# Patient Record
Sex: MALE | Race: BLACK OR AFRICAN AMERICAN | Employment: OTHER | URBAN - METROPOLITAN AREA
[De-identification: names, ages, dates, MRNs, and addresses within clinical notes are randomized per-mention and may not be internally consistent; named-entity substitution may affect disease eponyms.]

---

## 2018-06-06 ENCOUNTER — HOSPITAL ENCOUNTER (OUTPATIENT)
Dept: PREADMISSION TESTING | Age: 48
Discharge: HOME | End: 2018-06-10
Payer: COMMERCIAL

## 2018-06-06 VITALS
TEMPERATURE: 98.2 F | BODY MASS INDEX: 31.28 KG/M2 | WEIGHT: 236 LBS | OXYGEN SATURATION: 100 % | DIASTOLIC BLOOD PRESSURE: 84 MMHG | HEIGHT: 73 IN | RESPIRATION RATE: 24 BRPM | SYSTOLIC BLOOD PRESSURE: 129 MMHG | HEART RATE: 94 BPM

## 2018-06-06 LAB
ABSOLUTE EOS #: 0.18 K/UL (ref 0–0.44)
ABSOLUTE IMMATURE GRANULOCYTE: <0.03 K/UL (ref 0–0.3)
ABSOLUTE LYMPH #: 2.3 K/UL (ref 1.1–3.7)
ABSOLUTE MONO #: 0.34 K/UL (ref 0.1–1.2)
ANION GAP SERPL CALCULATED.3IONS-SCNC: 12 MMOL/L (ref 9–17)
BASOPHILS # BLD: 1 % (ref 0–2)
BASOPHILS ABSOLUTE: 0.05 K/UL (ref 0–0.2)
BUN BLDV-MCNC: 11 MG/DL (ref 6–20)
CHLORIDE BLD-SCNC: 101 MMOL/L (ref 98–107)
CO2: 29 MMOL/L (ref 20–31)
CREAT SERPL-MCNC: 0.61 MG/DL (ref 0.7–1.2)
DIFFERENTIAL TYPE: ABNORMAL
EKG ATRIAL RATE: 83 BPM
EKG P AXIS: 42 DEGREES
EKG P-R INTERVAL: 152 MS
EKG Q-T INTERVAL: 380 MS
EKG QRS DURATION: 86 MS
EKG QTC CALCULATION (BAZETT): 446 MS
EKG R AXIS: 4 DEGREES
EKG T AXIS: 5 DEGREES
EKG VENTRICULAR RATE: 83 BPM
EOSINOPHILS RELATIVE PERCENT: 4 % (ref 1–4)
GFR AFRICAN AMERICAN: >60 ML/MIN
GFR NON-AFRICAN AMERICAN: >60 ML/MIN
GFR SERPL CREATININE-BSD FRML MDRD: ABNORMAL ML/MIN/{1.73_M2}
GFR SERPL CREATININE-BSD FRML MDRD: ABNORMAL ML/MIN/{1.73_M2}
GLUCOSE BLD-MCNC: 112 MG/DL (ref 70–99)
HCT VFR BLD CALC: 43.2 % (ref 40.7–50.3)
HEMOGLOBIN: 14 G/DL (ref 13–17)
IMMATURE GRANULOCYTES: 0 %
LYMPHOCYTES # BLD: 46 % (ref 24–43)
MCH RBC QN AUTO: 29.3 PG (ref 25.2–33.5)
MCHC RBC AUTO-ENTMCNC: 32.4 G/DL (ref 28.4–34.8)
MCV RBC AUTO: 90.4 FL (ref 82.6–102.9)
MONOCYTES # BLD: 7 % (ref 3–12)
NRBC AUTOMATED: 0 PER 100 WBC
PDW BLD-RTO: 12.1 % (ref 11.8–14.4)
PLATELET # BLD: 228 K/UL (ref 138–453)
PLATELET ESTIMATE: ABNORMAL
PMV BLD AUTO: 10.2 FL (ref 8.1–13.5)
POTASSIUM SERPL-SCNC: 4.7 MMOL/L (ref 3.7–5.3)
RBC # BLD: 4.78 M/UL (ref 4.21–5.77)
RBC # BLD: ABNORMAL 10*6/UL
SEG NEUTROPHILS: 42 % (ref 36–65)
SEGMENTED NEUTROPHILS ABSOLUTE COUNT: 2.08 K/UL (ref 1.5–8.1)
SODIUM BLD-SCNC: 142 MMOL/L (ref 135–144)
WBC # BLD: 5 K/UL (ref 3.5–11.3)
WBC # BLD: ABNORMAL 10*3/UL

## 2018-06-06 PROCEDURE — 93005 ELECTROCARDIOGRAM TRACING: CPT

## 2018-06-06 PROCEDURE — 82947 ASSAY GLUCOSE BLOOD QUANT: CPT

## 2018-06-06 PROCEDURE — 82565 ASSAY OF CREATININE: CPT

## 2018-06-06 PROCEDURE — 84520 ASSAY OF UREA NITROGEN: CPT

## 2018-06-06 PROCEDURE — 85025 COMPLETE CBC W/AUTO DIFF WBC: CPT

## 2018-06-06 PROCEDURE — 80051 ELECTROLYTE PANEL: CPT

## 2018-06-06 PROCEDURE — 36415 COLL VENOUS BLD VENIPUNCTURE: CPT

## 2018-06-06 RX ORDER — INSULIN GLARGINE 100 [IU]/ML
30 INJECTION, SOLUTION SUBCUTANEOUS 2 TIMES DAILY
COMMUNITY
End: 2019-04-12

## 2018-06-06 RX ORDER — OXYCODONE HYDROCHLORIDE AND ACETAMINOPHEN 325; 5 MG/5ML; MG/5ML
SOLUTION ORAL EVERY 4 HOURS PRN
COMMUNITY
End: 2019-05-20 | Stop reason: ALTCHOICE

## 2018-06-06 RX ORDER — GABAPENTIN 600 MG/1
600 TABLET ORAL 3 TIMES DAILY
COMMUNITY
End: 2018-11-21 | Stop reason: SDUPTHER

## 2018-06-06 RX ORDER — SODIUM CHLORIDE, SODIUM LACTATE, POTASSIUM CHLORIDE, CALCIUM CHLORIDE 600; 310; 30; 20 MG/100ML; MG/100ML; MG/100ML; MG/100ML
1000 INJECTION, SOLUTION INTRAVENOUS CONTINUOUS
Status: CANCELLED | OUTPATIENT
Start: 2018-06-06

## 2018-06-14 ENCOUNTER — HOSPITAL ENCOUNTER (OUTPATIENT)
Age: 48
Setting detail: OUTPATIENT SURGERY
Discharge: HOME | End: 2018-06-14
Attending: NEUROLOGICAL SURGERY | Admitting: NEUROLOGICAL SURGERY
Payer: MEDICARE

## 2018-06-14 ENCOUNTER — ANESTHESIA (OUTPATIENT)
Dept: OPERATING ROOM | Age: 48
End: 2018-06-14
Payer: MEDICARE

## 2018-06-14 ENCOUNTER — APPOINTMENT (OUTPATIENT)
Dept: GENERAL RADIOLOGY | Age: 48
End: 2018-06-14
Attending: NEUROLOGICAL SURGERY
Payer: MEDICARE

## 2018-06-14 ENCOUNTER — ANESTHESIA EVENT (OUTPATIENT)
Dept: OPERATING ROOM | Age: 48
End: 2018-06-14
Payer: MEDICARE

## 2018-06-14 VITALS
DIASTOLIC BLOOD PRESSURE: 95 MMHG | BODY MASS INDEX: 31.14 KG/M2 | HEART RATE: 102 BPM | OXYGEN SATURATION: 100 % | HEIGHT: 73 IN | RESPIRATION RATE: 18 BRPM | WEIGHT: 235 LBS | SYSTOLIC BLOOD PRESSURE: 165 MMHG | TEMPERATURE: 97.1 F

## 2018-06-14 LAB — GLUCOSE BLD-MCNC: 177 MG/DL (ref 75–110)

## 2018-06-14 PROCEDURE — 2580000003 HC RX 258: Performed by: ANESTHESIOLOGY

## 2018-06-14 PROCEDURE — 82947 ASSAY GLUCOSE BLOOD QUANT: CPT

## 2018-06-14 RX ORDER — ONDANSETRON 2 MG/ML
4 INJECTION INTRAMUSCULAR; INTRAVENOUS
Status: CANCELLED | OUTPATIENT
Start: 2018-06-14 | End: 2018-06-14

## 2018-06-14 RX ORDER — FENTANYL CITRATE 50 UG/ML
25 INJECTION, SOLUTION INTRAMUSCULAR; INTRAVENOUS EVERY 5 MIN PRN
Status: CANCELLED | OUTPATIENT
Start: 2018-06-14

## 2018-06-14 RX ORDER — FENTANYL CITRATE 50 UG/ML
50 INJECTION, SOLUTION INTRAMUSCULAR; INTRAVENOUS EVERY 5 MIN PRN
Status: CANCELLED | OUTPATIENT
Start: 2018-06-14

## 2018-06-14 RX ORDER — DIPHENHYDRAMINE HYDROCHLORIDE 50 MG/ML
12.5 INJECTION INTRAMUSCULAR; INTRAVENOUS
Status: CANCELLED | OUTPATIENT
Start: 2018-06-14 | End: 2018-06-14

## 2018-06-14 RX ORDER — LABETALOL HYDROCHLORIDE 5 MG/ML
5 INJECTION, SOLUTION INTRAVENOUS EVERY 10 MIN PRN
Status: CANCELLED | OUTPATIENT
Start: 2018-06-14

## 2018-06-14 RX ORDER — SODIUM CHLORIDE, SODIUM LACTATE, POTASSIUM CHLORIDE, CALCIUM CHLORIDE 600; 310; 30; 20 MG/100ML; MG/100ML; MG/100ML; MG/100ML
1000 INJECTION, SOLUTION INTRAVENOUS CONTINUOUS
Status: DISCONTINUED | OUTPATIENT
Start: 2018-06-14 | End: 2018-06-29 | Stop reason: HOSPADM

## 2018-06-14 RX ORDER — HYDRALAZINE HYDROCHLORIDE 20 MG/ML
5 INJECTION INTRAMUSCULAR; INTRAVENOUS EVERY 10 MIN PRN
Status: CANCELLED | OUTPATIENT
Start: 2018-06-14

## 2018-06-14 RX ORDER — OXYCODONE HYDROCHLORIDE AND ACETAMINOPHEN 5; 325 MG/1; MG/1
1 TABLET ORAL EVERY 4 HOURS PRN
Status: CANCELLED | OUTPATIENT
Start: 2018-06-14

## 2018-06-14 RX ADMIN — SODIUM CHLORIDE, POTASSIUM CHLORIDE, SODIUM LACTATE AND CALCIUM CHLORIDE 1000 ML: 600; 310; 30; 20 INJECTION, SOLUTION INTRAVENOUS at 10:35

## 2018-06-14 ASSESSMENT — PAIN - FUNCTIONAL ASSESSMENT: PAIN_FUNCTIONAL_ASSESSMENT: 0-10

## 2018-08-10 ENCOUNTER — OFFICE VISIT (OUTPATIENT)
Dept: FAMILY MEDICINE CLINIC | Age: 48
End: 2018-08-10
Payer: MEDICARE

## 2018-08-10 VITALS
BODY MASS INDEX: 31.81 KG/M2 | HEART RATE: 89 BPM | SYSTOLIC BLOOD PRESSURE: 132 MMHG | TEMPERATURE: 97.3 F | DIASTOLIC BLOOD PRESSURE: 82 MMHG | HEIGHT: 73 IN | WEIGHT: 240 LBS

## 2018-08-10 DIAGNOSIS — I10 ESSENTIAL HYPERTENSION: ICD-10-CM

## 2018-08-10 DIAGNOSIS — Z79.4 TYPE 2 DIABETES MELLITUS WITH COMPLICATION, WITH LONG-TERM CURRENT USE OF INSULIN (HCC): Primary | ICD-10-CM

## 2018-08-10 DIAGNOSIS — E11.8 TYPE 2 DIABETES MELLITUS WITH COMPLICATION, WITH LONG-TERM CURRENT USE OF INSULIN (HCC): Primary | ICD-10-CM

## 2018-08-10 DIAGNOSIS — Z00.00 ROUTINE HEALTH MAINTENANCE: ICD-10-CM

## 2018-08-10 PROBLEM — E11.9 TYPE 2 DIABETES MELLITUS, WITH LONG-TERM CURRENT USE OF INSULIN (HCC): Status: ACTIVE | Noted: 2018-08-10

## 2018-08-10 LAB — HBA1C MFR BLD: 11.8 %

## 2018-08-10 PROCEDURE — G8427 DOCREV CUR MEDS BY ELIG CLIN: HCPCS | Performed by: STUDENT IN AN ORGANIZED HEALTH CARE EDUCATION/TRAINING PROGRAM

## 2018-08-10 PROCEDURE — 3046F HEMOGLOBIN A1C LEVEL >9.0%: CPT | Performed by: STUDENT IN AN ORGANIZED HEALTH CARE EDUCATION/TRAINING PROGRAM

## 2018-08-10 PROCEDURE — G8417 CALC BMI ABV UP PARAM F/U: HCPCS | Performed by: STUDENT IN AN ORGANIZED HEALTH CARE EDUCATION/TRAINING PROGRAM

## 2018-08-10 PROCEDURE — 1036F TOBACCO NON-USER: CPT | Performed by: STUDENT IN AN ORGANIZED HEALTH CARE EDUCATION/TRAINING PROGRAM

## 2018-08-10 PROCEDURE — 90715 TDAP VACCINE 7 YRS/> IM: CPT

## 2018-08-10 PROCEDURE — 3017F COLORECTAL CA SCREEN DOC REV: CPT | Performed by: STUDENT IN AN ORGANIZED HEALTH CARE EDUCATION/TRAINING PROGRAM

## 2018-08-10 PROCEDURE — G0008 ADMIN INFLUENZA VIRUS VAC: HCPCS | Performed by: FAMILY MEDICINE

## 2018-08-10 PROCEDURE — 2022F DILAT RTA XM EVC RTNOPTHY: CPT | Performed by: STUDENT IN AN ORGANIZED HEALTH CARE EDUCATION/TRAINING PROGRAM

## 2018-08-10 PROCEDURE — 99214 OFFICE O/P EST MOD 30 MIN: CPT | Performed by: STUDENT IN AN ORGANIZED HEALTH CARE EDUCATION/TRAINING PROGRAM

## 2018-08-10 PROCEDURE — 83037 HB GLYCOSYLATED A1C HOME DEV: CPT | Performed by: STUDENT IN AN ORGANIZED HEALTH CARE EDUCATION/TRAINING PROGRAM

## 2018-08-10 PROCEDURE — 99203 OFFICE O/P NEW LOW 30 MIN: CPT | Performed by: STUDENT IN AN ORGANIZED HEALTH CARE EDUCATION/TRAINING PROGRAM

## 2018-08-10 RX ORDER — LISINOPRIL 2.5 MG/1
2.5 TABLET ORAL 2 TIMES DAILY
COMMUNITY
Start: 2018-07-17 | End: 2018-08-10

## 2018-08-10 RX ORDER — GLUCOSAMINE HCL/CHONDROITIN SU 500-400 MG
CAPSULE ORAL
Qty: 100 STRIP | Refills: 0 | Status: SHIPPED | OUTPATIENT
Start: 2018-08-10 | End: 2018-09-25 | Stop reason: SDUPTHER

## 2018-08-10 RX ORDER — METOPROLOL SUCCINATE 25 MG/1
25 TABLET, EXTENDED RELEASE ORAL DAILY
COMMUNITY
Start: 2018-07-17 | End: 2018-08-10

## 2018-08-10 RX ORDER — LISINOPRIL 2.5 MG/1
2.5 TABLET ORAL 2 TIMES DAILY
Qty: 30 TABLET | Refills: 3 | Status: SHIPPED | OUTPATIENT
Start: 2018-08-10 | End: 2018-10-08 | Stop reason: SDUPTHER

## 2018-08-10 RX ORDER — BLOOD-GLUCOSE METER
1 KIT MISCELLANEOUS
Qty: 1 KIT | Refills: 0 | Status: SHIPPED | OUTPATIENT
Start: 2018-08-10

## 2018-08-10 RX ORDER — METOPROLOL SUCCINATE 25 MG/1
25 TABLET, EXTENDED RELEASE ORAL DAILY
Qty: 30 TABLET | Refills: 3 | Status: SHIPPED | OUTPATIENT
Start: 2018-08-10 | End: 2018-12-07 | Stop reason: SDUPTHER

## 2018-08-10 ASSESSMENT — ENCOUNTER SYMPTOMS
CHEST TIGHTNESS: 1
BLOOD IN STOOL: 0
COUGH: 0
SHORTNESS OF BREATH: 0
WHEEZING: 0
ABDOMINAL PAIN: 0

## 2018-08-10 NOTE — PROGRESS NOTES
Attending Physician Statement  I have discussed the case, including pertinent history and exam findings with the resident. I have seen and examined the patient and the key elements of the encounter have been performed by me. I agree with the assessment, plan and orders as documented by the resident.         /82 (Site: Right Arm, Position: Sitting, Cuff Size: Medium Adult) Comment: machine  Pulse 89   Temp 97.3 °F (36.3 °C) (Oral)   Ht 6' 1\" (1.854 m)   Wt 240 lb (108.9 kg)   BMI 31.66 kg/m²    BP Readings from Last 3 Encounters:   08/10/18 132/82   06/14/18 (!) 165/95   06/06/18 129/84     Wt Readings from Last 3 Encounters:   08/10/18 240 lb (108.9 kg)   06/14/18 235 lb (106.6 kg)   06/06/18 236 lb (107 kg)       Kareen Leventhal, DO 8/10/2018 4:02 PM
Diabetes (patient states that he is here for DM)          /82 (Site: Right Arm, Position: Sitting, Cuff Size: Medium Adult) Comment: machine  Pulse 89   Temp 97.3 °F (36.3 °C) (Oral)   Ht 6' 1\" (1.854 m)   Wt 240 lb (108.9 kg)   BMI 31.66 kg/m²       ROS      Physical Exam      ASSESSMENT AND PLAN       1. Type 2 diabetes mellitus with complication, with long-term current use of insulin (HCC)  ***  - POCT glycosylated hemoglobin, home device (HbA1C)  - Lipid Panel; Future  - Basic Metabolic Panel; Future  - [address] [place] [address]  - glucose monitoring kit (FREESTYLE) monitoring kit; 1 kit by Does not apply route 3 times daily (before meals)  Dispense: 1 kit; Refill: 0  - blood glucose monitor strips; by Other route 3 times daily (with meals)  Dispense: 100 strip; Refill: 0  - insulin aspart (NOVOLOG FLEXPEN) 100 UNIT/ML injection pen; ISS, 10-15 units/day  Dispense: 5 pen; Refill: 3    2. Essential hypertension  ***  - Lipid Panel; Future  - lisinopril (ZESTRIL) 2.5 MG tablet; Take 1 tablet by mouth 2 times daily  Dispense: 30 tablet; Refill: 3  - metoprolol succinate (TOPROL XL) 25 MG extended release tablet; Take 1 tablet by mouth daily  Dispense: 30 tablet; Refill: 3    3. Routine health maintenance  ***  - Varicella-zoster vaccine subcutaneous (ZOSTAVAX)  - POCT Fecal Immunochemical Test (FIT); Future  - HIV Screen;  Future          {Clinical Staff KENIA:[id number]}      Electronically signed by Karen De La Cruz DO on 8/10/2018 at 3:56 PM
Provider, MD   insulin aspart (NOVOLOG) 100 UNIT/ML injection vial Inject into the skin 3 times daily (before meals) SLIDING SCALE Yes Historical Provider, MD   insulin glargine (LANTUS) 100 UNIT/ML injection vial Inject 30 Units into the skin 2 times daily Yes Historical Provider, MD        No Known Allergies    Past Medical History:   Diagnosis Date    Cervical disc disease     Diabetes mellitus ([address] 1993    IDDM       Past Surgical History:   Procedure Laterality Date    CYST REMOVAL  2008    POSTERIOR HEAD       Social History     Social History    Marital status:      Spouse name: N/A    Number of children: N/A    Years of education: N/A     Occupational History    Not on file. Social History Main Topics    Smoking status: Former Smoker     Types: Cigars     Quit date: 6/6/1995    Smokeless tobacco: Never Used    Alcohol use No    Drug use: No    Sexual activity: Not on file     Other Topics Concern    Not on file     Social History Narrative    No narrative on file        Family History   Problem Relation Age of Onset    Diabetes Mother     Diabetes Father     Hypertension Father     Stroke Sister     Other Sister         HIP REPLACEMENT       Vitals:    08/10/18 1345   BP: 132/82  Comment: machine   Site: Right Arm   Position: Sitting   Cuff Size: Medium Adult   Pulse: 89   Temp: 97.3 °F (36.3 °C)   TempSrc: Oral   Weight: 240 lb (108.9 kg)   Height: 6' 1\" (1.854 m)     Estimated body mass index is 31.66 kg/m² as calculated from the following:    Height as of this encounter: 6' 1\" (1.854 m). Weight as of this encounter: 240 lb (108.9 kg). Physical Exam   Constitutional: He is oriented to person, place, and time. He appears well-developed and well-nourished. No distress. HENT:   Head: Normocephalic and atraumatic. Eyes:   +proptosis   Cardiovascular: Normal rate, regular rhythm, normal heart sounds and intact distal pulses. Exam reveals no gallop and no friction rub.

## 2018-08-14 ENCOUNTER — TELEPHONE (OUTPATIENT)
Dept: FAMILY MEDICINE CLINIC | Age: 48
End: 2018-08-14

## 2018-08-15 RX ORDER — PEN NEEDLE, DIABETIC 32GX 5/32"
NEEDLE, DISPOSABLE MISCELLANEOUS
Qty: 100 EACH | Refills: 0 | Status: SHIPPED | OUTPATIENT
Start: 2018-08-15 | End: 2018-11-21 | Stop reason: SDUPTHER

## 2018-08-24 RX ORDER — BLOOD-GLUCOSE METER
1 KIT MISCELLANEOUS DAILY
Qty: 1 KIT | Refills: 0 | Status: SHIPPED | OUTPATIENT
Start: 2018-08-24

## 2018-09-25 DIAGNOSIS — E11.8 TYPE 2 DIABETES MELLITUS WITH COMPLICATION, WITH LONG-TERM CURRENT USE OF INSULIN (HCC): ICD-10-CM

## 2018-09-25 DIAGNOSIS — Z79.4 TYPE 2 DIABETES MELLITUS WITH COMPLICATION, WITH LONG-TERM CURRENT USE OF INSULIN (HCC): ICD-10-CM

## 2018-09-25 RX ORDER — CALCIUM CITRATE/VITAMIN D3 200MG-6.25
TABLET ORAL
Qty: 100 STRIP | Refills: 0 | Status: SHIPPED | OUTPATIENT
Start: 2018-09-25 | End: 2018-12-12 | Stop reason: SDUPTHER

## 2018-10-08 DIAGNOSIS — I10 ESSENTIAL HYPERTENSION: ICD-10-CM

## 2018-10-08 RX ORDER — LISINOPRIL 2.5 MG/1
TABLET ORAL
Qty: 30 TABLET | Refills: 3 | Status: SHIPPED | OUTPATIENT
Start: 2018-10-08 | End: 2018-12-12 | Stop reason: SDUPTHER

## 2018-10-26 RX ORDER — INSULIN GLARGINE 100 [IU]/ML
INJECTION, SOLUTION SUBCUTANEOUS
Qty: 15 ML | Refills: 3 | Status: SHIPPED | OUTPATIENT
Start: 2018-10-26 | End: 2019-01-26 | Stop reason: SDUPTHER

## 2018-11-08 ENCOUNTER — HOSPITAL ENCOUNTER (OUTPATIENT)
Age: 48
Setting detail: SPECIMEN
Discharge: HOME | End: 2018-11-08
Payer: COMMERCIAL

## 2018-11-08 DIAGNOSIS — Z79.4 TYPE 2 DIABETES MELLITUS WITH COMPLICATION, WITH LONG-TERM CURRENT USE OF INSULIN (HCC): ICD-10-CM

## 2018-11-08 DIAGNOSIS — Z00.00 ROUTINE HEALTH MAINTENANCE: ICD-10-CM

## 2018-11-08 DIAGNOSIS — I10 ESSENTIAL HYPERTENSION: ICD-10-CM

## 2018-11-08 DIAGNOSIS — E11.8 TYPE 2 DIABETES MELLITUS WITH COMPLICATION, WITH LONG-TERM CURRENT USE OF INSULIN (HCC): ICD-10-CM

## 2018-11-08 LAB
ANION GAP SERPL CALCULATED.3IONS-SCNC: 15 MMOL/L (ref 9–17)
BUN BLDV-MCNC: 17 MG/DL (ref 6–20)
BUN/CREAT BLD: ABNORMAL (ref 9–20)
CALCIUM SERPL-MCNC: 9.5 MG/DL (ref 8.6–10.4)
CHLORIDE BLD-SCNC: 97 MMOL/L (ref 98–107)
CHOLESTEROL/HDL RATIO: 4.6
CHOLESTEROL: 173 MG/DL
CO2: 26 MMOL/L (ref 20–31)
CREAT SERPL-MCNC: 0.67 MG/DL (ref 0.7–1.2)
GFR AFRICAN AMERICAN: >60 ML/MIN
GFR NON-AFRICAN AMERICAN: >60 ML/MIN
GFR SERPL CREATININE-BSD FRML MDRD: ABNORMAL ML/MIN/{1.73_M2}
GFR SERPL CREATININE-BSD FRML MDRD: ABNORMAL ML/MIN/{1.73_M2}
GLUCOSE BLD-MCNC: 463 MG/DL (ref 70–99)
HDLC SERPL-MCNC: 38 MG/DL
HIV AG/AB: NONREACTIVE
LDL CHOLESTEROL: 101 MG/DL (ref 0–130)
POTASSIUM SERPL-SCNC: 4.9 MMOL/L (ref 3.7–5.3)
SODIUM BLD-SCNC: 138 MMOL/L (ref 135–144)
TRIGL SERPL-MCNC: 172 MG/DL
VLDLC SERPL CALC-MCNC: ABNORMAL MG/DL (ref 1–30)

## 2018-11-21 ENCOUNTER — OFFICE VISIT (OUTPATIENT)
Dept: FAMILY MEDICINE CLINIC | Age: 48
End: 2018-11-21
Payer: MEDICARE

## 2018-11-21 VITALS
SYSTOLIC BLOOD PRESSURE: 134 MMHG | DIASTOLIC BLOOD PRESSURE: 89 MMHG | HEIGHT: 73 IN | TEMPERATURE: 97.8 F | HEART RATE: 97 BPM | WEIGHT: 242 LBS | BODY MASS INDEX: 32.07 KG/M2

## 2018-11-21 DIAGNOSIS — G62.9 NEUROPATHY: ICD-10-CM

## 2018-11-21 DIAGNOSIS — Z00.00 HEALTHCARE MAINTENANCE: ICD-10-CM

## 2018-11-21 LAB — HBA1C MFR BLD: 12.5 %

## 2018-11-21 PROCEDURE — 99213 OFFICE O/P EST LOW 20 MIN: CPT | Performed by: STUDENT IN AN ORGANIZED HEALTH CARE EDUCATION/TRAINING PROGRAM

## 2018-11-21 PROCEDURE — 90688 IIV4 VACCINE SPLT 0.5 ML IM: CPT | Performed by: STUDENT IN AN ORGANIZED HEALTH CARE EDUCATION/TRAINING PROGRAM

## 2018-11-21 PROCEDURE — 1036F TOBACCO NON-USER: CPT | Performed by: STUDENT IN AN ORGANIZED HEALTH CARE EDUCATION/TRAINING PROGRAM

## 2018-11-21 PROCEDURE — 3046F HEMOGLOBIN A1C LEVEL >9.0%: CPT | Performed by: STUDENT IN AN ORGANIZED HEALTH CARE EDUCATION/TRAINING PROGRAM

## 2018-11-21 PROCEDURE — G8427 DOCREV CUR MEDS BY ELIG CLIN: HCPCS | Performed by: STUDENT IN AN ORGANIZED HEALTH CARE EDUCATION/TRAINING PROGRAM

## 2018-11-21 PROCEDURE — G8417 CALC BMI ABV UP PARAM F/U: HCPCS | Performed by: STUDENT IN AN ORGANIZED HEALTH CARE EDUCATION/TRAINING PROGRAM

## 2018-11-21 PROCEDURE — 3017F COLORECTAL CA SCREEN DOC REV: CPT | Performed by: STUDENT IN AN ORGANIZED HEALTH CARE EDUCATION/TRAINING PROGRAM

## 2018-11-21 PROCEDURE — 83036 HEMOGLOBIN GLYCOSYLATED A1C: CPT | Performed by: STUDENT IN AN ORGANIZED HEALTH CARE EDUCATION/TRAINING PROGRAM

## 2018-11-21 PROCEDURE — 2022F DILAT RTA XM EVC RTNOPTHY: CPT | Performed by: STUDENT IN AN ORGANIZED HEALTH CARE EDUCATION/TRAINING PROGRAM

## 2018-11-21 PROCEDURE — G8482 FLU IMMUNIZE ORDER/ADMIN: HCPCS | Performed by: STUDENT IN AN ORGANIZED HEALTH CARE EDUCATION/TRAINING PROGRAM

## 2018-11-21 RX ORDER — GABAPENTIN 600 MG/1
600 TABLET ORAL 3 TIMES DAILY
Qty: 90 TABLET | Refills: 1 | Status: SHIPPED | OUTPATIENT
Start: 2018-11-21 | End: 2019-01-18 | Stop reason: SDUPTHER

## 2018-11-21 ASSESSMENT — ENCOUNTER SYMPTOMS
ABDOMINAL PAIN: 0
COUGH: 0
WHEEZING: 0
ABDOMINAL DISTENTION: 0
DIARRHEA: 0
SHORTNESS OF BREATH: 0
CHEST TIGHTNESS: 0
BACK PAIN: 1
NAUSEA: 0
VOMITING: 0

## 2018-11-21 ASSESSMENT — PATIENT HEALTH QUESTIONNAIRE - PHQ9
2. FEELING DOWN, DEPRESSED OR HOPELESS: 0
SUM OF ALL RESPONSES TO PHQ QUESTIONS 1-9: 0
SUM OF ALL RESPONSES TO PHQ QUESTIONS 1-9: 0
1. LITTLE INTEREST OR PLEASURE IN DOING THINGS: 0
SUM OF ALL RESPONSES TO PHQ9 QUESTIONS 1 & 2: 0

## 2018-11-21 NOTE — PROGRESS NOTES
person, place, and time. No cranial nerve deficit. Coordination normal.   Skin: He is not diaphoretic. Psychiatric: He has a normal mood and affect. His behavior is normal. Judgment normal.   Vitals reviewed. Assessment:       Diagnosis Orders   1. Diabetes mellitus type 2, uncontrolled, with complications (HCC)  gabapentin (NEURONTIN) 600 MG tablet    insulin aspart (NOVOLOG) 100 UNIT/ML injection vial    Insulin Pen Needle (BD PEN NEEDLE EM U/F) 32G X 4 MM MISC    VL ARTERIAL PVR LOWER WO EXERCISE   2. Neuropathy  gabapentin (NEURONTIN) 600 MG tablet   3. Healthcare maintenance  POCT glycosylated hemoglobin (Hb A1C)    INFLUENZA, QUADV, 3 YRS AND OLDER, IM, MDV, 0.5ML (FLUZONE QUADV)         Plan:      - f/u in approx.  6 weeks, early January  - Lantus increased by 5U, to Lantus 40U BID  - discussed plan for conservative management - elimination of high index foods like chocolate milk and patient will bring journal of glucose from that week  - sent for arterial scan for possible PAD causing foot problems/claudication  - patient to attend diabetes education  - refilled insulin, equipment   - Flu shot given  - at next visit, foot exam, refer for eyes         Lul Mesa MD

## 2018-11-23 ENCOUNTER — TELEPHONE (OUTPATIENT)
Dept: FAMILY MEDICINE CLINIC | Age: 48
End: 2018-11-23

## 2018-11-23 NOTE — TELEPHONE ENCOUNTER
Called pt to see how his appt went on Wednesday, pt stated it was excellent, had no issues, complaints, questions, etc.

## 2018-12-04 ENCOUNTER — HOSPITAL ENCOUNTER (OUTPATIENT)
Dept: VASCULAR LAB | Age: 48
Discharge: HOME | End: 2018-12-04
Payer: MEDICARE

## 2018-12-04 PROCEDURE — 93923 UPR/LXTR ART STDY 3+ LVLS: CPT

## 2018-12-07 DIAGNOSIS — I10 ESSENTIAL HYPERTENSION: ICD-10-CM

## 2018-12-12 DIAGNOSIS — E11.8 TYPE 2 DIABETES MELLITUS WITH COMPLICATION, WITH LONG-TERM CURRENT USE OF INSULIN (HCC): ICD-10-CM

## 2018-12-12 DIAGNOSIS — Z79.4 TYPE 2 DIABETES MELLITUS WITH COMPLICATION, WITH LONG-TERM CURRENT USE OF INSULIN (HCC): ICD-10-CM

## 2018-12-12 DIAGNOSIS — I10 ESSENTIAL HYPERTENSION: ICD-10-CM

## 2018-12-13 RX ORDER — CALCIUM CITRATE/VITAMIN D3 200MG-6.25
TABLET ORAL
Qty: 100 STRIP | Refills: 0 | Status: SHIPPED | OUTPATIENT
Start: 2018-12-13 | End: 2019-04-03 | Stop reason: SDUPTHER

## 2018-12-13 RX ORDER — LISINOPRIL 2.5 MG/1
TABLET ORAL
Qty: 30 TABLET | Refills: 3 | Status: SHIPPED | OUTPATIENT
Start: 2018-12-13 | End: 2019-01-29

## 2018-12-13 RX ORDER — METOPROLOL SUCCINATE 25 MG/1
TABLET, EXTENDED RELEASE ORAL
Qty: 30 TABLET | Refills: 3 | Status: SHIPPED | OUTPATIENT
Start: 2018-12-13 | End: 2019-04-09 | Stop reason: SDUPTHER

## 2018-12-14 DIAGNOSIS — Z79.4 TYPE 2 DIABETES MELLITUS WITH COMPLICATION, WITH LONG-TERM CURRENT USE OF INSULIN (HCC): ICD-10-CM

## 2018-12-14 DIAGNOSIS — E11.8 TYPE 2 DIABETES MELLITUS WITH COMPLICATION, WITH LONG-TERM CURRENT USE OF INSULIN (HCC): ICD-10-CM

## 2018-12-17 ENCOUNTER — TELEPHONE (OUTPATIENT)
Dept: FAMILY MEDICINE CLINIC | Age: 48
End: 2018-12-17

## 2019-01-18 DIAGNOSIS — G62.9 NEUROPATHY: ICD-10-CM

## 2019-01-18 RX ORDER — GABAPENTIN 600 MG/1
TABLET ORAL
Qty: 90 TABLET | Refills: 1 | Status: SHIPPED | OUTPATIENT
Start: 2019-01-18 | End: 2019-03-25 | Stop reason: SDUPTHER

## 2019-01-28 RX ORDER — INSULIN GLARGINE 100 [IU]/ML
INJECTION, SOLUTION SUBCUTANEOUS
Qty: 15 ML | Refills: 3 | Status: SHIPPED | OUTPATIENT
Start: 2019-01-28 | End: 2019-02-28 | Stop reason: SDUPTHER

## 2019-01-29 ENCOUNTER — HOSPITAL ENCOUNTER (OUTPATIENT)
Age: 49
Setting detail: SPECIMEN
Discharge: HOME | End: 2019-01-29
Payer: COMMERCIAL

## 2019-01-29 ENCOUNTER — OFFICE VISIT (OUTPATIENT)
Dept: FAMILY MEDICINE CLINIC | Age: 49
End: 2019-01-29
Payer: MEDICARE

## 2019-01-29 VITALS
BODY MASS INDEX: 32.87 KG/M2 | DIASTOLIC BLOOD PRESSURE: 100 MMHG | HEART RATE: 105 BPM | HEIGHT: 73 IN | TEMPERATURE: 98.2 F | WEIGHT: 248 LBS | SYSTOLIC BLOOD PRESSURE: 148 MMHG

## 2019-01-29 DIAGNOSIS — E11.40 TYPE 2 DIABETES MELLITUS WITH DIABETIC NEUROPATHY, WITH LONG-TERM CURRENT USE OF INSULIN (HCC): ICD-10-CM

## 2019-01-29 DIAGNOSIS — I10 ESSENTIAL HYPERTENSION: ICD-10-CM

## 2019-01-29 DIAGNOSIS — Z79.4 TYPE 2 DIABETES MELLITUS WITH DIABETIC NEUROPATHY, WITH LONG-TERM CURRENT USE OF INSULIN (HCC): Primary | ICD-10-CM

## 2019-01-29 DIAGNOSIS — M54.2 NECK PAIN: ICD-10-CM

## 2019-01-29 DIAGNOSIS — Z79.4 TYPE 2 DIABETES MELLITUS WITH DIABETIC NEUROPATHY, WITH LONG-TERM CURRENT USE OF INSULIN (HCC): ICD-10-CM

## 2019-01-29 DIAGNOSIS — E11.40 TYPE 2 DIABETES MELLITUS WITH DIABETIC NEUROPATHY, WITH LONG-TERM CURRENT USE OF INSULIN (HCC): Primary | ICD-10-CM

## 2019-01-29 DIAGNOSIS — Z12.11 SCREENING FOR COLON CANCER: ICD-10-CM

## 2019-01-29 LAB
CREATININE URINE: 207.9 MG/DL (ref 39–259)
CREATININE URINE: 213 MG/DL (ref 39–259)
ESTIMATED AVERAGE GLUCOSE: 286 MG/DL
HBA1C MFR BLD: 11.6 % (ref 4–6)
MICROALBUMIN/CREAT 24H UR: 63 MG/L
MICROALBUMIN/CREAT UR-RTO: 30 MCG/MG CREAT

## 2019-01-29 PROCEDURE — G8482 FLU IMMUNIZE ORDER/ADMIN: HCPCS | Performed by: STUDENT IN AN ORGANIZED HEALTH CARE EDUCATION/TRAINING PROGRAM

## 2019-01-29 PROCEDURE — 99213 OFFICE O/P EST LOW 20 MIN: CPT | Performed by: STUDENT IN AN ORGANIZED HEALTH CARE EDUCATION/TRAINING PROGRAM

## 2019-01-29 PROCEDURE — G8417 CALC BMI ABV UP PARAM F/U: HCPCS | Performed by: STUDENT IN AN ORGANIZED HEALTH CARE EDUCATION/TRAINING PROGRAM

## 2019-01-29 PROCEDURE — 1036F TOBACCO NON-USER: CPT | Performed by: STUDENT IN AN ORGANIZED HEALTH CARE EDUCATION/TRAINING PROGRAM

## 2019-01-29 PROCEDURE — 3017F COLORECTAL CA SCREEN DOC REV: CPT | Performed by: STUDENT IN AN ORGANIZED HEALTH CARE EDUCATION/TRAINING PROGRAM

## 2019-01-29 PROCEDURE — 2022F DILAT RTA XM EVC RTNOPTHY: CPT | Performed by: STUDENT IN AN ORGANIZED HEALTH CARE EDUCATION/TRAINING PROGRAM

## 2019-01-29 PROCEDURE — 3046F HEMOGLOBIN A1C LEVEL >9.0%: CPT | Performed by: STUDENT IN AN ORGANIZED HEALTH CARE EDUCATION/TRAINING PROGRAM

## 2019-01-29 PROCEDURE — G8427 DOCREV CUR MEDS BY ELIG CLIN: HCPCS | Performed by: STUDENT IN AN ORGANIZED HEALTH CARE EDUCATION/TRAINING PROGRAM

## 2019-01-29 RX ORDER — LISINOPRIL 10 MG/1
10 TABLET ORAL DAILY
Qty: 30 TABLET | Refills: 1 | Status: SHIPPED | OUTPATIENT
Start: 2019-01-29 | End: 2019-04-12

## 2019-01-29 RX ORDER — ATORVASTATIN CALCIUM 40 MG/1
40 TABLET, FILM COATED ORAL NIGHTLY
Qty: 90 TABLET | Refills: 0 | Status: SHIPPED | OUTPATIENT
Start: 2019-01-29 | End: 2019-05-20 | Stop reason: SDUPTHER

## 2019-01-29 ASSESSMENT — ENCOUNTER SYMPTOMS
SHORTNESS OF BREATH: 0
ABDOMINAL PAIN: 0
NAUSEA: 0
CHEST TIGHTNESS: 0
VOMITING: 0

## 2019-02-09 DIAGNOSIS — Z79.4 TYPE 2 DIABETES MELLITUS WITH COMPLICATION, WITH LONG-TERM CURRENT USE OF INSULIN (HCC): ICD-10-CM

## 2019-02-09 DIAGNOSIS — E11.8 TYPE 2 DIABETES MELLITUS WITH COMPLICATION, WITH LONG-TERM CURRENT USE OF INSULIN (HCC): ICD-10-CM

## 2019-02-17 RX ORDER — INSULIN ASPART 100 [IU]/ML
INJECTION, SOLUTION INTRAVENOUS; SUBCUTANEOUS
Qty: 3 ML | Refills: 1 | Status: SHIPPED | OUTPATIENT
Start: 2019-02-17 | End: 2019-05-27 | Stop reason: SDUPTHER

## 2019-02-17 RX ORDER — INSULIN ASPART 100 [IU]/ML
INJECTION, SOLUTION INTRAVENOUS; SUBCUTANEOUS
Qty: 15 ML | Refills: 0 | Status: SHIPPED | OUTPATIENT
Start: 2019-02-17 | End: 2022-09-15

## 2019-02-21 DIAGNOSIS — I10 ESSENTIAL HYPERTENSION: ICD-10-CM

## 2019-02-21 RX ORDER — LISINOPRIL 2.5 MG/1
TABLET ORAL
Qty: 30 TABLET | Refills: 3 | Status: SHIPPED | OUTPATIENT
Start: 2019-02-21 | End: 2019-04-12 | Stop reason: SDUPTHER

## 2019-03-07 RX ORDER — INSULIN GLARGINE 100 [IU]/ML
INJECTION, SOLUTION SUBCUTANEOUS
Qty: 24 ML | Refills: 3 | Status: SHIPPED | OUTPATIENT
Start: 2019-03-07 | End: 2019-04-12 | Stop reason: SDUPTHER

## 2019-03-25 DIAGNOSIS — G62.9 NEUROPATHY: ICD-10-CM

## 2019-03-27 RX ORDER — GABAPENTIN 600 MG/1
TABLET ORAL
Qty: 90 TABLET | Refills: 1 | Status: SHIPPED | OUTPATIENT
Start: 2019-03-27 | End: 2019-05-15 | Stop reason: SDUPTHER

## 2019-04-03 DIAGNOSIS — E11.8 TYPE 2 DIABETES MELLITUS WITH COMPLICATION, WITH LONG-TERM CURRENT USE OF INSULIN (HCC): ICD-10-CM

## 2019-04-03 DIAGNOSIS — Z79.4 TYPE 2 DIABETES MELLITUS WITH COMPLICATION, WITH LONG-TERM CURRENT USE OF INSULIN (HCC): ICD-10-CM

## 2019-04-03 RX ORDER — CALCIUM CITRATE/VITAMIN D3 200MG-6.25
TABLET ORAL
Qty: 100 STRIP | Refills: 0 | Status: SHIPPED | OUTPATIENT
Start: 2019-04-03 | End: 2019-05-27 | Stop reason: SDUPTHER

## 2019-04-03 NOTE — TELEPHONE ENCOUNTER
Last visit:   Last Med refill: 12-13-18  Does patient have enough medication for 72 hours: No:     Next Visit Date:  No future appointments. Health Maintenance   Topic Date Due    Pneumococcal 0-64 years at Risk Vaccine (1 of 1 - PPSV23) 01/13/1974    Diabetic retinal exam  01/13/1978    Hepatitis B Vaccine (1 of 3 - Risk 3-dose series) 01/13/1987    Shingles Vaccine (1 of 2) 01/13/2018    Colon cancer screen colonoscopy  01/13/2018    A1C test (Diabetic or Prediabetic)  04/29/2019    Lipid screen  11/08/2019    Potassium monitoring  11/08/2019    Creatinine monitoring  11/08/2019    Diabetic foot exam  01/29/2020    Diabetic microalbuminuria test  01/29/2020    DTaP/Tdap/Td vaccine (2 - Td) 08/10/2028    Flu vaccine  Completed    HIV screen  Completed       Hemoglobin A1C (%)   Date Value   01/29/2019 11.6 (H)   11/21/2018 12.5   08/10/2018 11.8             ( goal A1C is < 7)   Microalb/Crt.  Ratio (mcg/mg creat)   Date Value   01/29/2019 30 (H)     LDL Cholesterol (mg/dL)   Date Value   11/08/2018 101       (goal LDL is <100)   BUN (mg/dL)   Date Value   11/08/2018 17     BP Readings from Last 3 Encounters:   01/29/19 (!) 148/100   11/21/18 134/89   08/10/18 132/82          (goal 120/80)    All Future Testing planned in [facility]  Lab Frequency Next Occurrence   POCT FECAL IMMUNOCHEMICAL TEST (FIT) Once 01/29/2020               Patient Active Problem List:     Type 2 diabetes mellitus, with long-term current use of insulin (HCC)     Essential hypertension

## 2019-04-09 DIAGNOSIS — I10 ESSENTIAL HYPERTENSION: ICD-10-CM

## 2019-04-09 RX ORDER — METOPROLOL SUCCINATE 25 MG/1
TABLET, EXTENDED RELEASE ORAL
Qty: 30 TABLET | Refills: 3 | Status: SHIPPED | OUTPATIENT
Start: 2019-04-09 | End: 2019-04-12 | Stop reason: SDUPTHER

## 2019-04-09 NOTE — TELEPHONE ENCOUNTER
Last visit:   Last Med refill: 3-8-19  Does patient have enough medication for 72 hours: Yes    Next Visit Date:  No future appointments. Health Maintenance   Topic Date Due    Pneumococcal 0-64 years Vaccine (1 of 1 - PPSV23) 01/13/1974    Diabetic retinal exam  01/13/1978    Hepatitis B Vaccine (1 of 3 - Risk 3-dose series) 01/13/1987    Shingles Vaccine (1 of 2) 01/13/2018    Colon cancer screen colonoscopy  01/13/2018    A1C test (Diabetic or Prediabetic)  04/29/2019    Lipid screen  11/08/2019    Potassium monitoring  11/08/2019    Creatinine monitoring  11/08/2019    Diabetic foot exam  01/29/2020    Diabetic microalbuminuria test  01/29/2020    DTaP/Tdap/Td vaccine (2 - Td) 08/10/2028    Flu vaccine  Completed    HIV screen  Completed       Hemoglobin A1C (%)   Date Value   01/29/2019 11.6 (H)   11/21/2018 12.5   08/10/2018 11.8             ( goal A1C is < 7)   Microalb/Crt. Ratio (mcg/mg creat)   Date Value   01/29/2019 30 (H)     LDL Cholesterol (mg/dL)   Date Value   11/08/2018 101       (goal LDL is <100)   BUN (mg/dL)   Date Value   11/08/2018 17     BP Readings from Last 3 Encounters:   01/29/19 (!) 148/100   11/21/18 134/89   08/10/18 132/82          (goal 120/80)    All Future Testing planned in [facility]  Lab Frequency Next Occurrence   POCT FECAL IMMUNOCHEMICAL TEST (FIT) Once 01/29/2020               Patient Active Problem List:     Type 2 diabetes mellitus, with long-term current use of insulin (HCC)     Essential hypertension           Please address the medication refill and close the encounter. If I can be of assistance, please route to the applicable pool. Thank you.

## 2019-04-12 ENCOUNTER — OFFICE VISIT (OUTPATIENT)
Dept: FAMILY MEDICINE CLINIC | Age: 49
End: 2019-04-12
Payer: MEDICARE

## 2019-04-12 VITALS
BODY MASS INDEX: 33.58 KG/M2 | TEMPERATURE: 98.1 F | HEIGHT: 73 IN | DIASTOLIC BLOOD PRESSURE: 84 MMHG | SYSTOLIC BLOOD PRESSURE: 132 MMHG | WEIGHT: 253.4 LBS | HEART RATE: 86 BPM

## 2019-04-12 DIAGNOSIS — G89.29 CHRONIC BILATERAL LOW BACK PAIN WITHOUT SCIATICA: ICD-10-CM

## 2019-04-12 DIAGNOSIS — I10 ESSENTIAL HYPERTENSION: ICD-10-CM

## 2019-04-12 DIAGNOSIS — M54.50 CHRONIC BILATERAL LOW BACK PAIN WITHOUT SCIATICA: ICD-10-CM

## 2019-04-12 DIAGNOSIS — Z00.00 HEALTHCARE MAINTENANCE: ICD-10-CM

## 2019-04-12 PROCEDURE — 99213 OFFICE O/P EST LOW 20 MIN: CPT | Performed by: HOSPITALIST

## 2019-04-12 PROCEDURE — 90746 HEPB VACCINE 3 DOSE ADULT IM: CPT | Performed by: FAMILY MEDICINE

## 2019-04-12 PROCEDURE — 1036F TOBACCO NON-USER: CPT | Performed by: HOSPITALIST

## 2019-04-12 PROCEDURE — 2022F DILAT RTA XM EVC RTNOPTHY: CPT | Performed by: HOSPITALIST

## 2019-04-12 PROCEDURE — G8427 DOCREV CUR MEDS BY ELIG CLIN: HCPCS | Performed by: HOSPITALIST

## 2019-04-12 PROCEDURE — 3017F COLORECTAL CA SCREEN DOC REV: CPT | Performed by: HOSPITALIST

## 2019-04-12 PROCEDURE — 90732 PPSV23 VACC 2 YRS+ SUBQ/IM: CPT | Performed by: FAMILY MEDICINE

## 2019-04-12 PROCEDURE — G8417 CALC BMI ABV UP PARAM F/U: HCPCS | Performed by: HOSPITALIST

## 2019-04-12 PROCEDURE — 99211 OFF/OP EST MAY X REQ PHY/QHP: CPT | Performed by: HOSPITALIST

## 2019-04-12 PROCEDURE — 3046F HEMOGLOBIN A1C LEVEL >9.0%: CPT | Performed by: HOSPITALIST

## 2019-04-12 RX ORDER — METOPROLOL SUCCINATE 25 MG/1
TABLET, EXTENDED RELEASE ORAL
Qty: 30 TABLET | Refills: 3 | Status: SHIPPED | OUTPATIENT
Start: 2019-04-12 | End: 2019-10-11 | Stop reason: SDUPTHER

## 2019-04-12 RX ORDER — LISINOPRIL 2.5 MG/1
TABLET ORAL
Qty: 30 TABLET | Refills: 3 | Status: SHIPPED | OUTPATIENT
Start: 2019-04-12 | End: 2019-07-09 | Stop reason: SDUPTHER

## 2019-04-12 RX ORDER — TIZANIDINE 2 MG/1
2 TABLET ORAL EVERY 8 HOURS PRN
COMMUNITY
End: 2021-02-11

## 2019-04-12 RX ORDER — CETIRIZINE HYDROCHLORIDE 10 MG/1
10 TABLET ORAL DAILY
Qty: 30 TABLET | Refills: 0 | Status: SHIPPED | OUTPATIENT
Start: 2019-04-12 | End: 2019-05-15 | Stop reason: SDUPTHER

## 2019-04-12 ASSESSMENT — ENCOUNTER SYMPTOMS
NAUSEA: 0
DIARRHEA: 0
SHORTNESS OF BREATH: 0
VOMITING: 0
CONSTIPATION: 0
COUGH: 1
ABDOMINAL PAIN: 0
WHEEZING: 0
BACK PAIN: 0

## 2019-04-12 NOTE — PATIENT INSTRUCTIONS
Thank you for letting us take care of you today. We hope all your questions were addressed. If a question was overlooked or something else comes to mind after you return home, please contact a member of your Care Team listed below. Please make sure you have a routine office visit set up to follow-up on [address]. Your Care Team at [place] [address] is Team #2  Lavelle Mak DO (Faculty)  Jared Calixto MD (Faculty)  Sebastian Mcgowan MD (Resident)  Jazmyne Bridges MD (Resident)  Elise Rivera MD (Resident)  Timothy Ojeda MD (Resident)  Kalli Adams MD (Resident)  DEYANIRA Canseco., [facility]  Alan Villegas, [facility] office)  [facility] office)  Italia Doherty ([address] [facility])  [facility] [place], [place] ([address] )  Itzel Jansen, Ph.D., (Behavioral Services)  Akua Corey [facility] (Clinical Pharmacist)     Office phone number: [phone]    If you need to get in right away due to illness, please be advised we have \"Same Day\" appointments available Monday-Friday. Please call us at [phone] option #3 to schedule your \"Same Day\" appointment.

## 2019-04-12 NOTE — PROGRESS NOTES
Attending Physician Statement  I  have discussed the care of Suleiman Zee including pertinent history and exam findings with the resident. I agree with the assessment, plan and orders as documented by the resident. /84 (Site: Left Upper Arm, Position: Sitting, Cuff Size: Medium Adult) Comment: machine  Pulse 86   Temp 98.1 °F (36.7 °C) (Oral)   Ht 6' 0.99\" (1.854 m)   Wt 253 lb 6.4 oz (114.9 kg)   BMI 33.44 kg/m²    BP Readings from Last 3 Encounters:   04/12/19 132/84   01/29/19 (!) 148/100   11/21/18 134/89     Wt Readings from Last 3 Encounters:   04/12/19 253 lb 6.4 oz (114.9 kg)   01/29/19 248 lb (112.5 kg)   11/21/18 242 lb (109.8 kg)          Diagnosis Orders   1. Diabetes mellitus type 2, uncontrolled, with complications (HCC)  Insulin Pen Needle (BD PEN NEEDLE EM U/F) 32G X 4 MM MISC   2. Essential hypertension  metoprolol succinate (TOPROL XL) 25 MG extended release tablet    lisinopril (PRINIVIL;ZESTRIL) 2.5 MG tablet   3. Healthcare maintenance  Pneumococcal polysaccharide vaccine 23-valent greater than or equal to 3yo subcutaneous/IM    Hep B Vaccine Adult (ENGERIX B)    POCT Fecal Immunochemical Test (FIT)   4. Chronic bilateral low back pain without sciatica  [facility] Physical Therapy [facility]       See orders.       Clover Eden DO 4/12/2019 11:37 AM

## 2019-04-12 NOTE — PROGRESS NOTES
Subjective:    Dane Cade is a 44 y.o. male with  has a past medical history of Cervical disc disease and Diabetes mellitus ([address] Family History   Problem Relation Age of Onset    Diabetes Mother     Diabetes Father     Hypertension Father     Stroke Sister     Other Sister         HIP REPLACEMENT       Presented to the office today for:  Chief Complaint   Patient presents with    Annual Exam     Patient applying for Social Security and is in need of physical per SS    Cough     c/o cough for 10 days.  Other     Needs order for alcohol swab for diabetic testing       HPI   Follow-up of Type 2 diabetes mellitus. Meds - insulin basaglar 40 BID  Compliance- good  Home blood sugar records: fasting range: 200, postprandial range: 200  Any episodes of hypoglycemia? no  Fluctuating blood sugars?no  Weight trend: stable  Current diet: in general, a \"healthy\" diet    Current exercise: none  Known diabetic complications: peripheral neuropathy    Hgb A1c -   Lab Results   Component Value Date    LABA1C 11.6 (H) 01/29/2019     BP -   BP Readings from Last 1 Encounters:   04/12/19 132/84     Lipid Panel -   Lab Results   Component Value Date    HDL 38 11/08/2018    TRIG 172 11/08/2018     Albumin to creatinine Ratio - n/a  Is He on ACE inhibitor or angiotensin II receptor blocker? Yes  Is He on Aspirin? Yes  Eye exam current (within one year): no  Foot exam current (within one year): yes  Is He Smoker? No  Did He receive influenza vaccine within the last 12 months? Yes  Did He receive Pneumococcal vaccine? Yes    Patient has a PMH of chronic neck and back pain. Currently on workers comp, and wants to apply for disability. Also c/o cough for the last 2 weeks, initially had the upper URI sx and now they all resolved apart from dry cough. Review of Systems   Constitutional: Negative for activity change, appetite change, fatigue and unexpected weight change. Respiratory: Positive for cough.  Negative for shortness of breath and wheezing. Cardiovascular: Negative for chest pain, palpitations and leg swelling. Gastrointestinal: Negative for abdominal pain, constipation, diarrhea, nausea and vomiting. Genitourinary: Negative for difficulty urinating, dysuria, flank pain and frequency. Musculoskeletal: Negative for arthralgias, back pain and neck pain. Neurological: Negative for dizziness, syncope, weakness, numbness and headaches. Objective:    /84 (Site: Left Upper Arm, Position: Sitting, Cuff Size: Medium Adult) Comment: machine  Pulse 86   Temp 98.1 °F (36.7 °C) (Oral)   Ht 6' 0.99\" (1.854 m)   Wt 253 lb 6.4 oz (114.9 kg)   BMI 33.44 kg/m²    BP Readings from Last 3 Encounters:   04/12/19 132/84   01/29/19 (!) 148/100   11/21/18 134/89     Physical Exam   Constitutional: He is oriented to person, place, and time. He appears well-developed and well-nourished. Cardiovascular: Normal rate and regular rhythm. Pulmonary/Chest: Effort normal and breath sounds normal. No respiratory distress. Abdominal: Soft. Bowel sounds are normal. There is no tenderness. Neurological: He is alert and oriented to person, place, and time. Nursing note and vitals reviewed. Lab Results   Component Value Date    WBC 5.0 06/06/2018    HGB 14.0 06/06/2018    HCT 43.2 06/06/2018     06/06/2018    CHOL 173 11/08/2018    TRIG 172 (H) 11/08/2018    HDL 38 (L) 11/08/2018     11/08/2018    K 4.9 11/08/2018    CL 97 (L) 11/08/2018    CREATININE 0.67 (L) 11/08/2018    BUN 17 11/08/2018    CO2 26 11/08/2018    LABA1C 11.6 (H) 01/29/2019    LABMICR 30 (H) 01/29/2019     Lab Results   Component Value Date    CALCIUM 9.5 11/08/2018     Lab Results   Component Value Date    LDLCHOLESTEROL 101 11/08/2018       Assessment:     1. Diabetes mellitus type 2, uncontrolled, with complications ([address]    2. Essential hypertension    3. Healthcare maintenance    4.  Chronic bilateral low back pain without sciatica        Plan:   1. Diabetes mellitus type 2, uncontrolled, with complications (HCC)  - L5X of 11.6 in Jan  - pt reports compliant to medications  - will increase lantus to 50 BID and re assess in 4 weeks with repeat A1c  - keep BS log in next visit  - Insulin Pen Needle (BD PEN NEEDLE EM U/F) 32G X 4 MM MISC; PLEASE APPROVE FOR THIS PATIENT'S INSULIN RX. USE TO INJECT INSULIN AS DIRECTED  Dispense: 100 each; Refill: 0    2. Essential hypertension  - stable  - metoprolol succinate (TOPROL XL) 25 MG extended release tablet; take 1 tablet by mouth once daily  Dispense: 30 tablet; Refill: 3  - lisinopril (PRINIVIL;ZESTRIL) 2.5 MG tablet; take 1 tablet by mouth twice a day  Dispense: 30 tablet; Refill: 3    3. Healthcare maintenance  - Pneumococcal polysaccharide vaccine 23-valent greater than or equal to 1yo subcutaneous/IM  - Hep B Vaccine Adult (ENGERIX B)  - POCT Fecal Immunochemical Test (FIT); Future    4. Chronic bilateral low back pain without sciatica  - will refer to PT for functional capacity assessment  - [facility] Physical Therapy - [facility]    Will try oral anti-histamines for the dry cough as most likely post viral cough syndrome, if still persisting in next visit consider LFT to r/o lung disease. Charlesreceived counseling on the following healthy behaviors: nutrition, exercise and medication adherence    Patient given educational materials : see patient instruction   Jesus Castelan received counseling on the following healthy behaviors: nutrition, exercise and medication adherence  Reviewed prior labs and health maintenance. Continue current medications, diet and exercise. Discussed use, benefit, and side effects of prescribed medications. Barriers to medication compliance addressed. Patient given educational materials - see patient instructions. All patient questions answered. Patient voiced understanding. Discussed use, benefit, and side effects of prescribed medications.   Barriers to medicationcompliance addressed. All patient questions answered. Pt voiced understanding. Medications Discontinued During This Encounter   Medication Reason    lisinopril (PRINIVIL;ZESTRIL) 10 MG tablet LIST CLEANUP    insulin glargine (LANTUS) 100 UNIT/ML injection vial LIST CLEANUP    metoprolol succinate (TOPROL XL) 25 MG extended release tablet REORDER    lisinopril (PRINIVIL;ZESTRIL) 2.5 MG tablet REORDER    Insulin Pen Needle (BD PEN NEEDLE EM U/F) 32G X 4 MM MISC REORDER    BASAGLAR KWIKPEN 100 UNIT/ML injection pen REORDER       Return in about 1 month (around 5/10/2019) for BS log.

## 2019-04-16 NOTE — TELEPHONE ENCOUNTER
Please address the medication refill and close the encounter. If I can be of assistance, please route to the applicable pool. Thank you. Last visit: [id number]  Last Med refill:   Does patient have enough medication for 72 hours:  Next Visit Date:  Future Appointments   Date Time Provider Kaley Ocampo   4/23/2019 10:00 AM Shana Rodriguez PT [facility]   5/20/2019  9:45 AM Shayla Webb MD [address] [place] [address] Maintenance   Topic Date Due    Diabetic retinal exam  01/13/1978    Shingles Vaccine (1 of 2) 01/13/2018    Colon cancer screen colonoscopy  01/13/2018    A1C test (Diabetic or Prediabetic)  04/29/2019    Hepatitis B Vaccine (2 of 3 - Risk 3-dose series) 05/10/2019    Lipid screen  11/08/2019    Potassium monitoring  11/08/2019    Creatinine monitoring  11/08/2019    Diabetic foot exam  01/29/2020    Diabetic microalbuminuria test  01/29/2020    DTaP/Tdap/Td vaccine (2 - Td) 08/10/2028    Flu vaccine  Completed    Pneumococcal 0-64 years Vaccine  Completed    HIV screen  Completed       Hemoglobin A1C (%)   Date Value   01/29/2019 11.6 (H)   11/21/2018 12.5   08/10/2018 11.8             ( goal A1C is < 7)   Microalb/Crt.  Ratio (mcg/mg creat)   Date Value   01/29/2019 30 (H)     LDL Cholesterol (mg/dL)   Date Value   11/08/2018 101       (goal LDL is <100)   BUN (mg/dL)   Date Value   11/08/2018 17     BP Readings from Last 3 Encounters:   04/12/19 132/84   01/29/19 (!) 148/100   11/21/18 134/89          (goal 120/80)    All Future Testing planned in [facility]  Lab Frequency Next Occurrence   POCT Fecal Immunochemical Test (FIT) Once 05/03/2019               Patient Active Problem List:     Type 2 diabetes mellitus, with long-term current use of insulin (HCC)     Essential hypertension

## 2019-04-22 RX ORDER — DEXTROSE 4 G
TABLET,CHEWABLE ORAL
Qty: 60 EACH | Refills: 11 | Status: SHIPPED | OUTPATIENT
Start: 2019-04-22

## 2019-04-23 ENCOUNTER — HOSPITAL ENCOUNTER (OUTPATIENT)
Dept: PHYSICAL THERAPY | Age: 49
Setting detail: THERAPIES SERIES
Discharge: HOME | End: 2019-04-23
Payer: COMMERCIAL

## 2019-04-23 NOTE — FLOWSHEET NOTE
[x] [address]  [address]    [phone]  F: [phone]   [] [address]  [facility]   [address]  P: [phone]  F: [phone]  [] [address]  [address] [facility]  P: [phone]  F: [phone]   [] [address] [facility] [address]   P: [phone]  F: [phone]  [] [address]  [place]: [phone]   F: [phone]     Physical Therapy Cancel/No Show note    Date: 2019  Patient: Dane Cade  : 1970  MRN: [id number]    Cancels/No Shows to date:     For today's appointment patient:    []  Cancelled    [] Rescheduled appointment    [x] No-show For Initial Evaluation       Reason given by patient:    []  Patient ill    []  Conflicting appointment    [] No transportation      [] Conflict with work    [] No reason given    [] Weather related    [] Other:      Comments:        [] Next appointment was confirmed    Electronically signed by: Cherylene Durand, PT

## 2019-05-15 DIAGNOSIS — G62.9 NEUROPATHY: ICD-10-CM

## 2019-05-16 RX ORDER — CETIRIZINE HCL 1 MG/ML
SOLUTION, ORAL ORAL
Qty: 30 TABLET | Refills: 0 | Status: SHIPPED | OUTPATIENT
Start: 2019-05-16 | End: 2019-06-17 | Stop reason: SDUPTHER

## 2019-05-16 RX ORDER — GABAPENTIN 600 MG/1
TABLET ORAL
Qty: 90 TABLET | Refills: 1 | Status: SHIPPED | OUTPATIENT
Start: 2019-05-16 | End: 2019-07-14 | Stop reason: SDUPTHER

## 2019-05-20 ENCOUNTER — OFFICE VISIT (OUTPATIENT)
Dept: FAMILY MEDICINE CLINIC | Age: 49
End: 2019-05-20
Payer: MEDICARE

## 2019-05-20 VITALS
TEMPERATURE: 98 F | OXYGEN SATURATION: 100 % | HEART RATE: 97 BPM | DIASTOLIC BLOOD PRESSURE: 88 MMHG | SYSTOLIC BLOOD PRESSURE: 135 MMHG | HEIGHT: 73 IN | BODY MASS INDEX: 33.13 KG/M2 | WEIGHT: 250 LBS

## 2019-05-20 DIAGNOSIS — Z23 NEED FOR PROPHYLACTIC VACCINATION AND INOCULATION AGAINST VARICELLA: ICD-10-CM

## 2019-05-20 DIAGNOSIS — E08.21 DIABETES MELLITUS DUE TO UNDERLYING CONDITION WITH DIABETIC NEPHROPATHY, WITH LONG-TERM CURRENT USE OF INSULIN (HCC): Primary | ICD-10-CM

## 2019-05-20 DIAGNOSIS — Z79.4 DIABETES MELLITUS DUE TO UNDERLYING CONDITION WITH DIABETIC NEPHROPATHY, WITH LONG-TERM CURRENT USE OF INSULIN (HCC): Primary | ICD-10-CM

## 2019-05-20 PROCEDURE — 99213 OFFICE O/P EST LOW 20 MIN: CPT | Performed by: HOSPITALIST

## 2019-05-20 PROCEDURE — G8417 CALC BMI ABV UP PARAM F/U: HCPCS | Performed by: HOSPITALIST

## 2019-05-20 PROCEDURE — 83036 HEMOGLOBIN GLYCOSYLATED A1C: CPT | Performed by: HOSPITALIST

## 2019-05-20 PROCEDURE — 1036F TOBACCO NON-USER: CPT | Performed by: HOSPITALIST

## 2019-05-20 PROCEDURE — 3017F COLORECTAL CA SCREEN DOC REV: CPT | Performed by: HOSPITALIST

## 2019-05-20 PROCEDURE — G8427 DOCREV CUR MEDS BY ELIG CLIN: HCPCS | Performed by: HOSPITALIST

## 2019-05-20 RX ORDER — ATORVASTATIN CALCIUM 40 MG/1
40 TABLET, FILM COATED ORAL NIGHTLY
Qty: 90 TABLET | Refills: 0 | Status: SHIPPED | OUTPATIENT
Start: 2019-05-20 | End: 2019-09-16 | Stop reason: SDUPTHER

## 2019-05-20 ASSESSMENT — ENCOUNTER SYMPTOMS
ABDOMINAL PAIN: 0
VOMITING: 0
BACK PAIN: 0
SHORTNESS OF BREATH: 0
CONSTIPATION: 0
NAUSEA: 0
COUGH: 0
WHEEZING: 0
DIARRHEA: 0

## 2019-05-20 NOTE — PROGRESS NOTES
Attending Physician Statement  I have discussed the care of Rashmi Hawkins, including pertinent history and exam findings,  with the resident. I have reviewed the key elements of all parts of the encounter with the resident. I agree with the assessment, plan and orders as documented by the resident.   (GE Modifier)

## 2019-05-20 NOTE — PROGRESS NOTES
Subjective:    Lo Carter is a 44 y.o. male with  has a past medical history of Cervical disc disease and Diabetes mellitus ([facility] [address] Family History   Problem Relation Age of Onset    Diabetes Mother     Diabetes Father     Hypertension Father     Stroke Sister     Other Sister         HIP REPLACEMENT       Presented to the office today for:  Chief Complaint   Patient presents with    Diabetes    Headache       HPI   Follow-up of Type 2 diabetes mellitus. Meds - insulin   Compliance good  Home blood sugar records: fasting range: 120-200, postprandial range: 200s  Any episodes of hypoglycemia? no  Fluctuating blood sugars? yes   Weight trend: stable  Current diet: in general, an \"unhealthy\" diet  Current exercise: none  Known diabetic complications: none    Hgb A1c -   Lab Results   Component Value Date    LABA1C 11.6 (H) 01/29/2019     BP -   BP Readings from Last 1 Encounters:   05/20/19 135/88     Lipid Panel -   Lab Results   Component Value Date    HDL 38 11/08/2018    TRIG 172 11/08/2018     Albumin to creatinine Ratio - n/a  Is He on ACE inhibitor or angiotensin II receptor blocker? Yes  Is He on Aspirin? Yes  Eye exam current (within one year): no  Foot exam current (within one year): yes  Is He Smoker? No  Did He receive influenza vaccine within the last 12 months? Yes  Did He receive Pneumococcal vaccine? No    Review of Systems   Constitutional: Negative for activity change, appetite change, fatigue and unexpected weight change. Respiratory: Negative for cough, shortness of breath and wheezing. Cardiovascular: Negative for chest pain, palpitations and leg swelling. Gastrointestinal: Negative for abdominal pain, constipation, diarrhea, nausea and vomiting. Genitourinary: Negative for difficulty urinating, dysuria, flank pain and frequency. Musculoskeletal: Negative for arthralgias, back pain and neck pain.    Neurological: Negative for dizziness, syncope, weakness, numbness and headaches. Objective:    /88   Pulse 97   Temp 98 °F (36.7 °C) (Temporal)   Ht 6' 1\" (1.854 m)   Wt 250 lb (113.4 kg)   SpO2 100%   BMI 32.98 kg/m²    BP Readings from Last 3 Encounters:   05/20/19 135/88   04/12/19 132/84   01/29/19 (!) 148/100     Physical Exam   Constitutional: He is oriented to person, place, and time. He appears well-developed and well-nourished. Cardiovascular: Normal rate and regular rhythm. Pulmonary/Chest: Effort normal and breath sounds normal. No respiratory distress. Abdominal: Soft. Bowel sounds are normal. There is no tenderness. Neurological: He is alert and oriented to person, place, and time. Nursing note and vitals reviewed. Lab Results   Component Value Date    WBC 5.0 06/06/2018    HGB 14.0 06/06/2018    HCT 43.2 06/06/2018     06/06/2018    CHOL 173 11/08/2018    TRIG 172 (H) 11/08/2018    HDL 38 (L) 11/08/2018     11/08/2018    K 4.9 11/08/2018    CL 97 (L) 11/08/2018    CREATININE 0.67 (L) 11/08/2018    BUN 17 11/08/2018    CO2 26 11/08/2018    LABA1C 11.6 (H) 01/29/2019    LABMICR 30 (H) 01/29/2019     Lab Results   Component Value Date    CALCIUM 9.5 11/08/2018     Lab Results   Component Value Date    LDLCHOLESTEROL 101 11/08/2018       Assessment:     1. Diabetes mellitus due to underlying condition with diabetic nephropathy, with long-term current use of insulin ([facility] [address]    2. Need for prophylactic vaccination and inoculation against varicella        Plan:   1. Diabetes mellitus due to underlying condition with diabetic nephropathy, with long-term current use of insulin ([facility])  - A1c of 12.6, increased from last visit  - pt wiling to try insulin pump  - refer to endo  - in the meantime will increase basaglar to 60 units BID along with 8 units of novolog plus sliding scale  - continue to monitor  - POCT glycosylated hemoglobin (Hb A1C)  - Ladonna Goodpasture, MD, Endocrinology, [place]    2.  Need for prophylactic vaccination and inoculation against varicella  - zoster recombinant adjuvanted vaccine [facility]) 50 MCG/0.5ML SUSR injection; Inject 0.5 mLs into the muscle once for 1 dose 50 MCG IM then repeat 2-6 months. Dispense: 1 each; Refill: 1      Charlesreceived counseling on the following healthy behaviors: nutrition, exercise and medication adherence    Patient given educational materials : see patient instruction   [facility] received counseling on the following healthy behaviors: nutrition, exercise and medication adherence  Reviewed prior labs and health maintenance. Continue current medications, diet and exercise. Discussed use, benefit, and side effects of prescribed medications. Barriers to medication compliance addressed. Patient given educational materials - see patient instructions. All patient questions answered. Patient voiced understanding. Discussed use, benefit, and side effects of prescribed medications. Barriers to medicationcompliance addressed. All patient questions answered. Pt voiced understanding. Medications Discontinued During This Encounter   Medication Reason    oxyCODONE-acetaminophen (ROXICET) 5-325 MG/5ML solution Therapy completed    atorvastatin (LIPITOR) 40 MG tablet REORDER       Return in about 1 month (around 6/17/2019) for headaches.

## 2019-05-20 NOTE — PROGRESS NOTES
11/08/2019    Potassium monitoring  11/08/2019    Creatinine monitoring  11/08/2019    Diabetic foot exam  01/29/2020    Diabetic microalbuminuria test  01/29/2020    DTaP/Tdap/Td vaccine (2 - Td) 08/10/2028    Flu vaccine  Completed    Pneumococcal 0-64 years Vaccine  Completed    HIV screen  Completed

## 2019-05-27 DIAGNOSIS — Z79.4 TYPE 2 DIABETES MELLITUS WITH COMPLICATION, WITH LONG-TERM CURRENT USE OF INSULIN (HCC): ICD-10-CM

## 2019-05-27 DIAGNOSIS — E11.8 TYPE 2 DIABETES MELLITUS WITH COMPLICATION, WITH LONG-TERM CURRENT USE OF INSULIN (HCC): ICD-10-CM

## 2019-05-28 RX ORDER — CALCIUM CITRATE/VITAMIN D3 200MG-6.25
TABLET ORAL
Qty: 100 STRIP | Refills: 0 | Status: SHIPPED | OUTPATIENT
Start: 2019-05-28 | End: 2019-06-12 | Stop reason: SDUPTHER

## 2019-05-28 RX ORDER — INSULIN ASPART 100 [IU]/ML
INJECTION, SOLUTION INTRAVENOUS; SUBCUTANEOUS
Qty: 15 ML | Refills: 5 | Status: SHIPPED | OUTPATIENT
Start: 2019-05-28 | End: 2022-09-15

## 2019-06-12 DIAGNOSIS — Z79.4 TYPE 2 DIABETES MELLITUS WITH COMPLICATION, WITH LONG-TERM CURRENT USE OF INSULIN (HCC): ICD-10-CM

## 2019-06-12 DIAGNOSIS — E11.8 TYPE 2 DIABETES MELLITUS WITH COMPLICATION, WITH LONG-TERM CURRENT USE OF INSULIN (HCC): ICD-10-CM

## 2019-06-13 RX ORDER — CALCIUM CITRATE/VITAMIN D3 200MG-6.25
TABLET ORAL
Qty: 100 STRIP | Refills: 0 | Status: SHIPPED | OUTPATIENT
Start: 2019-06-13

## 2019-06-18 RX ORDER — CETIRIZINE HCL 1 MG/ML
SOLUTION, ORAL ORAL
Qty: 30 TABLET | Refills: 0 | Status: SHIPPED | OUTPATIENT
Start: 2019-06-18 | End: 2019-07-23 | Stop reason: SDUPTHER

## 2019-07-09 DIAGNOSIS — I10 ESSENTIAL HYPERTENSION: ICD-10-CM

## 2019-07-10 RX ORDER — LISINOPRIL 2.5 MG/1
TABLET ORAL
Qty: 30 TABLET | Refills: 3 | Status: SHIPPED | OUTPATIENT
Start: 2019-07-10 | End: 2019-08-31 | Stop reason: SDUPTHER

## 2019-07-14 DIAGNOSIS — G62.9 NEUROPATHY: ICD-10-CM

## 2019-07-16 RX ORDER — GABAPENTIN 600 MG/1
TABLET ORAL
Qty: 90 TABLET | Refills: 1 | Status: SHIPPED | OUTPATIENT
Start: 2019-07-16 | End: 2019-09-09 | Stop reason: SDUPTHER

## 2019-08-31 DIAGNOSIS — I10 ESSENTIAL HYPERTENSION: ICD-10-CM

## 2019-08-31 NOTE — TELEPHONE ENCOUNTER
E-scribe request for Lisinopril and Cetirizine. Please review and e-scribe if applicable. Last Visit Date:  5/20/19. VM left for patient to schedule an appointment. Next Visit Date:  Visit date not found    Hemoglobin A1C (%)   Date Value   01/29/2019 11.6 (H)   11/21/2018 12.5   08/10/2018 11.8             ( goal A1C is < 7)   Microalb/Crt.  Ratio (mcg/mg creat)   Date Value   01/29/2019 30 (H)     LDL Cholesterol (mg/dL)   Date Value   11/08/2018 101       (goal LDL is <100)   BUN (mg/dL)   Date Value   11/08/2018 17     BP Readings from Last 3 Encounters:   05/20/19 135/88   04/12/19 132/84   01/29/19 (!) 148/100          (goal 120/80)        Patient Active Problem List:     Type 2 diabetes mellitus, with long-term current use of insulin (HCC)     Essential hypertension

## 2019-09-04 RX ORDER — LISINOPRIL 2.5 MG/1
TABLET ORAL
Qty: 30 TABLET | Refills: 3 | Status: SHIPPED | OUTPATIENT
Start: 2019-09-04 | End: 2019-10-11

## 2019-09-09 DIAGNOSIS — G62.9 NEUROPATHY: ICD-10-CM

## 2019-09-09 RX ORDER — GABAPENTIN 600 MG/1
TABLET ORAL
Qty: 90 TABLET | Refills: 1 | Status: SHIPPED | OUTPATIENT
Start: 2019-09-09 | End: 2019-11-04 | Stop reason: SDUPTHER

## 2019-09-16 RX ORDER — ATORVASTATIN CALCIUM 40 MG/1
40 TABLET, FILM COATED ORAL NIGHTLY
Qty: 30 TABLET | Refills: 5 | Status: SHIPPED | OUTPATIENT
Start: 2019-09-16 | End: 2020-04-24

## 2019-10-11 ENCOUNTER — OFFICE VISIT (OUTPATIENT)
Dept: FAMILY MEDICINE CLINIC | Age: 49
End: 2019-10-11
Payer: MEDICARE

## 2019-10-11 VITALS
WEIGHT: 262.4 LBS | SYSTOLIC BLOOD PRESSURE: 153 MMHG | HEART RATE: 101 BPM | TEMPERATURE: 97.2 F | HEIGHT: 73 IN | DIASTOLIC BLOOD PRESSURE: 98 MMHG | BODY MASS INDEX: 34.78 KG/M2

## 2019-10-11 DIAGNOSIS — I10 ESSENTIAL HYPERTENSION: ICD-10-CM

## 2019-10-11 DIAGNOSIS — Z23 NEED FOR PROPHYLACTIC VACCINATION AND INOCULATION AGAINST VARICELLA: ICD-10-CM

## 2019-10-11 DIAGNOSIS — Z12.11 COLON CANCER SCREENING: ICD-10-CM

## 2019-10-11 DIAGNOSIS — R07.89 ATYPICAL CHEST PAIN: Primary | ICD-10-CM

## 2019-10-11 DIAGNOSIS — J30.9 ALLERGIC RHINITIS, UNSPECIFIED SEASONALITY, UNSPECIFIED TRIGGER: ICD-10-CM

## 2019-10-11 PROCEDURE — 1036F TOBACCO NON-USER: CPT | Performed by: STUDENT IN AN ORGANIZED HEALTH CARE EDUCATION/TRAINING PROGRAM

## 2019-10-11 PROCEDURE — G8484 FLU IMMUNIZE NO ADMIN: HCPCS | Performed by: STUDENT IN AN ORGANIZED HEALTH CARE EDUCATION/TRAINING PROGRAM

## 2019-10-11 PROCEDURE — 99213 OFFICE O/P EST LOW 20 MIN: CPT | Performed by: STUDENT IN AN ORGANIZED HEALTH CARE EDUCATION/TRAINING PROGRAM

## 2019-10-11 PROCEDURE — G8427 DOCREV CUR MEDS BY ELIG CLIN: HCPCS | Performed by: STUDENT IN AN ORGANIZED HEALTH CARE EDUCATION/TRAINING PROGRAM

## 2019-10-11 PROCEDURE — G8417 CALC BMI ABV UP PARAM F/U: HCPCS | Performed by: STUDENT IN AN ORGANIZED HEALTH CARE EDUCATION/TRAINING PROGRAM

## 2019-10-11 PROCEDURE — 3017F COLORECTAL CA SCREEN DOC REV: CPT | Performed by: STUDENT IN AN ORGANIZED HEALTH CARE EDUCATION/TRAINING PROGRAM

## 2019-10-11 PROCEDURE — 2022F DILAT RTA XM EVC RTNOPTHY: CPT | Performed by: STUDENT IN AN ORGANIZED HEALTH CARE EDUCATION/TRAINING PROGRAM

## 2019-10-11 PROCEDURE — 3046F HEMOGLOBIN A1C LEVEL >9.0%: CPT | Performed by: STUDENT IN AN ORGANIZED HEALTH CARE EDUCATION/TRAINING PROGRAM

## 2019-10-11 RX ORDER — CETIRIZINE HYDROCHLORIDE 10 MG/1
TABLET ORAL
Qty: 30 TABLET | Refills: 5 | Status: SHIPPED | OUTPATIENT
Start: 2019-10-11 | End: 2020-05-22

## 2019-10-11 RX ORDER — LISINOPRIL 5 MG/1
5 TABLET ORAL DAILY
Qty: 30 TABLET | Refills: 2 | Status: SHIPPED | OUTPATIENT
Start: 2019-10-11 | End: 2019-11-18 | Stop reason: SDUPTHER

## 2019-10-11 RX ORDER — METOPROLOL SUCCINATE 25 MG/1
TABLET, EXTENDED RELEASE ORAL
Qty: 30 TABLET | Refills: 3 | Status: SHIPPED | OUTPATIENT
Start: 2019-10-11 | End: 2020-04-24

## 2019-10-11 RX ORDER — TIZANIDINE 2 MG/1
2 TABLET ORAL EVERY 8 HOURS PRN
Status: CANCELLED | OUTPATIENT
Start: 2019-10-11

## 2019-10-11 RX ORDER — ASPIRIN 81 MG/1
81 TABLET ORAL DAILY
Qty: 30 TABLET | Refills: 5 | Status: SHIPPED | OUTPATIENT
Start: 2019-10-11 | End: 2020-03-18

## 2019-10-11 ASSESSMENT — ENCOUNTER SYMPTOMS
CHEST TIGHTNESS: 0
ABDOMINAL DISTENTION: 0
BACK PAIN: 0
SHORTNESS OF BREATH: 0
WHEEZING: 0
NAUSEA: 0
DIARRHEA: 0
VOMITING: 0
COUGH: 0
ABDOMINAL PAIN: 0

## 2019-10-23 ENCOUNTER — HOSPITAL ENCOUNTER (OUTPATIENT)
Dept: NON INVASIVE DIAGNOSTICS | Age: 49
Discharge: HOME | End: 2019-10-23
Payer: MEDICARE

## 2019-10-23 ENCOUNTER — HOSPITAL ENCOUNTER (OUTPATIENT)
Dept: NUCLEAR MEDICINE | Age: 49
Discharge: HOME | End: 2019-10-25
Payer: MEDICARE

## 2019-10-23 DIAGNOSIS — R07.89 ATYPICAL CHEST PAIN: ICD-10-CM

## 2019-10-23 LAB
LV EF: 52 %
LVEF MODALITY: NORMAL

## 2019-10-23 PROCEDURE — 6360000002 HC RX W HCPCS: Performed by: STUDENT IN AN ORGANIZED HEALTH CARE EDUCATION/TRAINING PROGRAM

## 2019-10-23 PROCEDURE — 2580000003 HC RX 258: Performed by: STUDENT IN AN ORGANIZED HEALTH CARE EDUCATION/TRAINING PROGRAM

## 2019-10-23 PROCEDURE — 3430000000 HC RX DIAGNOSTIC RADIOPHARMACEUTICAL: Performed by: STUDENT IN AN ORGANIZED HEALTH CARE EDUCATION/TRAINING PROGRAM

## 2019-10-23 PROCEDURE — A9500 TC99M SESTAMIBI: HCPCS | Performed by: STUDENT IN AN ORGANIZED HEALTH CARE EDUCATION/TRAINING PROGRAM

## 2019-10-23 PROCEDURE — 78452 HT MUSCLE IMAGE SPECT MULT: CPT

## 2019-10-23 PROCEDURE — 93017 CV STRESS TEST TRACING ONLY: CPT | Performed by: NURSE PRACTITIONER

## 2019-10-23 RX ORDER — SODIUM CHLORIDE 9 MG/ML
500 INJECTION, SOLUTION INTRAVENOUS CONTINUOUS PRN
Status: DISCONTINUED | OUTPATIENT
Start: 2019-10-23 | End: 2019-10-23

## 2019-10-23 RX ORDER — ALBUTEROL SULFATE 90 UG/1
2 AEROSOL, METERED RESPIRATORY (INHALATION) PRN
Status: DISCONTINUED | OUTPATIENT
Start: 2019-10-23 | End: 2019-10-23

## 2019-10-23 RX ORDER — SODIUM CHLORIDE 0.9 % (FLUSH) 0.9 %
10 SYRINGE (ML) INJECTION PRN
Status: DISCONTINUED | OUTPATIENT
Start: 2019-10-23 | End: 2019-10-26 | Stop reason: HOSPADM

## 2019-10-23 RX ORDER — SODIUM CHLORIDE 0.9 % (FLUSH) 0.9 %
10 SYRINGE (ML) INJECTION PRN
Status: DISCONTINUED | OUTPATIENT
Start: 2019-10-23 | End: 2019-10-23

## 2019-10-23 RX ORDER — METOPROLOL TARTRATE 5 MG/5ML
5 INJECTION INTRAVENOUS EVERY 5 MIN PRN
Status: DISCONTINUED | OUTPATIENT
Start: 2019-10-23 | End: 2019-10-23

## 2019-10-23 RX ORDER — AMINOPHYLLINE DIHYDRATE 25 MG/ML
50 INJECTION, SOLUTION INTRAVENOUS PRN
Status: DISCONTINUED | OUTPATIENT
Start: 2019-10-23 | End: 2019-10-23

## 2019-10-23 RX ORDER — NITROGLYCERIN 0.4 MG/1
0.4 TABLET SUBLINGUAL EVERY 5 MIN PRN
Status: DISCONTINUED | OUTPATIENT
Start: 2019-10-23 | End: 2019-10-23

## 2019-10-23 RX ORDER — ATROPINE SULFATE 0.1 MG/ML
0.5 INJECTION INTRAVENOUS EVERY 5 MIN PRN
Status: DISCONTINUED | OUTPATIENT
Start: 2019-10-23 | End: 2019-10-23

## 2019-10-23 RX ADMIN — SODIUM CHLORIDE, PRESERVATIVE FREE 10 ML: 5 INJECTION INTRAVENOUS at 09:43

## 2019-10-23 RX ADMIN — SODIUM CHLORIDE, PRESERVATIVE FREE 10 ML: 5 INJECTION INTRAVENOUS at 11:13

## 2019-10-23 RX ADMIN — TETRAKIS(2-METHOXYISOBUTYLISOCYANIDE)COPPER(I) TETRAFLUOROBORATE 16.8 MILLICURIE: 1 INJECTION, POWDER, LYOPHILIZED, FOR SOLUTION INTRAVENOUS at 09:43

## 2019-10-23 RX ADMIN — Medication 10 ML: at 11:00

## 2019-10-23 RX ADMIN — REGADENOSON 0.4 MG: 0.08 INJECTION, SOLUTION INTRAVENOUS at 11:13

## 2019-10-23 RX ADMIN — TETRAKIS(2-METHOXYISOBUTYLISOCYANIDE)COPPER(I) TETRAFLUOROBORATE 43 MILLICURIE: 1 INJECTION, POWDER, LYOPHILIZED, FOR SOLUTION INTRAVENOUS at 11:13

## 2019-11-04 DIAGNOSIS — G62.9 NEUROPATHY: ICD-10-CM

## 2019-11-06 RX ORDER — GABAPENTIN 600 MG/1
TABLET ORAL
Qty: 90 TABLET | Refills: 0 | Status: SHIPPED | OUTPATIENT
Start: 2019-11-06 | End: 2020-01-06

## 2019-11-10 PROBLEM — Z12.11 COLON CANCER SCREENING: Status: RESOLVED | Noted: 2019-10-11 | Resolved: 2019-11-10

## 2019-11-18 ENCOUNTER — OFFICE VISIT (OUTPATIENT)
Dept: FAMILY MEDICINE CLINIC | Age: 49
End: 2019-11-18
Payer: MEDICARE

## 2019-11-18 VITALS
HEIGHT: 73 IN | DIASTOLIC BLOOD PRESSURE: 88 MMHG | TEMPERATURE: 97.4 F | BODY MASS INDEX: 35.81 KG/M2 | SYSTOLIC BLOOD PRESSURE: 152 MMHG | HEART RATE: 102 BPM | WEIGHT: 270.2 LBS | OXYGEN SATURATION: 99 %

## 2019-11-18 DIAGNOSIS — Z13.220 SCREENING FOR HYPERLIPIDEMIA: ICD-10-CM

## 2019-11-18 DIAGNOSIS — M94.0 COSTOCHONDRITIS: ICD-10-CM

## 2019-11-18 DIAGNOSIS — Z23 NEED FOR PROPHYLACTIC VACCINATION AND INOCULATION AGAINST VARICELLA: ICD-10-CM

## 2019-11-18 DIAGNOSIS — I10 ESSENTIAL HYPERTENSION: Primary | ICD-10-CM

## 2019-11-18 PROCEDURE — 3046F HEMOGLOBIN A1C LEVEL >9.0%: CPT | Performed by: STUDENT IN AN ORGANIZED HEALTH CARE EDUCATION/TRAINING PROGRAM

## 2019-11-18 PROCEDURE — 99211 OFF/OP EST MAY X REQ PHY/QHP: CPT | Performed by: FAMILY MEDICINE

## 2019-11-18 PROCEDURE — 1036F TOBACCO NON-USER: CPT | Performed by: STUDENT IN AN ORGANIZED HEALTH CARE EDUCATION/TRAINING PROGRAM

## 2019-11-18 PROCEDURE — 3017F COLORECTAL CA SCREEN DOC REV: CPT | Performed by: STUDENT IN AN ORGANIZED HEALTH CARE EDUCATION/TRAINING PROGRAM

## 2019-11-18 PROCEDURE — 2022F DILAT RTA XM EVC RTNOPTHY: CPT | Performed by: STUDENT IN AN ORGANIZED HEALTH CARE EDUCATION/TRAINING PROGRAM

## 2019-11-18 PROCEDURE — G8427 DOCREV CUR MEDS BY ELIG CLIN: HCPCS | Performed by: STUDENT IN AN ORGANIZED HEALTH CARE EDUCATION/TRAINING PROGRAM

## 2019-11-18 PROCEDURE — G8417 CALC BMI ABV UP PARAM F/U: HCPCS | Performed by: STUDENT IN AN ORGANIZED HEALTH CARE EDUCATION/TRAINING PROGRAM

## 2019-11-18 PROCEDURE — 99213 OFFICE O/P EST LOW 20 MIN: CPT | Performed by: STUDENT IN AN ORGANIZED HEALTH CARE EDUCATION/TRAINING PROGRAM

## 2019-11-18 PROCEDURE — G8484 FLU IMMUNIZE NO ADMIN: HCPCS | Performed by: STUDENT IN AN ORGANIZED HEALTH CARE EDUCATION/TRAINING PROGRAM

## 2019-11-18 RX ORDER — LISINOPRIL 10 MG/1
10 TABLET ORAL DAILY
Qty: 30 TABLET | Refills: 2 | Status: SHIPPED | OUTPATIENT
Start: 2019-11-18 | End: 2021-02-11 | Stop reason: SDUPTHER

## 2019-11-18 ASSESSMENT — ENCOUNTER SYMPTOMS
CHEST TIGHTNESS: 0
COUGH: 0
ABDOMINAL DISTENTION: 0
VOMITING: 0
SHORTNESS OF BREATH: 0
ABDOMINAL PAIN: 0
WHEEZING: 0
DIARRHEA: 0
NAUSEA: 0
BACK PAIN: 0

## 2020-01-02 RX ORDER — LISINOPRIL 5 MG/1
TABLET ORAL
Qty: 30 TABLET | Refills: 2 | Status: SHIPPED | OUTPATIENT
Start: 2020-01-02 | End: 2020-03-18

## 2020-01-03 NOTE — TELEPHONE ENCOUNTER
Next Visit Date:  No future appointments. Health Maintenance   Topic Date Due    Diabetic retinal exam  01/13/1978    Shingles Vaccine (1 of 2) 01/13/2018    Colon cancer screen colonoscopy  01/13/2018    Hepatitis B vaccine (2 of 3 - Risk 3-dose series) 05/10/2019    Lipid screen  11/08/2019    Potassium monitoring  11/08/2019    Creatinine monitoring  11/08/2019    Flu vaccine (1) 11/18/2020 (Originally 9/1/2019)    Diabetic foot exam  01/29/2020    Diabetic microalbuminuria test  01/29/2020    A1C test (Diabetic or Prediabetic)  02/18/2020    DTaP/Tdap/Td vaccine (2 - Td) 08/10/2028    Pneumococcal 0-64 years Vaccine  Completed    HIV screen  Completed       Hemoglobin A1C (%)   Date Value   01/29/2019 11.6 (H)   11/21/2018 12.5   08/10/2018 11.8             ( goal A1C is < 7)   Microalb/Crt.  Ratio (mcg/mg creat)   Date Value   01/29/2019 30 (H)     LDL Cholesterol (mg/dL)   Date Value   11/08/2018 101       (goal LDL is <100)   BUN (mg/dL)   Date Value   11/08/2018 17     BP Readings from Last 3 Encounters:   11/18/19 (!) 152/88   10/11/19 (!) 153/98   05/20/19 135/88          (goal 120/80)    All Future Testing planned in [facility]  Lab Frequency Next Occurrence   POCT Fecal Immunochemical Test (FIT) Once 04/11/2020   Cologuard (For External Results Only) Once 10/10/2020   Lipid Panel Once 29/96/2465   Basic Metabolic Panel Once 86/26/2109               Patient Active Problem List:     Diabetes mellitus type 2, uncontrolled, with complications ([facility] [address]     Essential hypertension     Atypical chest pain     Need for prophylactic vaccination and inoculation against varicella     Allergic rhinitis

## 2020-01-06 RX ORDER — GABAPENTIN 600 MG/1
TABLET ORAL
Qty: 90 TABLET | Refills: 0 | Status: SHIPPED | OUTPATIENT
Start: 2020-01-06 | End: 2020-01-30

## 2020-01-30 RX ORDER — GABAPENTIN 600 MG/1
TABLET ORAL
Qty: 90 TABLET | Refills: 0 | Status: SHIPPED | OUTPATIENT
Start: 2020-01-30 | End: 2020-03-18

## 2020-03-16 NOTE — TELEPHONE ENCOUNTER
E-scribe request for aspirin and lisinopril. Please review and e-scribe if applicable. Last Visit Date:  11-18-19  Next Visit Date:  Visit date not found    Hemoglobin A1C (%)   Date Value   01/29/2019 11.6 (H)   11/21/2018 12.5   08/10/2018 11.8             ( goal A1C is < 7)   Microalb/Crt.  Ratio (mcg/mg creat)   Date Value   01/29/2019 30 (H)     LDL Cholesterol (mg/dL)   Date Value   11/08/2018 101       (goal LDL is <100)   BUN (mg/dL)   Date Value   11/08/2018 17     BP Readings from Last 3 Encounters:   11/18/19 (!) 152/88   10/11/19 (!) 153/98   05/20/19 135/88          (goal 120/80)        Patient Active Problem List:     Diabetes mellitus type 2, uncontrolled, with complications ([address]     Essential hypertension     Atypical chest pain     Need for prophylactic vaccination and inoculation against varicella     Allergic rhinitis      ----Delayne Romp

## 2020-03-18 RX ORDER — GABAPENTIN 600 MG/1
TABLET ORAL
Qty: 90 TABLET | Refills: 0 | Status: SHIPPED | OUTPATIENT
Start: 2020-03-18 | End: 2020-04-24

## 2020-03-18 RX ORDER — LISINOPRIL 5 MG/1
TABLET ORAL
Qty: 30 TABLET | Refills: 2 | Status: SHIPPED | OUTPATIENT
Start: 2020-03-18 | End: 2020-03-30

## 2020-03-18 RX ORDER — ACETAMINOPHEN/DIPHENHYDRAMINE 500MG-25MG
TABLET ORAL
Qty: 30 TABLET | Refills: 5 | Status: SHIPPED | OUTPATIENT
Start: 2020-03-18 | End: 2020-04-24

## 2020-03-30 RX ORDER — LISINOPRIL 5 MG/1
TABLET ORAL
Qty: 30 TABLET | Refills: 2 | Status: SHIPPED | OUTPATIENT
Start: 2020-03-30 | End: 2020-10-04

## 2020-03-30 NOTE — TELEPHONE ENCOUNTER
E-scribe request for lisinopril. Please review and e-scribe if applicable. Last Visit Date:  11-18-19  Next Visit Date:  Visit date not found    Hemoglobin A1C (%)   Date Value   01/29/2019 11.6 (H)   11/21/2018 12.5   08/10/2018 11.8             ( goal A1C is < 7)   Microalb/Crt.  Ratio (mcg/mg creat)   Date Value   01/29/2019 30 (H)     LDL Cholesterol (mg/dL)   Date Value   11/08/2018 101       (goal LDL is <100)   BUN (mg/dL)   Date Value   11/08/2018 17     BP Readings from Last 3 Encounters:   11/18/19 (!) 152/88   10/11/19 (!) 153/98   05/20/19 135/88          (goal 120/80)        Patient Active Problem List:     Diabetes mellitus type 2, uncontrolled, with complications ([address]     Essential hypertension     Atypical chest pain     Need for prophylactic vaccination and inoculation against varicella     Allergic rhinitis      ----Romy Rivera

## 2020-04-11 NOTE — TELEPHONE ENCOUNTER
Escribe Request for pending medication. Last Visit Date: 11/18/2019  Next Visit Date:  No future appointments. Health Maintenance   Topic Date Due    Diabetic retinal exam  01/13/1978    Hepatitis B vaccine (2 of 3 - CpG risk 3-dose series) 01/13/1987    Shingles Vaccine (1 of 2) 01/13/2018    Colon cancer screen colonoscopy  01/13/2018    Lipid screen  11/08/2019    Potassium monitoring  11/08/2019    Creatinine monitoring  11/08/2019    Diabetic foot exam  01/29/2020    Diabetic microalbuminuria test  01/29/2020    Flu vaccine (Season Ended) 11/18/2020 (Originally 9/1/2020)    A1C test (Diabetic or Prediabetic)  11/18/2020    DTaP/Tdap/Td vaccine (2 - Td) 08/10/2028    Pneumococcal 0-64 years Vaccine  Completed    HIV screen  Completed    Hepatitis A vaccine  Aged Out    Hib vaccine  Aged Out    Meningococcal (ACWY) vaccine  Aged Out       Hemoglobin A1C (%)   Date Value   01/29/2019 11.6 (H)   11/21/2018 12.5   08/10/2018 11.8             ( goal A1C is < 7)   Microalb/Crt. Ratio (mcg/mg creat)   Date Value   01/29/2019 30 (H)     LDL Cholesterol (mg/dL)   Date Value   11/08/2018 101       (goal LDL is <100)   BUN (mg/dL)   Date Value   11/08/2018 17     BP Readings from Last 3 Encounters:   11/18/19 (!) 152/88   10/11/19 (!) 153/98   05/20/19 135/88          (goal 120/80)    All Future Testing planned in [facility]  Lab Frequency Next Occurrence   POCT Fecal Immunochemical Test (FIT) Once 04/11/2020   Cologuard (For External Results Only) Once 10/10/2020   Lipid Panel Once 17/17/8148   Basic Metabolic Panel Once 19/70/9969       Next Visit Date:  No future appointments.       Patient Active Problem List:     Diabetes mellitus type 2, uncontrolled, with complications ([address]     Essential hypertension     Atypical chest pain     Need for prophylactic vaccination and inoculation against varicella     Allergic rhinitis

## 2020-04-24 RX ORDER — ACETAMINOPHEN/DIPHENHYDRAMINE 500MG-25MG
TABLET ORAL
Qty: 30 TABLET | Refills: 5 | Status: SHIPPED | OUTPATIENT
Start: 2020-04-24 | End: 2021-05-17

## 2020-04-24 RX ORDER — METOPROLOL SUCCINATE 25 MG/1
TABLET, EXTENDED RELEASE ORAL
Qty: 30 TABLET | Refills: 3 | Status: SHIPPED | OUTPATIENT
Start: 2020-04-24 | End: 2020-08-31

## 2020-04-24 RX ORDER — ATORVASTATIN CALCIUM 40 MG/1
TABLET, FILM COATED ORAL
Qty: 30 TABLET | Refills: 5 | Status: SHIPPED | OUTPATIENT
Start: 2020-04-24 | End: 2020-10-16

## 2020-04-24 RX ORDER — GABAPENTIN 600 MG/1
TABLET ORAL
Qty: 90 TABLET | Refills: 0 | Status: SHIPPED | OUTPATIENT
Start: 2020-04-24 | End: 2020-05-19

## 2020-05-19 RX ORDER — GABAPENTIN 600 MG/1
TABLET ORAL
Qty: 90 TABLET | Refills: 0 | Status: SHIPPED | OUTPATIENT
Start: 2020-05-19 | End: 2020-06-26

## 2020-05-22 RX ORDER — CETIRIZINE HYDROCHLORIDE 10 MG/1
TABLET ORAL
Qty: 30 TABLET | Refills: 5 | Status: SHIPPED | OUTPATIENT
Start: 2020-05-22 | End: 2021-01-06

## 2020-05-22 NOTE — TELEPHONE ENCOUNTER
E-scribe request for pending medication. Please review and e-scribe if applicable. Last Visit Date:  11-18-19  Next Visit Date:  Visit date not found    Hemoglobin A1C (%)   Date Value   01/29/2019 11.6 (H)   11/21/2018 12.5   08/10/2018 11.8             ( goal A1C is < 7)   Microalb/Crt.  Ratio (mcg/mg creat)   Date Value   01/29/2019 30 (H)     LDL Cholesterol (mg/dL)   Date Value   11/08/2018 101       (goal LDL is <100)   BUN (mg/dL)   Date Value   11/08/2018 17     BP Readings from Last 3 Encounters:   11/18/19 (!) 152/88   10/11/19 (!) 153/98   05/20/19 135/88          (goal 120/80)        Patient Active Problem List:     Diabetes mellitus type 2, uncontrolled, with complications ([address]     Essential hypertension     Atypical chest pain     Need for prophylactic vaccination and inoculation against varicella     Allergic rhinitis      ----Elonda Sensing

## 2020-06-26 RX ORDER — GABAPENTIN 600 MG/1
TABLET ORAL
Qty: 90 TABLET | Refills: 0 | Status: SHIPPED | OUTPATIENT
Start: 2020-06-26 | End: 2020-07-29

## 2020-07-28 NOTE — TELEPHONE ENCOUNTER
Last visit: 11/18/19  Last Med refill 06/26/20  Does patient have enough medication for 72 hours: Yes    Next Visit Date:  No future appointments. Health Maintenance   Topic Date Due    Diabetic retinal exam  01/13/1978    Shingles Vaccine (1 of 2) 01/13/2018    Colon cancer screen colonoscopy  01/13/2018    Hepatitis B vaccine (2 of 3 - Risk 3-dose series) 05/10/2019    Lipid screen  11/08/2019    Potassium monitoring  11/08/2019    Creatinine monitoring  11/08/2019    Diabetic foot exam  01/29/2020    Diabetic microalbuminuria test  01/29/2020    Flu vaccine (1) 09/01/2020    A1C test (Diabetic or Prediabetic)  11/18/2020    DTaP/Tdap/Td vaccine (2 - Td) 08/10/2028    Pneumococcal 0-64 years Vaccine  Completed    HIV screen  Completed    Hepatitis A vaccine  Aged Out    Hib vaccine  Aged Out    Meningococcal (ACWY) vaccine  Aged Out       Hemoglobin A1C (%)   Date Value   01/29/2019 11.6 (H)   11/21/2018 12.5   08/10/2018 11.8             ( goal A1C is < 7)   Microalb/Crt.  Ratio (mcg/mg creat)   Date Value   01/29/2019 30 (H)     LDL Cholesterol (mg/dL)   Date Value   11/08/2018 101       (goal LDL is <100)   BUN (mg/dL)   Date Value   11/08/2018 17     BP Readings from Last 3 Encounters:   11/18/19 (!) 152/88   10/11/19 (!) 153/98   05/20/19 135/88          (goal 120/80)    All Future Testing planned in [facility]  Lab Frequency Next Occurrence   Cologuard (For External Results Only) Once 10/10/2020   Lipid Panel Once 44/84/0028   Basic Metabolic Panel Once 85/98/2598               Patient Active Problem List:     Diabetes mellitus type 2, uncontrolled, with complications ([address]     Essential hypertension     Atypical chest pain     Need for prophylactic vaccination and inoculation against varicella     Allergic rhinitis

## 2020-07-29 RX ORDER — GABAPENTIN 600 MG/1
TABLET ORAL
Qty: 90 TABLET | Refills: 1 | Status: SHIPPED | OUTPATIENT
Start: 2020-07-29 | End: 2020-09-24

## 2020-08-20 NOTE — TELEPHONE ENCOUNTER
Please address the medication refill and close the encounter. If I can be of assistance, please route to the applicable pool. Thank you. Last visit: 11/18/19  Last Med refill: 04/24/20  Does patient have enough medication for 72 hours: No:     Next Visit Date:  No future appointments. Health Maintenance   Topic Date Due    Diabetic retinal exam  01/13/1978    Shingles Vaccine (1 of 2) 01/13/2018    Colon cancer screen colonoscopy  01/13/2018    Hepatitis B vaccine (2 of 3 - Risk 3-dose series) 05/10/2019    Lipid screen  11/08/2019    Potassium monitoring  11/08/2019    Creatinine monitoring  11/08/2019    Diabetic foot exam  01/29/2020    Diabetic microalbuminuria test  01/29/2020    Flu vaccine (1) 09/01/2020    A1C test (Diabetic or Prediabetic)  11/18/2020    DTaP/Tdap/Td vaccine (2 - Td) 08/10/2028    Pneumococcal 0-64 years Vaccine  Completed    HIV screen  Completed    Hepatitis A vaccine  Aged Out    Hib vaccine  Aged Out    Meningococcal (ACWY) vaccine  Aged Out       Hemoglobin A1C (%)   Date Value   01/29/2019 11.6 (H)   11/21/2018 12.5   08/10/2018 11.8             ( goal A1C is < 7)   Microalb/Crt.  Ratio (mcg/mg creat)   Date Value   01/29/2019 30 (H)     LDL Cholesterol (mg/dL)   Date Value   11/08/2018 101       (goal LDL is <100)   BUN (mg/dL)   Date Value   11/08/2018 17     BP Readings from Last 3 Encounters:   11/18/19 (!) 152/88   10/11/19 (!) 153/98   05/20/19 135/88          (goal 120/80)    All Future Testing planned in [facility]  Lab Frequency Next Occurrence   Cologuard (For External Results Only) Once 10/10/2020   Lipid Panel Once 93/57/0561   Basic Metabolic Panel Once 97/61/9217               Patient Active Problem List:     Diabetes mellitus type 2, uncontrolled, with complications ([address]     Essential hypertension     Atypical chest pain     Need for prophylactic vaccination and inoculation against varicella     Allergic rhinitis

## 2020-08-31 RX ORDER — METOPROLOL SUCCINATE 25 MG/1
TABLET, EXTENDED RELEASE ORAL
Qty: 30 TABLET | Refills: 3 | Status: SHIPPED | OUTPATIENT
Start: 2020-08-31 | End: 2020-11-04

## 2020-09-22 NOTE — TELEPHONE ENCOUNTER
Last Visit Date:  11-18-20  Next Visit Date:  Visit date not found    Hemoglobin A1C (%)   Date Value   01/29/2019 11.6 (H)   11/21/2018 12.5   08/10/2018 11.8             ( goal A1C is < 7)   Microalb/Crt.  Ratio (mcg/mg creat)   Date Value   01/29/2019 30 (H)     LDL Cholesterol (mg/dL)   Date Value   11/08/2018 101       (goal LDL is <100)   BUN (mg/dL)   Date Value   11/08/2018 17     BP Readings from Last 3 Encounters:   11/18/19 (!) 152/88   10/11/19 (!) 153/98   05/20/19 135/88          (goal 120/80)        Patient Active Problem List:     Diabetes mellitus type 2, uncontrolled, with complications ([address]     Essential hypertension     Atypical chest pain     Need for prophylactic vaccination and inoculation against varicella     Allergic rhinitis      ----Levorn Mar

## 2020-09-24 RX ORDER — GABAPENTIN 600 MG/1
TABLET ORAL
Qty: 90 TABLET | Refills: 1 | Status: SHIPPED | OUTPATIENT
Start: 2020-09-24 | End: 2020-12-03

## 2020-10-01 NOTE — TELEPHONE ENCOUNTER
Last Visit Date:  11-18-19  Next Visit Date:  Visit date not found    Hemoglobin A1C (%)   Date Value   01/29/2019 11.6 (H)   11/21/2018 12.5   08/10/2018 11.8             ( goal A1C is < 7)   Microalb/Crt.  Ratio (mcg/mg creat)   Date Value   01/29/2019 30 (H)     LDL Cholesterol (mg/dL)   Date Value   11/08/2018 101       (goal LDL is <100)   BUN (mg/dL)   Date Value   11/08/2018 17     BP Readings from Last 3 Encounters:   11/18/19 (!) 152/88   10/11/19 (!) 153/98   05/20/19 135/88          (goal 120/80)        Patient Active Problem List:     Diabetes mellitus type 2, uncontrolled, with complications ([address]     Essential hypertension     Atypical chest pain     Need for prophylactic vaccination and inoculation against varicella     Allergic rhinitis      ----Phuc Salmeron

## 2020-10-04 RX ORDER — LISINOPRIL 5 MG/1
TABLET ORAL
Qty: 30 TABLET | Refills: 2 | Status: SHIPPED | OUTPATIENT
Start: 2020-10-04 | End: 2021-01-11

## 2020-10-16 RX ORDER — ATORVASTATIN CALCIUM 40 MG/1
TABLET, FILM COATED ORAL
Qty: 30 TABLET | Refills: 5 | Status: SHIPPED | OUTPATIENT
Start: 2020-10-16 | End: 2021-04-23

## 2020-10-16 NOTE — TELEPHONE ENCOUNTER
Last visit: 11/18/19 Last Med refill:   Does patient have enough medication for 72 hours:     Next Visit Date:  No future appointments. Health Maintenance   Topic Date Due    Diabetic retinal exam  01/13/1978    Shingles Vaccine (1 of 2) 01/13/2018    Colon cancer screen colonoscopy  01/13/2018    Hepatitis B vaccine (2 of 3 - Risk 3-dose series) 05/10/2019    Lipid screen  11/08/2019    Potassium monitoring  11/08/2019    Creatinine monitoring  11/08/2019    Diabetic foot exam  01/29/2020    Diabetic microalbuminuria test  01/29/2020    Flu vaccine (1) 09/01/2020    A1C test (Diabetic or Prediabetic)  11/18/2020    DTaP/Tdap/Td vaccine (2 - Td) 08/10/2028    Pneumococcal 0-64 years Vaccine  Completed    HIV screen  Completed    Hepatitis A vaccine  Aged Out    Hib vaccine  Aged Out    Meningococcal (ACWY) vaccine  Aged Out       Hemoglobin A1C (%)   Date Value   01/29/2019 11.6 (H)   11/21/2018 12.5   08/10/2018 11.8             ( goal A1C is < 7)   Microalb/Crt.  Ratio (mcg/mg creat)   Date Value   01/29/2019 30 (H)     LDL Cholesterol (mg/dL)   Date Value   11/08/2018 101       (goal LDL is <100)   BUN (mg/dL)   Date Value   11/08/2018 17     BP Readings from Last 3 Encounters:   11/18/19 (!) 152/88   10/11/19 (!) 153/98   05/20/19 135/88          (goal 120/80)    All Future Testing planned in [facility]  Lab Frequency Next Occurrence   Lipid Panel Once 12/20/8820   Basic Metabolic Panel Once 96/50/4569               Patient Active Problem List:     Diabetes mellitus type 2, uncontrolled, with complications ([address]     Essential hypertension     Atypical chest pain     Need for prophylactic vaccination and inoculation against varicella     Allergic rhinitis

## 2020-11-03 NOTE — TELEPHONE ENCOUNTER
E-scribe request for metoprolol succinate . Please review and e-scribe if applicable. Last Visit Date:    Next Visit Date:  Visit date not found    Hemoglobin A1C (%)   Date Value   01/29/2019 11.6 (H)   11/21/2018 12.5   08/10/2018 11.8             ( goal A1C is < 7)   Microalb/Crt.  Ratio (mcg/mg creat)   Date Value   01/29/2019 30 (H)     LDL Cholesterol (mg/dL)   Date Value   11/08/2018 101       (goal LDL is <100)   BUN (mg/dL)   Date Value   11/08/2018 17     BP Readings from Last 3 Encounters:   11/18/19 (!) 152/88   10/11/19 (!) 153/98   05/20/19 135/88          (goal 120/80)        Patient Active Problem List:     Diabetes mellitus type 2, uncontrolled, with complications ([facility] [address]     Essential hypertension     Atypical chest pain     Need for prophylactic vaccination and inoculation against varicella     Allergic rhinitis      ----Poonam Sorensen

## 2020-11-04 RX ORDER — METOPROLOL SUCCINATE 25 MG/1
TABLET, EXTENDED RELEASE ORAL
Qty: 120 TABLET | Refills: 1 | Status: SHIPPED | OUTPATIENT
Start: 2020-11-04 | End: 2021-02-11 | Stop reason: SDUPTHER

## 2020-12-03 RX ORDER — GABAPENTIN 600 MG/1
TABLET ORAL
Qty: 90 TABLET | Refills: 1 | Status: SHIPPED | OUTPATIENT
Start: 2020-12-03 | End: 2021-02-08

## 2021-01-06 DIAGNOSIS — J30.9 ALLERGIC RHINITIS, UNSPECIFIED SEASONALITY, UNSPECIFIED TRIGGER: ICD-10-CM

## 2021-01-06 NOTE — TELEPHONE ENCOUNTER
Please address the medication refill and close the encounter. If I can be of assistance, please route to the applicable pool. Thank you. Last visit: 11/18/19  Last Med refill: 5/22/20  Does patient have enough medication for 72 hours: No:     Next Visit Date:  Future Appointments   Date Time Provider Kaley Ocampo   1/11/2021  3:45 PM Mikael Alejandre MD [facility] Maintenance   Topic Date Due    Hepatitis C screen  1970    Diabetic retinal exam  01/13/1978    Shingles Vaccine (1 of 2) 01/13/2018    Colon cancer screen colonoscopy  01/13/2018    Hepatitis B vaccine (2 of 3 - Risk 3-dose series) 05/10/2019    Lipid screen  11/08/2019    Potassium monitoring  11/08/2019    Creatinine monitoring  11/08/2019    Diabetic foot exam  01/29/2020    Diabetic microalbuminuria test  01/29/2020    Flu vaccine (1) 09/01/2020    A1C test (Diabetic or Prediabetic)  11/18/2020    DTaP/Tdap/Td vaccine (2 - Td) 08/10/2028    Pneumococcal 0-64 years Vaccine  Completed    HIV screen  Completed    Hepatitis A vaccine  Aged Out    Hib vaccine  Aged Out    Meningococcal (ACWY) vaccine  Aged Out       Hemoglobin A1C (%)   Date Value   01/29/2019 11.6 (H)   11/21/2018 12.5   08/10/2018 11.8             ( goal A1C is < 7)   Microalb/Crt.  Ratio (mcg/mg creat)   Date Value   01/29/2019 30 (H)     LDL Cholesterol (mg/dL)   Date Value   11/08/2018 101       (goal LDL is <100)   BUN (mg/dL)   Date Value   11/08/2018 17     BP Readings from Last 3 Encounters:   11/18/19 (!) 152/88   10/11/19 (!) 153/98   05/20/19 135/88          (goal 120/80)    All Future Testing planned in [facility]  Lab Frequency Next Occurrence   Cologuard (For External Results Only) Once 04/19/2021   Lipid Panel Once 59/52/6009   Basic Metabolic Panel Once 33/46/7867               Patient Active Problem List:     Diabetes mellitus type 2, uncontrolled, with complications ([address]     Essential hypertension     Atypical chest pain     Need for prophylactic vaccination and inoculation against varicella     Allergic rhinitis

## 2021-01-11 DIAGNOSIS — I10 ESSENTIAL HYPERTENSION: ICD-10-CM

## 2021-01-11 RX ORDER — CETIRIZINE HYDROCHLORIDE 10 MG/1
TABLET ORAL
Qty: 30 TABLET | Refills: 3 | Status: SHIPPED | OUTPATIENT
Start: 2021-01-11 | End: 2021-10-14 | Stop reason: SDUPTHER

## 2021-01-11 RX ORDER — LISINOPRIL 5 MG/1
TABLET ORAL
Qty: 30 TABLET | Refills: 2 | Status: SHIPPED | OUTPATIENT
Start: 2021-01-11 | End: 2021-02-11 | Stop reason: DRUGHIGH

## 2021-02-08 DIAGNOSIS — G62.9 NEUROPATHY: ICD-10-CM

## 2021-02-08 RX ORDER — GABAPENTIN 600 MG/1
TABLET ORAL
Qty: 90 TABLET | Refills: 2 | Status: SHIPPED | OUTPATIENT
Start: 2021-02-08 | End: 2021-05-14

## 2021-02-08 NOTE — TELEPHONE ENCOUNTER
Last visit:   Last Med refill:   Does patient have enough medication for 72 hours: No:     Next Visit Date:  Future Appointments   Date Time Provider Kaley Ocampo   2/11/2021  2:45 PM Danuta Arias MD [address] [place] [address] Maintenance   Topic Date Due    Hepatitis C screen  1970    Diabetic retinal exam  01/13/1978    Shingles Vaccine (1 of 2) 01/13/2018    Colon cancer screen colonoscopy  01/13/2018    Hepatitis B vaccine (2 of 3 - Risk 3-dose series) 05/10/2019    Lipid screen  11/08/2019    Potassium monitoring  11/08/2019    Creatinine monitoring  11/08/2019    Diabetic foot exam  01/29/2020    Diabetic microalbuminuria test  01/29/2020    Flu vaccine (1) 09/01/2020    A1C test (Diabetic or Prediabetic)  11/18/2020    Annual Wellness Visit (AWV)  01/30/2021    DTaP/Tdap/Td vaccine (2 - Td) 08/10/2028    Pneumococcal 0-64 years Vaccine  Completed    HIV screen  Completed    Hepatitis A vaccine  Aged Out    Hib vaccine  Aged Out    Meningococcal (ACWY) vaccine  Aged Out       Hemoglobin A1C (%)   Date Value   01/29/2019 11.6 (H)   11/21/2018 12.5   08/10/2018 11.8             ( goal A1C is < 7)   Microalb/Crt.  Ratio (mcg/mg creat)   Date Value   01/29/2019 30 (H)     LDL Cholesterol (mg/dL)   Date Value   11/08/2018 101       (goal LDL is <100)   BUN (mg/dL)   Date Value   11/08/2018 17     BP Readings from Last 3 Encounters:   11/18/19 (!) 152/88   10/11/19 (!) 153/98   05/20/19 135/88          (goal 120/80)    All Future Testing planned in [facility]  Lab Frequency Next Occurrence   Cologuard (For External Results Only) Once 04/19/2021   Lipid Panel Once 44/92/8223   Basic Metabolic Panel Once 17/41/6970               Patient Active Problem List:     Diabetes mellitus type 2, uncontrolled, with complications ([address]     Essential hypertension     Atypical chest pain     Need for prophylactic vaccination and inoculation against varicella     Allergic rhinitis           Please address the medication refill and close the encounter. If I can be of assistance, please route to the applicable pool. Thank you.

## 2021-02-11 ENCOUNTER — OFFICE VISIT (OUTPATIENT)
Dept: FAMILY MEDICINE CLINIC | Age: 51
End: 2021-02-11
Payer: MEDICARE

## 2021-02-11 VITALS
DIASTOLIC BLOOD PRESSURE: 101 MMHG | TEMPERATURE: 97.1 F | BODY MASS INDEX: 35.94 KG/M2 | HEART RATE: 93 BPM | SYSTOLIC BLOOD PRESSURE: 169 MMHG | WEIGHT: 271.2 LBS | HEIGHT: 73 IN

## 2021-02-11 DIAGNOSIS — Z11.59 NEED FOR HEPATITIS C SCREENING TEST: ICD-10-CM

## 2021-02-11 DIAGNOSIS — I10 ESSENTIAL HYPERTENSION: ICD-10-CM

## 2021-02-11 DIAGNOSIS — R00.8 GALLOP RHYTHM: ICD-10-CM

## 2021-02-11 DIAGNOSIS — Z12.11 COLON CANCER SCREENING: Primary | ICD-10-CM

## 2021-02-11 DIAGNOSIS — R60.9 PITTING EDEMA: ICD-10-CM

## 2021-02-11 PROCEDURE — 99213 OFFICE O/P EST LOW 20 MIN: CPT | Performed by: STUDENT IN AN ORGANIZED HEALTH CARE EDUCATION/TRAINING PROGRAM

## 2021-02-11 RX ORDER — METOPROLOL SUCCINATE 25 MG/1
TABLET, EXTENDED RELEASE ORAL
Qty: 90 TABLET | Refills: 1 | Status: SHIPPED | OUTPATIENT
Start: 2021-02-11 | End: 2021-05-21 | Stop reason: SDUPTHER

## 2021-02-11 RX ORDER — LISINOPRIL 10 MG/1
10 TABLET ORAL DAILY
Qty: 30 TABLET | Refills: 2 | Status: SHIPPED | OUTPATIENT
Start: 2021-02-11 | End: 2021-04-05 | Stop reason: SDUPTHER

## 2021-02-11 ASSESSMENT — ENCOUNTER SYMPTOMS
SORE THROAT: 0
VOMITING: 0
NAUSEA: 0
DIARRHEA: 0
ABDOMINAL PAIN: 0
RHINORRHEA: 0
SHORTNESS OF BREATH: 0
COUGH: 0
WHEEZING: 0

## 2021-02-11 ASSESSMENT — PATIENT HEALTH QUESTIONNAIRE - PHQ9
SUM OF ALL RESPONSES TO PHQ QUESTIONS 1-9: 0
1. LITTLE INTEREST OR PLEASURE IN DOING THINGS: 0

## 2021-02-11 NOTE — PROGRESS NOTES
[facility] Medicine Residency Program - Outpatient Note      Subjective:    Milagros Moralez is a 46 y.o. male with  has a past medical history of Cervical disc disease and Diabetes mellitus ([address] Presented to the office today for:  Chief Complaint   Patient presents with    Medication Refill     Follow up for refills       HPI  Established patient who has not been seen for the past 2 years. Type 2 diabetes: Patient has an insulin pump, he is lost to follow-up with endocrinology but he has a visit in the next couple of days. Hypertension: Patient has a history of hypertension, blood pressure today's visit was elevated 169/101. Patient is in pain because he has a dental cavity and will be seeing his dentist soon but that does not explain such elevated blood pressure. He is currently taking lisinopril 5 mg. He was previously taking Toprol-XL 25 mg but he is not had that medication 1 month. We discussed increasing lisinopril to 10 mg and restarting Toprol-XL and following up in 1 month for BP check. Chest pain: Patient complains of right-sided chest pain that occasionally happens when he sitting the last for 30 to 40 seconds and resolves on its own. Not associated with activity not associate with diaphoresis or dyspnea. Patient ambulates with a cane. Pain is reproducible on exam.  Likely noncardiac in nature. Patient complains of erectile dysfunction for the past 2 months. We discussed possible intervention for this once we have his blood pressure and blood glucose under better control. Patient is in agreement with this plan. Review of Systems   Constitutional: Negative for chills, diaphoresis and fever. HENT: Negative for congestion, rhinorrhea and sore throat. Respiratory: Negative for cough, shortness of breath and wheezing. Cardiovascular: Positive for leg swelling. Negative for chest pain and palpitations. Gastrointestinal: Negative for abdominal pain, diarrhea, nausea and vomiting. Genitourinary: Negative for difficulty urinating and dysuria. Musculoskeletal: Positive for myalgias. Neurological: Negative for dizziness, light-headedness and headaches. The patient has a   Family History   Problem Relation Age of Onset    Diabetes Mother     Diabetes Father     Hypertension Father     Stroke Sister     Other Sister         HIP REPLACEMENT       Objective:    Ht 6' 0.99\" (1.854 m)   BMI 35.66 kg/m²    BP Readings from Last 3 Encounters:   11/18/19 (!) 152/88   10/11/19 (!) 153/98   05/20/19 135/88       Physical Exam  Vitals signs reviewed. Constitutional:       General: He is not in acute distress. Appearance: He is not diaphoretic. HENT:      Head: Normocephalic and atraumatic. Mouth/Throat:      Mouth: Mucous membranes are moist.      Comments: Dental cavity left lower jaw  Eyes:      Extraocular Movements: Extraocular movements intact. Conjunctiva/sclera: Conjunctivae normal.   Cardiovascular:      Rate and Rhythm: Normal rate and regular rhythm. Pulses: Normal pulses. Heart sounds: Gallop present. Pulmonary:      Effort: Pulmonary effort is normal. No respiratory distress. Breath sounds: Normal breath sounds. No wheezing or rales. Abdominal:      General: Bowel sounds are normal. There is no distension. Palpations: Abdomen is soft. Tenderness: There is no abdominal tenderness. There is no guarding. Musculoskeletal:      Right lower leg: Edema (1+ bilateral) present. Left lower leg: Edema present. Comments: Tenderness to palpation of right pectoralis major   Neurological:      General: No focal deficit present. Mental Status: He is alert.          Lab Results   Component Value Date    WBC 5.0 06/06/2018    HGB 14.0 06/06/2018    HCT 43.2 06/06/2018     06/06/2018    CHOL 173 11/08/2018    TRIG 172 (H) 11/08/2018 HDL 38 (L) 11/08/2018     11/08/2018    K 4.9 11/08/2018    CL 97 (L) 11/08/2018    CREATININE 0.67 (L) 11/08/2018    BUN 17 11/08/2018    CO2 26 11/08/2018    LABA1C 11.6 (H) 01/29/2019    LABMICR 30 (H) 01/29/2019     Lab Results   Component Value Date    CALCIUM 9.5 11/08/2018     Lab Results   Component Value Date    LDLCHOLESTEROL 101 11/08/2018       Assessment and Plan:    1. Essential hypertension  -We will increase lisinopril from 5 to 10 mg and restart Toprol-XL 25 mg. Follow-up in 4 weeks. - lisinopril (PRINIVIL;ZESTRIL) 10 MG tablet; Take 1 tablet by mouth daily  Dispense: 30 tablet; Refill: 2  - metoprolol succinate (TOPROL XL) 25 MG extended release tablet; Take one tablet once a day  Dispense: 90 tablet; Refill: 1  - Lipid Panel; Future  - Comprehensive Metabolic Panel; Future  - ECHO Complete 2D W Doppler W Color; Future    2. Pitting edema  -Concern for possible heart failure, will send patient for echocardiogram follow-up  - ECHO Complete 2D W Doppler W Color; Future    3. Gallop rhythm  -See problem 2   - ECHO Complete 2D W Doppler W Color; Future    4. Diabetes mellitus type 2, uncontrolled, with complications ([facility]  -Patient will follow up with endocrinology in the next couple of days, will defer to their recommendations. - Microalbumin, Ur; Future    5. Colon cancer screening  - Cologuard; Future    6. Need for hepatitis C screening test  - Hepatitis C Antibody;  Future          Requested Prescriptions     Signed Prescriptions Disp Refills    lisinopril (PRINIVIL;ZESTRIL) 10 MG tablet 30 tablet 2     Sig: Take 1 tablet by mouth daily    metoprolol succinate (TOPROL XL) 25 MG extended release tablet 90 tablet 1     Sig: Take one tablet once a day       Medications Discontinued During This Encounter   Medication Reason    lisinopril (PRINIVIL;ZESTRIL) 5 MG tablet DOSE ADJUSTMENT    tiZANidine (ZANAFLEX) 2 MG tablet LIST CLEANUP  lisinopril (PRINIVIL;ZESTRIL) 10 MG tablet REORDER    metoprolol succinate (TOPROL XL) 25 MG extended release tablet REORDER       Raul received counseling on the following healthy behaviors: nutrition, exercise and medication adherence    Discussed use,benefit, and side effects of prescribed medications. Barriers to medication compliance addressed. All patient questions answered. Pt voiced understanding. Return in about 4 weeks (around 3/11/2021) for HTN REESE. Disclaimer: Some orall of this note was transcribed using voice-recognition software. This may cause typographical errors occasionally. Although all effort is made to fix these errors, please do not hesitate to contact our office if there [place] concern with the understanding of this note.

## 2021-02-11 NOTE — PROGRESS NOTES
Visit Information    Have you changed or started any medications since your last visit including any over-the-counter medicines, vitamins, or herbal medicines? no   Have you stopped taking any of your medications? Is so, why? -  no  Are you having any side effects from any of your medications? - no    Have you seen any other physician or provider since your last visit?  no   Have you had any other diagnostic tests since your last visit?  no   Have you been seen in the emergency room and/or had an admission in a hospital since we last saw you?  no   Have you had your routine dental cleaning in the past 6 months?  no     Do you have an active MyChart account? If no, what is the barrier? Yes    Patient Care Team:  Jammie Ellsworth MD as PCP - General (Family Medicine)    Medical History Review  Past Medical, Family, and Social History reviewed and does not contribute to the patient presenting condition    Health Maintenance   Topic Date Due    Hepatitis C screen  1970    Diabetic retinal exam  01/13/1978    Colon cancer screen colonoscopy  01/13/2018    Hepatitis B vaccine (2 of 3 - Risk 3-dose series) 05/10/2019    Lipid screen  11/08/2019    Potassium monitoring  11/08/2019    Creatinine monitoring  11/08/2019    Diabetic foot exam  01/29/2020    Diabetic microalbuminuria test  01/29/2020    Shingles Vaccine (2 of 2) 02/01/2020    Flu vaccine (1) 09/01/2020    Annual Wellness Visit (AWV)  01/30/2021    A1C test (Diabetic or Prediabetic)  11/09/2021    DTaP/Tdap/Td vaccine (2 - Td) 08/10/2028    Pneumococcal 0-64 years Vaccine  Completed    HIV screen  Completed    Hepatitis A vaccine  Aged Out    Hib vaccine  Aged Out    Meningococcal (ACWY) vaccine  Aged Out     Vaccine Information Sheet, \"Influenza - Inactivated\"  given to Thania Song, or parent/legal guardian of  Thania Song and verbalized understanding.     Patient responses: Have you ever had a reaction to a flu vaccine? No  Do you have any current illness? No  Have you ever had Guillian Bixby Syndrome? No  Do you have a serious allergy to any of the following: Neomycin, Polymyxin, Thimerosal, eggs or egg products? No    Flu vaccine given per order. Please see immunization tab. Risks and benefits explained. Current VIS given.

## 2021-02-11 NOTE — PATIENT INSTRUCTIONS
Thank you for letting us take care of you today. We hope all your questions were addressed. If a question was overlooked or something else comes to mind after you return home, please contact a member of your Care Team listed below. Your Care Team at [place] [address] is Team #2  Jazmyne Palencia DO (Faculty)  Carlos Rivas (Faculty)  Margaree Osgood, MD (Resident)  Brian Brunner MD (Resident)  Leti Downing MD (Resident)  Phoebe Iverson MD (Resident)  Natasha Hendricks MD (Resident)  DEYANIRA Beltran ,Femi Monjosselin ([facility] office)  Roderick Vasquez (Clinical Practice Manager)  Lyla Clemens, [address] [place] [address] (Clinical Pharmacist)     Office phone number: [phone]    If you need to get in right away due to illness, please be advised we have \"Same Day\" appointments available Monday-Friday. Please call us at [phone] option #3 to schedule your \"Same Day\" appointment. Patient Education        Influenza (Flu) Vaccine (Inactivated or Recombinant): What You Need to Know  Why get vaccinated? Influenza vaccine can prevent influenza (flu). Flu is a contagious disease that spreads around the [place] every year, usually between October and May. Anyone can get the flu, but it is more dangerous for some people. Infants and young children, people 72years of age and older, pregnant women, and people with certain health conditions or a weakened immune system are at greatest risk of flu complications. Pneumonia, bronchitis, sinus infections and ear infections are examples of flu-related complications. If you have a medical condition, such as heart disease, cancer or diabetes, flu can make it worse. Flu can cause fever and chills, sore throat, muscle aches, fatigue, cough, headache, and runny or stuffy nose. Some people may have vomiting and diarrhea, though this is more common in children than adults. Each year, thousands of people in the [facility] die from flu, and many more are hospitalized. Flu vaccine prevents millions of illnesses and flu-related visits to the doctor each year. Influenza vaccine  CDC recommends everyone 10months of age and older get vaccinated every flu season. Children 6 months through 6years of age may need 2 doses during a single flu season. Everyone else needs only 1 dose each flu season. It takes about 2 weeks for protection to develop after vaccination. There are many flu viruses, and they are always changing. Each year a new flu vaccine is made to protect against three or four viruses that are likely to cause disease in the upcoming flu season. Even when the vaccine doesn't exactly match these viruses, it may still provide some protection. Influenza vaccine does not cause flu. Influenza vaccine may be given at the same time as other vaccines. Talk with your health care provider  Tell your vaccine provider if the person getting the vaccine:  · Has had an allergic reaction after a previous dose of influenza vaccine, or has any severe, life-threatening allergies. · Has ever had Guillain-Barré Syndrome (also called GBS). In some cases, your health care provider may decide to postpone influenza vaccination to a future visit. People with minor illnesses, such as a cold, may be vaccinated. People who are moderately or severely ill should usually wait until they recover before getting influenza vaccine. Your health care provider can give you more information. Risks of a vaccine reaction  · Soreness, redness, and swelling where shot is given, fever, muscle aches, and headache can happen after influenza vaccine. · There may be a very small increased risk of Guillain-Barré Syndrome (GBS) after inactivated influenza vaccine (the flu shot). Gladys Early children who get the flu shot along with pneumococcal vaccine (PCV13), and/or DTaP vaccine at the same time might be slightly more likely to have a seizure caused by fever. Tell your health care provider if a child who is getting flu vaccine has ever had a seizure. People sometimes faint after medical procedures, including vaccination. Tell your provider if you feel dizzy or have vision changes or ringing in the ears. As with any medicine, there is a very remote chance of a vaccine causing a severe allergic reaction, other serious injury, or death. What if there is a serious problem? An allergic reaction could occur after the vaccinated person leaves the clinic. If you see signs of a severe allergic reaction (hives, swelling of the face and throat, difficulty breathing, a fast heartbeat, dizziness, or weakness), call 9-1-1 and get the person to the nearest hospital.  For other signs that concern you, call your health care provider. Adverse reactions should be reported to the Vaccine Adverse Event Reporting System (VAERS). Your health care provider will usually file this report, or you can do it yourself. Visit the VAERS website at www.vaers. hhs.gov or call 2-787.284.6585. VAERS is only for reporting reactions, and VAERS staff do not give medical advice. The National Vaccine Injury Compensation Program  The National Vaccine Injury Compensation Program (VICP) is a federal program that was created to compensate people who may have been injured by certain vaccines. Visit the VICP website at www.hrsa.gov/vaccinecompensation or call 4-987.189.5334 to learn about the program and about filing a claim. There is a time limit to file a claim for compensation. How can I learn more? · Ask your healthcare provider. · Call your local or state health department. · Contact the Centers for Disease Control and Prevention (CDC):  ? Call 8-200.320.9044 (1-800-CDC-INFO) or  ?  Visit CDC's website at www.cdc.gov/flu Vaccine Information Statement (Interim)  Inactivated Influenza Vaccine  8/15/2019  42 ANISHA Clement 114TQ-90  Department of Health and Human Services  Centers for Disease Control and Prevention  Many Vaccine Information Statements are available in [place] and other languages. See www.immunize.org/vis. Muchas hojas de información sobre vacunas están disponibles en español y en otros idiomas. Visite www.immunize.org/vis. Care instructions adapted under license by [facility] ([facility]). If you have questions about a medical condition or this instruction, always ask your healthcare professional. [place] [address] any warranty or liability for your use of this information. Patient Education        Hepatitis B Vaccine: What You Need to Know  Why get vaccinated? Hepatitis B vaccine can prevent hepatitis B. Hepatitis B is a liver disease that can cause mild illness lasting a few weeks, or it can lead to a serious, lifelong illness. · Acute hepatitis B infection is a short-term illness that can lead to fever, fatigue, loss of appetite, nausea, vomiting, jaundice (yellow skin or eyes, dark urine, sid-colored bowel movements), and pain in the muscles, joints, and stomach. · Chronic hepatitis B infection is a long-term illness that occurs when the hepatitis B virus remains in a person's body. Most people who go on to develop chronic hepatitis B do not have symptoms, but it is still very serious and can lead to liver damage (cirrhosis), liver cancer, and death. Chronically-infected people can spread hepatitis B virus to others, even if they do not feel or look sick themselves. Hepatitis B is spread when blood, semen, or other body fluid infected with the hepatitis B virus enters the body of a person who is not infected.  People can become infected through:  · Birth (if a mother has hepatitis B, her baby can become infected)  · Sharing items such as razors or toothbrushes with an infected person In some cases, your health care provider may decide to postpone hepatitis B vaccination to a future visit. People with minor illnesses, such as a cold, may be vaccinated. People who are moderately or severely ill should usually wait until they recover before getting hepatitis B vaccine. Your health care provider can give you more information. Risks of a vaccine reaction  · Soreness where the shot is given or fever can happen after hepatitis B vaccine. People sometimes faint after medical procedures, including vaccination. Tell your provider if you feel dizzy or have vision changes or ringing in the ears. As with any medicine, there is a very remote chance of a vaccine causing a severe allergic reaction, other serious injury, or death. What if there is a serious problem? An allergic reaction could occur after the vaccinated person leaves the clinic. If you see signs of a severe allergic reaction (hives, swelling of the face and throat, difficulty breathing, a fast heartbeat, dizziness, or weakness), call 9-1-1 and get the person to the nearest hospital.  For other signs that concern you, call your health care provider. Adverse reactions should be reported to the Vaccine Adverse Event Reporting System (VAERS). Your health care provider will usually file this report, or you can do it yourself. Visit the VAERS website at www.vaers. hhs.gov or call 2-324.790.1616. VAERS is only for reporting reactions, and VAERS staff do not give medical advice. The National Vaccine Injury Compensation Program  The National Vaccine Injury Compensation Program (VICP) is a federal program that was created to compensate people who may have been injured by certain vaccines. Visit the VICP website at www.hrsa.gov/vaccinecompensation or call 3-296.288.2958 to learn about the program and about filing a claim. There is a time limit to file a claim for compensation. How can I learn more? · Ask your healthcare provider. · Call your local or state health department. · Contact the Centers for Disease Control and Prevention (CDC):  ? Call 3-678.156.1332 (1-800-CDC-INFO) or  ? Visit CDC's website at www.cdc.gov/vaccines. Vaccine Information Statement (Interim)  Hepatitis B Vaccine  8/15/2019  42 U. S.C. § 300aa-26  [place]. S. Department of Health and Human Services  Centers for Disease Control and Prevention  Many Vaccine Information Statements are available in [place] and other languages. See www.immunize.org/vis. Hojas de información sobre vacunas están disponibles en español y en otros idiomas. Visite www.immunize.org/vis. Care instructions adapted under license by [facility] ([facility]). If you have questions about a medical condition or this instruction, always ask your healthcare professional. [address] any warranty or liability for your use of this information.

## 2021-02-12 ENCOUNTER — HOSPITAL ENCOUNTER (OUTPATIENT)
Age: 51
Setting detail: SPECIMEN
Discharge: HOME | End: 2021-02-12
Payer: MEDICARE

## 2021-02-12 DIAGNOSIS — Z11.59 NEED FOR HEPATITIS C SCREENING TEST: ICD-10-CM

## 2021-02-12 DIAGNOSIS — I10 ESSENTIAL HYPERTENSION: ICD-10-CM

## 2021-02-12 LAB
ALBUMIN SERPL-MCNC: 4.3 G/DL (ref 3.5–5.2)
ALBUMIN SERPL-MCNC: 4.3 G/DL (ref 3.5–5.2)
ALBUMIN/GLOBULIN RATIO: 1.2 (ref 1–2.5)
ALBUMIN/GLOBULIN RATIO: 1.2 (ref 1–2.5)
ALP BLD-CCNC: 88 U/L (ref 40–129)
ALP BLD-CCNC: 88 U/L (ref 40–129)
ALT SERPL-CCNC: 27 U/L (ref 5–41)
ALT SERPL-CCNC: 27 U/L (ref 5–41)
ANION GAP SERPL CALCULATED.3IONS-SCNC: 7 MMOL/L (ref 9–17)
ANION GAP SERPL CALCULATED.3IONS-SCNC: 7 MMOL/L (ref 9–17)
AST SERPL-CCNC: 26 U/L
AST SERPL-CCNC: 26 U/L
BILIRUB SERPL-MCNC: 0.58 MG/DL (ref 0.3–1.2)
BILIRUB SERPL-MCNC: 0.58 MG/DL (ref 0.3–1.2)
BUN BLDV-MCNC: 18 MG/DL (ref 6–20)
BUN BLDV-MCNC: 18 MG/DL (ref 6–20)
BUN/CREAT BLD: ABNORMAL (ref 9–20)
BUN/CREAT BLD: ABNORMAL (ref 9–20)
CALCIUM SERPL-MCNC: 9.5 MG/DL (ref 8.6–10.4)
CALCIUM SERPL-MCNC: 9.5 MG/DL (ref 8.6–10.4)
CHLORIDE BLD-SCNC: 103 MMOL/L (ref 98–107)
CHLORIDE BLD-SCNC: 103 MMOL/L (ref 98–107)
CHOLESTEROL/HDL RATIO: 2.6
CHOLESTEROL/HDL RATIO: 2.6
CHOLESTEROL: 95 MG/DL
CHOLESTEROL: 95 MG/DL
CO2: 31 MMOL/L (ref 20–31)
CO2: 31 MMOL/L (ref 20–31)
CREAT SERPL-MCNC: 0.73 MG/DL (ref 0.7–1.2)
CREAT SERPL-MCNC: 0.73 MG/DL (ref 0.7–1.2)
CREATININE URINE: 119.1 MG/DL (ref 39–259)
GFR AFRICAN AMERICAN: >60 ML/MIN
GFR AFRICAN AMERICAN: >60 ML/MIN
GFR NON-AFRICAN AMERICAN: >60 ML/MIN
GFR NON-AFRICAN AMERICAN: >60 ML/MIN
GFR SERPL CREATININE-BSD FRML MDRD: ABNORMAL ML/MIN/{1.73_M2}
GLUCOSE BLD-MCNC: 158 MG/DL (ref 70–99)
GLUCOSE BLD-MCNC: 158 MG/DL (ref 70–99)
HDLC SERPL-MCNC: 36 MG/DL
HDLC SERPL-MCNC: 36 MG/DL
HEPATITIS C ANTIBODY: NONREACTIVE
LDL CHOLESTEROL: 46 MG/DL (ref 0–130)
LDL CHOLESTEROL: 46 MG/DL (ref 0–130)
MICROALBUMIN/CREAT 24H UR: 38 MG/L
MICROALBUMIN/CREAT UR-RTO: 32 MCG/MG CREAT
POTASSIUM SERPL-SCNC: 4.5 MMOL/L (ref 3.7–5.3)
POTASSIUM SERPL-SCNC: 4.5 MMOL/L (ref 3.7–5.3)
SODIUM BLD-SCNC: 141 MMOL/L (ref 135–144)
SODIUM BLD-SCNC: 141 MMOL/L (ref 135–144)
TOTAL PROTEIN: 8 G/DL (ref 6.4–8.3)
TOTAL PROTEIN: 8 G/DL (ref 6.4–8.3)
TRIGL SERPL-MCNC: 67 MG/DL
TRIGL SERPL-MCNC: 67 MG/DL
VLDLC SERPL CALC-MCNC: ABNORMAL MG/DL (ref 1–30)
VLDLC SERPL CALC-MCNC: ABNORMAL MG/DL (ref 1–30)

## 2021-02-22 ENCOUNTER — TELEPHONE (OUTPATIENT)
Dept: FAMILY MEDICINE CLINIC | Age: 51
End: 2021-02-22

## 2021-02-22 NOTE — TELEPHONE ENCOUNTER
[facility] with preservices calling to see where patient should get ECHO done at, provided [phone]  To call and schedule.

## 2021-04-05 DIAGNOSIS — I10 ESSENTIAL HYPERTENSION: ICD-10-CM

## 2021-04-05 RX ORDER — LISINOPRIL 10 MG/1
10 TABLET ORAL DAILY
Qty: 30 TABLET | Refills: 2 | Status: SHIPPED | OUTPATIENT
Start: 2021-04-05 | End: 2021-05-21 | Stop reason: SDUPTHER

## 2021-04-05 NOTE — TELEPHONE ENCOUNTER
Last visit: 2/11/2021  Last Med refill:   Does patient have enough medication for 72 hours: Yes    Next Visit Date:  No future appointments. Health Maintenance   Topic Date Due    Diabetic retinal exam  Never done    COVID-19 Vaccine (1) Never done    Colon cancer screen colonoscopy  Never done    Hepatitis B vaccine (2 of 3 - Risk 3-dose series) 05/10/2019    Diabetic foot exam  01/29/2020    Shingles Vaccine (2 of 2) 02/01/2020    Annual Wellness Visit (AWV)  Never done    Flu vaccine (Season Ended) 09/01/2021    A1C test (Diabetic or Prediabetic)  11/09/2021    Diabetic microalbuminuria test  02/12/2022    Lipid screen  02/12/2022    Potassium monitoring  02/12/2022    Creatinine monitoring  02/12/2022    DTaP/Tdap/Td vaccine (2 - Td) 08/10/2028    Pneumococcal 0-64 years Vaccine  Completed    Hepatitis C screen  Completed    HIV screen  Completed    Hepatitis A vaccine  Aged Out    Hib vaccine  Aged Out    Meningococcal (ACWY) vaccine  Aged Out       Hemoglobin A1C (%)   Date Value   01/29/2019 11.6 (H)   11/21/2018 12.5   08/10/2018 11.8             ( goal A1C is < 7)   Microalb/Crt.  Ratio (mcg/mg creat)   Date Value   02/12/2021 32 (H)     LDL Cholesterol (mg/dL)   Date Value   02/12/2021 46   02/12/2021 46       (goal LDL is <100)   AST (U/L)   Date Value   02/12/2021 26   02/12/2021 26     ALT (U/L)   Date Value   02/12/2021 27   02/12/2021 27     BUN (mg/dL)   Date Value   02/12/2021 18   02/12/2021 18     BP Readings from Last 3 Encounters:   02/11/21 (!) 169/101   11/18/19 (!) 152/88   10/11/19 (!) 153/98          (goal 120/80)    All Future Testing planned in [facility]  Lab Frequency Next Occurrence   Basic Metabolic Panel Once 56/51/6550   Cologuard Once 02/11/2022   ECHO Complete 2D W Doppler W Color Once 02/12/2021               Patient Active Problem List:     Diabetes mellitus type 2, uncontrolled, with complications ([address]     Essential hypertension     Atypical chest pain

## 2021-04-23 RX ORDER — ATORVASTATIN CALCIUM 40 MG/1
TABLET, FILM COATED ORAL
Qty: 30 TABLET | Refills: 3 | Status: SHIPPED | OUTPATIENT
Start: 2021-04-23 | End: 2021-09-10

## 2021-04-23 NOTE — TELEPHONE ENCOUNTER
Please address the medication refill and close the encounter. If I can be of assistance, please route to the applicable pool. Thank you. Last visit: 2/11/21  Last Med refill: 10/16/2020  Does patient have enough medication for 72 hours: No:     Next Visit Date:  No future appointments. Health Maintenance   Topic Date Due    Diabetic retinal exam  Never done    COVID-19 Vaccine (1) Never done    Colon cancer screen colonoscopy  Never done    Hepatitis B vaccine (2 of 3 - Risk 3-dose series) 05/10/2019    Diabetic foot exam  01/29/2020    Shingles Vaccine (2 of 2) 02/01/2020    Annual Wellness Visit (AWV)  Never done    Flu vaccine (Season Ended) 09/01/2021    A1C test (Diabetic or Prediabetic)  11/09/2021    Diabetic microalbuminuria test  02/12/2022    Lipid screen  02/12/2022    Potassium monitoring  02/12/2022    Creatinine monitoring  02/12/2022    DTaP/Tdap/Td vaccine (2 - Td) 08/10/2028    Pneumococcal 0-64 years Vaccine  Completed    Hepatitis C screen  Completed    HIV screen  Completed    Hepatitis A vaccine  Aged Out    Hib vaccine  Aged Out    Meningococcal (ACWY) vaccine  Aged Out       Hemoglobin A1C (%)   Date Value   01/29/2019 11.6 (H)   11/21/2018 12.5   08/10/2018 11.8             ( goal A1C is < 7)   Microalb/Crt.  Ratio (mcg/mg creat)   Date Value   02/12/2021 32 (H)     LDL Cholesterol (mg/dL)   Date Value   02/12/2021 46   02/12/2021 46       (goal LDL is <100)   AST (U/L)   Date Value   02/12/2021 26   02/12/2021 26     ALT (U/L)   Date Value   02/12/2021 27   02/12/2021 27     BUN (mg/dL)   Date Value   02/12/2021 18   02/12/2021 18     BP Readings from Last 3 Encounters:   02/11/21 (!) 169/101   11/18/19 (!) 152/88   10/11/19 (!) 153/98          (goal 120/80)    All Future Testing planned in [facility]  Lab Frequency Next Occurrence   Basic Metabolic Panel Once 25/52/1778   Cologuard Once 02/11/2022   ECHO Complete 2D W Doppler W Color Once 02/12/2021 Patient Active Problem List:     Diabetes mellitus type 2, uncontrolled, with complications ([address]     Essential hypertension     Atypical chest pain     Need for prophylactic vaccination and inoculation against varicella     Allergic rhinitis

## 2021-05-11 ENCOUNTER — NURSE TRIAGE (OUTPATIENT)
Dept: OTHER | Facility: CLINIC | Age: 51
End: 2021-05-11

## 2021-05-11 NOTE — TELEPHONE ENCOUNTER
Chidi Kingston from Nurse Triage called stating patient has been having chest pain off and on x 6 months offered a same day appointment for today. Nurse states she has to talk to someone above her either a NP or Physician. Advised all our physicians our seeing patients at this time that she can wait on hold. Chidi Kingston states \" she will call someone else\".
cough)        Reports accompanying numbness in his fingers    11. PREGNANCY: \"Is there any chance you are pregnant? \" \"When was your last menstrual period? \"        n/a    Protocols used: CHEST PAIN-ADULT-[place]    Received call from Stacy Rivera at [facility] with [employer] Complaint. Brief description of triage: See above note    Triage indicates for patient to: Used 2nd level triage and spoke with Reba Dejesus NP who recommended patient get seen in office today as long as chest pain does not occur. Patient informed to go straight to ED if chest pain returns. Patient verbalized understanding. Care advice provided, patient verbalizes understanding; denies any other questions or concerns; instructed to call back for any new or worsening symptoms. Writer provided warm transfer to [place] at [facility] for appointment scheduling. Attention Provider: Thank you for allowing me to participate in the care of your patient. The patient was connected to triage in response to information provided to the [facility]. Please do not respond through this encounter as the response is not directed to a shared pool.

## 2021-05-12 ENCOUNTER — TELEPHONE (OUTPATIENT)
Dept: FAMILY MEDICINE CLINIC | Age: 51
End: 2021-05-12

## 2021-05-12 DIAGNOSIS — G62.9 NEUROPATHY: ICD-10-CM

## 2021-05-12 NOTE — TELEPHONE ENCOUNTER
Writer placed telephone call to patient in attempts to reschedule missed appointment on 05/11/21 with  Voicemail message left to call office and reschedule. Message # 2.

## 2021-05-12 NOTE — LETTER
[address]  [address]  [address] [facility] [address]  Phone: [phone]  Fax: [phone]            May 13, 2021     [address] [place] [address]      Dear César Noyola: We are sorry that you missed your appointment with Dr. Dena Parish on 05/11/21 . Your health and follow-up medical care are important to us. Please call our office as soon as possible so that we may reschedule your appointment. If you have already rescheduled your appointment, please disregard this letter.     Sincerely,        Dr. Josette Burgos

## 2021-05-13 NOTE — TELEPHONE ENCOUNTER
Writer placed telephone call to patient in attempts to reschedule missed appointment on 05/11/21 with  Voicemail message left to call office and reschedule. Message # 3.  Letter sent

## 2021-05-14 RX ORDER — GABAPENTIN 600 MG/1
TABLET ORAL
Qty: 90 TABLET | Refills: 2 | Status: SHIPPED | OUTPATIENT
Start: 2021-05-14 | End: 2021-05-21 | Stop reason: SDUPTHER

## 2021-05-15 DIAGNOSIS — R07.89 ATYPICAL CHEST PAIN: ICD-10-CM

## 2021-05-17 RX ORDER — ACETAMINOPHEN/DIPHENHYDRAMINE 500MG-25MG
TABLET ORAL
Qty: 30 TABLET | Refills: 5 | Status: SHIPPED | OUTPATIENT
Start: 2021-05-17 | End: 2021-05-21 | Stop reason: SDUPTHER

## 2021-05-17 NOTE — TELEPHONE ENCOUNTER
Escribe Request for pending medication. Last Visit Date: 2/11/21  Next Visit Date:  No future appointments. Health Maintenance   Topic Date Due    Diabetic retinal exam  Never done    COVID-19 Vaccine (1) Never done    Colon cancer screen colonoscopy  Never done    Hepatitis B vaccine (2 of 3 - Risk 3-dose series) 05/10/2019    Diabetic foot exam  01/29/2020    Shingles Vaccine (2 of 2) 02/01/2020    Annual Wellness Visit (AWV)  Never done    Flu vaccine (Season Ended) 09/01/2021    A1C test (Diabetic or Prediabetic)  11/09/2021    Diabetic microalbuminuria test  02/12/2022    Lipid screen  02/12/2022    Potassium monitoring  02/12/2022    Creatinine monitoring  02/12/2022    DTaP/Tdap/Td vaccine (2 - Td) 08/10/2028    Pneumococcal 0-64 years Vaccine (2 of 2) 01/13/2033    Hepatitis C screen  Completed    HIV screen  Completed    Hepatitis A vaccine  Aged Out    Hib vaccine  Aged Out    Meningococcal (ACWY) vaccine  Aged Out       Hemoglobin A1C (%)   Date Value   01/29/2019 11.6 (H)   11/21/2018 12.5   08/10/2018 11.8             ( goal A1C is < 7)   Microalb/Crt. Ratio (mcg/mg creat)   Date Value   02/12/2021 32 (H)     LDL Cholesterol (mg/dL)   Date Value   02/12/2021 46   02/12/2021 46       (goal LDL is <100)   AST (U/L)   Date Value   02/12/2021 26   02/12/2021 26     ALT (U/L)   Date Value   02/12/2021 27   02/12/2021 27     BUN (mg/dL)   Date Value   02/12/2021 18   02/12/2021 18     BP Readings from Last 3 Encounters:   02/11/21 (!) 169/101   11/18/19 (!) 152/88   10/11/19 (!) 153/98          (goal 120/80)    All Future Testing planned in [facility]  Lab Frequency Next Occurrence   Basic Metabolic Panel Once 08/57/4058   Cologuard Once 02/11/2022   ECHO Complete 2D W Doppler W Color Once 02/12/2021       Next Visit Date:  No future appointments.       Patient Active Problem List:     Diabetes mellitus type 2, uncontrolled, with complications ([address]     Essential hypertension Atypical chest pain     Need for prophylactic vaccination and inoculation against varicella     Allergic rhinitis

## 2021-05-21 ENCOUNTER — OFFICE VISIT (OUTPATIENT)
Dept: FAMILY MEDICINE CLINIC | Age: 51
End: 2021-05-21
Payer: MEDICARE

## 2021-05-21 ENCOUNTER — NURSE TRIAGE (OUTPATIENT)
Dept: OTHER | Facility: CLINIC | Age: 51
End: 2021-05-21

## 2021-05-21 VITALS
HEART RATE: 95 BPM | BODY MASS INDEX: 35.63 KG/M2 | WEIGHT: 270 LBS | DIASTOLIC BLOOD PRESSURE: 91 MMHG | SYSTOLIC BLOOD PRESSURE: 154 MMHG

## 2021-05-21 DIAGNOSIS — R07.89 ATYPICAL CHEST PAIN: ICD-10-CM

## 2021-05-21 DIAGNOSIS — N52.9 ERECTILE DYSFUNCTION, UNSPECIFIED ERECTILE DYSFUNCTION TYPE: ICD-10-CM

## 2021-05-21 DIAGNOSIS — I10 ESSENTIAL HYPERTENSION: ICD-10-CM

## 2021-05-21 DIAGNOSIS — G62.9 NEUROPATHY: ICD-10-CM

## 2021-05-21 DIAGNOSIS — M79.89 RIGHT LEG SWELLING: Primary | ICD-10-CM

## 2021-05-21 PROCEDURE — 99213 OFFICE O/P EST LOW 20 MIN: CPT | Performed by: STUDENT IN AN ORGANIZED HEALTH CARE EDUCATION/TRAINING PROGRAM

## 2021-05-21 RX ORDER — SILDENAFIL 50 MG/1
50 TABLET, FILM COATED ORAL DAILY PRN
Qty: 10 TABLET | Refills: 0 | Status: SHIPPED | OUTPATIENT
Start: 2021-05-21 | End: 2021-06-24

## 2021-05-21 RX ORDER — LISINOPRIL 20 MG/1
20 TABLET ORAL DAILY
Qty: 30 TABLET | Refills: 2 | Status: SHIPPED | OUTPATIENT
Start: 2021-05-21 | End: 2021-07-01

## 2021-05-21 RX ORDER — ASPIRIN 81 MG/1
TABLET ORAL
Qty: 30 TABLET | Refills: 5 | Status: SHIPPED | OUTPATIENT
Start: 2021-05-21 | End: 2021-12-02 | Stop reason: SDUPTHER

## 2021-05-21 RX ORDER — METOPROLOL SUCCINATE 25 MG/1
TABLET, EXTENDED RELEASE ORAL
Qty: 90 TABLET | Refills: 1 | Status: SHIPPED | OUTPATIENT
Start: 2021-05-21 | End: 2021-10-14 | Stop reason: SDUPTHER

## 2021-05-21 RX ORDER — GABAPENTIN 600 MG/1
600 TABLET ORAL 3 TIMES DAILY
Qty: 90 TABLET | Refills: 2 | Status: SHIPPED | OUTPATIENT
Start: 2021-05-21 | End: 2021-08-19 | Stop reason: SDUPTHER

## 2021-05-21 ASSESSMENT — ENCOUNTER SYMPTOMS
COUGH: 0
BACK PAIN: 0
CHEST TIGHTNESS: 0
NAUSEA: 0
WHEEZING: 0
SHORTNESS OF BREATH: 0
ABDOMINAL PAIN: 0
ABDOMINAL DISTENTION: 0
DIARRHEA: 0
VOMITING: 0

## 2021-05-21 NOTE — PROGRESS NOTES
Visit Information    Have you changed or started any medications since your last visit including any over-the-counter medicines, vitamins, or herbal medicines? no   Are you having any side effects from any of your medications? -  no  Have you stopped taking any of your medications? Is so, why? -  no    Have you seen any other physician or provider since your last visit? No  Have you had any other diagnostic tests since your last visit? No  Have you been seen in the emergency room and/or had an admission to a hospital since we last saw you? No  Have you had your routine dental cleaning in the past 6 months? no    Have you activated your [employer] account? If not, what are your barriers?  No:      Patient Care Team:  Stephanie Hancock MD as PCP - General (Family Medicine)    Medical History Review  Past Medical, Family, and Social History reviewed and does contribute to the patient presenting condition    Health Maintenance   Topic Date Due    Diabetic retinal exam  Never done    COVID-19 Vaccine (1) Never done    Colon cancer screen colonoscopy  Never done    Hepatitis B vaccine (2 of 3 - Risk 3-dose series) 05/10/2019    Diabetic foot exam  01/29/2020    Shingles Vaccine (2 of 2) 02/01/2020    Annual Wellness Visit (AWV)  Never done    Flu vaccine (Season Ended) 09/01/2021    A1C test (Diabetic or Prediabetic)  11/09/2021    Diabetic microalbuminuria test  02/12/2022    Lipid screen  02/12/2022    Potassium monitoring  02/12/2022    Creatinine monitoring  02/12/2022    DTaP/Tdap/Td vaccine (2 - Td) 08/10/2028    Pneumococcal 0-64 years Vaccine (2 of 2) 01/13/2033    Hepatitis C screen  Completed    HIV screen  Completed    Hepatitis A vaccine  Aged Out    Hib vaccine  Aged Out    Meningococcal (ACWY) vaccine  Aged Out

## 2021-05-21 NOTE — PATIENT INSTRUCTIONS
Thank you for letting us take care of you today. We hope all your questions were addressed. If a question was overlooked or something else comes to mind after you return home, please contact a member of your Care Team listed below. Your Care Team at [place] [address] is Team #4  Brittani Locke MD (Faculty)  Kiersten Cornelius MD (Resident)  Lennox Reno, MD (Resident)  Juan M Contreras MD (Resident)  Boo Roth MD (Resident)  [facility])  Tiffany López, [address] [facility] [address] (Clinical Practice Manager)  Jaye Montgomery [facility] (Clinical Pharmacist)       Office phone number: [phone]    If you need to get in right away due to illness, please be advised we have \"Same Day\" appointments available Monday-Friday. Please call us at [phone] option #3 to schedule your \"Same Day\" appointment.

## 2021-05-21 NOTE — PROGRESS NOTES
Abdullahi Carrion (:  1970) is a 46 y.o. male,Established patient, here for evaluation of the following chief complaint(s):  Swelling (rt leg/foot swelling for a week) and Chest Pain (flaring back up today)       ASSESSMENT/PLAN:  1. Right leg swelling  -     US DUP LOWER EXTREMITY RIGHT MANNY; Future  2. Essential hypertension  -     lisinopril (PRINIVIL;ZESTRIL) 20 MG tablet; Take 1 tablet by mouth daily, Disp-30 tablet, R-2Normal  -     metoprolol succinate (TOPROL XL) 25 MG extended release tablet; Take one tablet once a day, Disp-90 tablet, R-1Normal  3. Erectile dysfunction, unspecified erectile dysfunction type  -     sildenafil (VIAGRA) 50 MG tablet; Take 1 tablet by mouth daily as needed for Erectile Dysfunction, Disp-10 tablet, R-0Normal  4. Diabetes mellitus type 2, uncontrolled, with complications (HCC)  -     aspirin (RA ASPIRIN EC) 81 MG EC tablet; take 1 tablet by mouth once daily, Disp-30 tablet, R-5Normal  -     gabapentin (NEURONTIN) 600 MG tablet; Take 1 tablet by mouth 3 times daily for 30 days. , Disp-90 tablet, R-2Print  5. Atypical chest pain  -     aspirin (RA ASPIRIN EC) 81 MG EC tablet; take 1 tablet by mouth once daily, Disp-30 tablet, R-5Normal  6. Neuropathy  -     gabapentin (NEURONTIN) 600 MG tablet; Take 1 tablet by mouth 3 times daily for 30 days. , Disp-90 tablet, R-2Print     - STAT order placed for Doppler RLE, concern for DVT, educated on s/s PE, pt did teachback if any appear immediately to emergency department  - Increase Lisinopril to 20mg qd, meds refilled  - Counseled on ED, given trial Rx, but patient did teachback will hold off on using until leg swelling is addressed w/ Doppler and if positive will need anticoagulation    Return in about 4 weeks (around 2021) for f/u uncontrlled htn.        Subjective   SUBJECTIVE/OBJECTIVE:  Pt presents for sick visit - RLE edema  Reports has chronic edema of B/L legs, uses compression stockings  But R leg has been getting bigger for several days  Denies painful  Does use cane, impaired mobility especially of the RLE  Amenable to go get U/S Doppler done today  Discussed anticoagulation if positive  Denies chest pain, pleuritic pain, SOB, palpitations, hemoptysis      Review of Systems   Constitutional: Negative for activity change, appetite change, chills, fever and unexpected weight change. Respiratory: Negative for cough, chest tightness, shortness of breath and wheezing. Cardiovascular: Positive for leg swelling. Negative for chest pain and palpitations. Gastrointestinal: Negative for abdominal distention, abdominal pain, diarrhea, nausea and vomiting. Genitourinary: Negative for difficulty urinating and flank pain. Musculoskeletal: Positive for gait problem (chronic, around baseline, uses cane on R side). Negative for arthralgias and back pain. Skin: Negative for rash. Neurological: Negative for dizziness, syncope, weakness and light-headedness. Psychiatric/Behavioral: Negative for dysphoric mood. The patient is not nervous/anxious. Objective   Physical Exam  Vitals reviewed. Constitutional:       General: He is not in acute distress. Appearance: He is well-developed. He is not diaphoretic. HENT:      Head: Normocephalic and atraumatic. Cardiovascular:      Rate and Rhythm: Normal rate and regular rhythm. Heart sounds: Normal heart sounds. No murmur heard. No gallop. Pulmonary:      Effort: Pulmonary effort is normal. No respiratory distress. Breath sounds: Normal breath sounds. No wheezing or rales. Abdominal:      General: Bowel sounds are normal. There is no distension. Palpations: Abdomen is soft. Tenderness: There is no abdominal tenderness. There is no guarding. Musculoskeletal:         General: No tenderness. Normal range of motion. Right lower leg: Edema (Significantly larger than L, despite having compression stockings on both.  +2-3 up to knee, No cords

## 2021-05-21 NOTE — TELEPHONE ENCOUNTER
Received call from Sari at pre-service center [facility] with [employer] Complaint. Brief description of triage: Right leg swelling, starting in foot on Sunday. Last two days the right leg is more swollen than left leg. Denies pain in leg. Has had on and off chest pain for 2 weeks. States he spoke with his diabetes doctor about the chest pain last week and was told to follow up with PCP. Triage indicates for patient to go to [address] [place] [address] or to office with PCP approval.    [facility] office but no answer. 2nd level triage completed with Jorge Patel NP; provider recommends patient be seen today in office. Advised patient to go to ED if unable to get appointment. Care advice provided, patient verbalizes understanding; denies any other questions or concerns; instructed to call back for any new or worsening symptoms. Writer provided warm transfer to Amaris Shepard at [facility] for appointment scheduling. Attention Provider: Thank you for allowing me to participate in the care of your patient. The patient was connected to triage in response to information provided to the [facility]. Please do not respond through this encounter as the response is not directed to a shared pool. Reason for Disposition   Thigh, calf, or ankle swelling in only one leg    Answer Assessment - Initial Assessment Questions  1. ONSET: \"When did the swelling start? \" (e.g., minutes, hours, days)      Sunday    2. LOCATION: \"What part of the leg is swollen? \"  \"Are both legs swollen or just one leg? \"      Right lower leg and foot    3. SEVERITY: \"How bad is the swelling? \" (e.g., localized; mild, moderate, severe)   - Localized - small area of swelling localized to one leg   - MILD pedal edema - swelling limited to foot and ankle, pitting edema < 1/4 inch (6 mm) deep, rest and elevation eliminate most or all swelling   - MODERATE edema - swelling of lower leg to knee, pitting edema > 1/4 inch (6 mm) deep, rest and elevation

## 2021-05-21 NOTE — PROGRESS NOTES
Attending Physician Statement  I  have discussed the care of Selvin Hines including pertinent history and exam findings with the resident. I agree with the assessment, plan and orders as documented by the resident. BP (!) 154/91   Pulse 95   Wt 270 lb (122.5 kg)   BMI 35.63 kg/m²    BP Readings from Last 3 Encounters:   05/21/21 (!) 154/91   02/11/21 (!) 169/101   11/18/19 (!) 152/88     Wt Readings from Last 3 Encounters:   05/21/21 270 lb (122.5 kg)   02/11/21 271 lb 3.2 oz (123 kg)   11/18/19 270 lb 3.2 oz (122.6 kg)          Diagnosis Orders   1. Right leg swelling  US DUP LOWER EXTREMITY RIGHT MANNY   2. Essential hypertension  lisinopril (PRINIVIL;ZESTRIL) 20 MG tablet    metoprolol succinate (TOPROL XL) 25 MG extended release tablet   3. Erectile dysfunction, unspecified erectile dysfunction type  sildenafil (VIAGRA) 50 MG tablet   4. Diabetes mellitus type 2, uncontrolled, with complications (HCC)  aspirin (RA ASPIRIN EC) 81 MG EC tablet    gabapentin (NEURONTIN) 600 MG tablet   5. Atypical chest pain  aspirin (RA ASPIRIN EC) 81 MG EC tablet   6.  Neuropathy  gabapentin (NEURONTIN) 600 MG tablet           William Burgess DO 5/21/2021 4:26 PM

## 2021-05-25 ENCOUNTER — TELEPHONE (OUTPATIENT)
Dept: FAMILY MEDICINE CLINIC | Age: 51
End: 2021-05-25

## 2021-06-24 DIAGNOSIS — N52.9 ERECTILE DYSFUNCTION, UNSPECIFIED ERECTILE DYSFUNCTION TYPE: ICD-10-CM

## 2021-06-24 RX ORDER — SILDENAFIL 50 MG/1
TABLET, FILM COATED ORAL
Qty: 10 TABLET | Refills: 0 | Status: SHIPPED | OUTPATIENT
Start: 2021-06-24 | End: 2021-07-01

## 2021-07-01 ENCOUNTER — OFFICE VISIT (OUTPATIENT)
Dept: FAMILY MEDICINE CLINIC | Age: 51
End: 2021-07-01
Payer: MEDICARE

## 2021-07-01 DIAGNOSIS — R10.13 DYSPEPSIA: ICD-10-CM

## 2021-07-01 DIAGNOSIS — I10 ESSENTIAL HYPERTENSION: Primary | ICD-10-CM

## 2021-07-01 DIAGNOSIS — N52.9 ERECTILE DYSFUNCTION, UNSPECIFIED ERECTILE DYSFUNCTION TYPE: ICD-10-CM

## 2021-07-01 PROCEDURE — 99213 OFFICE O/P EST LOW 20 MIN: CPT | Performed by: STUDENT IN AN ORGANIZED HEALTH CARE EDUCATION/TRAINING PROGRAM

## 2021-07-01 RX ORDER — LISINOPRIL 40 MG/1
40 TABLET ORAL DAILY
Qty: 30 TABLET | Refills: 1 | Status: SHIPPED | OUTPATIENT
Start: 2021-07-01 | End: 2021-08-17

## 2021-07-01 RX ORDER — FAMOTIDINE 40 MG/1
20 TABLET, FILM COATED ORAL EVERY EVENING
Qty: 30 TABLET | Refills: 0 | Status: SHIPPED | OUTPATIENT
Start: 2021-07-01 | End: 2021-08-17

## 2021-07-01 RX ORDER — TADALAFIL 10 MG/1
10 TABLET ORAL PRN
Qty: 30 TABLET | Refills: 3 | Status: SHIPPED | OUTPATIENT
Start: 2021-07-01 | End: 2021-10-14

## 2021-07-01 ASSESSMENT — ENCOUNTER SYMPTOMS
CHEST TIGHTNESS: 0
SHORTNESS OF BREATH: 0
ABDOMINAL PAIN: 0

## 2021-07-01 NOTE — PROGRESS NOTES
Visit Information    Have you changed or started any medications since your last visit including any over-the-counter medicines, vitamins, or herbal medicines? no   Are you having any side effects from any of your medications? -  no  Have you stopped taking any of your medications? Is so, why? -  no    Have you seen any other physician or provider since your last visit? No  Have you had any other diagnostic tests since your last visit? No  Have you been seen in the emergency room and/or had an admission to a hospital since we last saw you? No  Have you had your routine dental cleaning in the past 6 months? no    Have you activated your [employer] account? If not, what are your barriers?  Yes     Patient Care Team:  Moise Bacon MD as PCP - General (Family Medicine)    Medical History Review  Past Medical, Family, and Social History reviewed and does not contribute to the patient presenting condition    Health Maintenance   Topic Date Due    Diabetic retinal exam  Never done    COVID-19 Vaccine (1) Never done    Colon cancer screen colonoscopy  Never done    Hepatitis B vaccine (2 of 3 - Risk 3-dose series) 05/10/2019    Diabetic foot exam  01/29/2020    Shingles Vaccine (2 of 2) 02/01/2020    Annual Wellness Visit (AWV)  Never done    Flu vaccine (1) 09/01/2021    A1C test (Diabetic or Prediabetic)  11/09/2021    Diabetic microalbuminuria test  02/12/2022    Lipid screen  02/12/2022    Potassium monitoring  02/12/2022    Creatinine monitoring  02/12/2022    DTaP/Tdap/Td vaccine (2 - Td or Tdap) 08/10/2028    Pneumococcal 0-64 years Vaccine (2 of 2 - PPSV23) 01/13/2033    Hepatitis C screen  Completed    HIV screen  Completed    Hepatitis A vaccine  Aged Out    Hib vaccine  Aged Out    Meningococcal (ACWY) vaccine  Aged Out

## 2021-07-01 NOTE — PROGRESS NOTES
I have reviewed and discussed beasley elements of [address] [place] [address] with the resident including plan of care and follow up and agree with the care federico plan.

## 2021-07-01 NOTE — PROGRESS NOTES
Subjective:    Shahzad Robert is a 46 y.o. male with  has a past medical history of Cervical disc disease and Diabetes mellitus ([address] Family History   Problem Relation Age of Onset    Diabetes Mother     Diabetes Father     Hypertension Father     Stroke Sister     Other Sister         HIP REPLACEMENT       Presented tothe office today for:  Chief Complaint   Patient presents with    Hypertension    Skin Problem     right heel has been peeling/ no pain       HPI  49-year-old male presenting for hypertension follow-up  Currently on 20 mg of lisinopril  Blood pressure today 161/91  Asymptomatic; denies any headache, visual changes, chest pain, shortness of breath    ED  Has tried Viagra in the past would like to try something else  Discussed proper usage of Cialis    MSK versus dyspepsia  Intermittent epigastric discomfort  Occurs when lying flat in certain positions    Review of Systems   Constitutional: Negative for chills, fatigue and fever. Eyes: Negative for visual disturbance. Respiratory: Negative for chest tightness and shortness of breath. Cardiovascular: Negative for chest pain and leg swelling. Gastrointestinal: Negative for abdominal pain. Neurological: Negative for dizziness, light-headedness and headaches. Objective:    BP (!) 161/91 (Site: Right Upper Arm, Position: Sitting, Cuff Size: Medium Adult)   Pulse 90   Ht 6' (1.829 m)   Wt 264 lb 3.2 oz (119.8 kg)   BMI 35.83 kg/m²    BP Readings from Last 3 Encounters:   07/01/21 (!) 161/91   05/21/21 (!) 154/91   02/11/21 (!) 169/101     Physical Exam  Vitals and nursing note reviewed. Constitutional:       Appearance: Normal appearance. He is well-developed. HENT:      Head: Normocephalic and atraumatic. Cardiovascular:      Rate and Rhythm: Normal rate and regular rhythm. Heart sounds: Normal heart sounds. Pulmonary:      Effort: Pulmonary effort is normal. No respiratory distress.       Breath sounds: Normal breath sounds. No wheezing. Skin:     General: Skin is warm and dry. Neurological:      Mental Status: He is alert. Lab Results   Component Value Date    WBC 5.0 06/06/2018    HGB 14.0 06/06/2018    HCT 43.2 06/06/2018     06/06/2018    CHOL 95 02/12/2021    CHOL 95 02/12/2021    TRIG 67 02/12/2021    TRIG 67 02/12/2021    HDL 36 (L) 02/12/2021    HDL 36 (L) 02/12/2021    ALT 27 02/12/2021    ALT 27 02/12/2021    AST 26 02/12/2021    AST 26 02/12/2021     02/12/2021     02/12/2021    K 4.5 02/12/2021    K 4.5 02/12/2021     02/12/2021     02/12/2021    CREATININE 0.73 02/12/2021    CREATININE 0.73 02/12/2021    BUN 18 02/12/2021    BUN 18 02/12/2021    CO2 31 02/12/2021    CO2 31 02/12/2021    LABA1C 11.6 (H) 01/29/2019    LABMICR 32 (H) 02/12/2021     Lab Results   Component Value Date    CALCIUM 9.5 02/12/2021    CALCIUM 9.5 02/12/2021     Lab Results   Component Value Date    LDLCHOLESTEROL 46 02/12/2021    LDLCHOLESTEROL 46 02/12/2021       Assessment and Plan:    1. Essential hypertension  Increase lisinopril to 40 mg  - lisinopril (PRINIVIL;ZESTRIL) 40 MG tablet; Take 1 tablet by mouth daily  Dispense: 30 tablet; Refill: 1    2. Dyspepsia  - famotidine (PEPCID) 40 MG tablet; Take 0.5 tablets by mouth every evening  Dispense: 30 tablet; Refill: 0    3. Erectile dysfunction, unspecified erectile dysfunction type  - tadalafil (CIALIS) 10 MG tablet; Take 1 tablet by mouth as needed for Erectile Dysfunction  Dispense: 30 tablet;  Refill: 3          Requested Prescriptions     Signed Prescriptions Disp Refills    lisinopril (PRINIVIL;ZESTRIL) 40 MG tablet 30 tablet 1     Sig: Take 1 tablet by mouth daily    famotidine (PEPCID) 40 MG tablet 30 tablet 0     Sig: Take 0.5 tablets by mouth every evening    tadalafil (CIALIS) 10 MG tablet 30 tablet 3     Sig: Take 1 tablet by mouth as needed for Erectile Dysfunction       Medications Discontinued During This Encounter

## 2021-07-06 VITALS
DIASTOLIC BLOOD PRESSURE: 90 MMHG | BODY MASS INDEX: 35.78 KG/M2 | HEART RATE: 90 BPM | SYSTOLIC BLOOD PRESSURE: 160 MMHG | HEIGHT: 72 IN | WEIGHT: 264.2 LBS

## 2021-07-29 ENCOUNTER — OFFICE VISIT (OUTPATIENT)
Dept: FAMILY MEDICINE CLINIC | Age: 51
End: 2021-07-29
Payer: MEDICARE

## 2021-07-29 VITALS
BODY MASS INDEX: 36.1 KG/M2 | WEIGHT: 266.2 LBS | HEART RATE: 85 BPM | DIASTOLIC BLOOD PRESSURE: 89 MMHG | SYSTOLIC BLOOD PRESSURE: 138 MMHG

## 2021-07-29 DIAGNOSIS — I10 ESSENTIAL HYPERTENSION: Primary | ICD-10-CM

## 2021-07-29 PROCEDURE — 99213 OFFICE O/P EST LOW 20 MIN: CPT | Performed by: STUDENT IN AN ORGANIZED HEALTH CARE EDUCATION/TRAINING PROGRAM

## 2021-07-29 ASSESSMENT — ENCOUNTER SYMPTOMS
ABDOMINAL PAIN: 0
SHORTNESS OF BREATH: 0
CHEST TIGHTNESS: 0

## 2021-07-29 NOTE — PROGRESS NOTES
Subjective:    Gabriela Urbina is a 46 y.o. male with  has a past medical history of Cervical disc disease and Diabetes mellitus ([address] Family History   Problem Relation Age of Onset    Diabetes Mother     Diabetes Father     Hypertension Father     Stroke Sister     Other Sister         HIP REPLACEMENT       Presented tot office today for:  Chief Complaint   Patient presents with    Follow-up     feet swelling not as bad       HPI  HTN  BP controlled;   BP Readings from Last 1 Encounters:   07/29/21 138/89     Medication: lisinopril 40 mg daily, metoprolol succinate 25 mg daily  Compliance: Yes  Denies any HA, VC, CP, SOB, abd pain  Monitor(s) home blood pressure daily    Review of Systems   Constitutional: Negative for chills, fatigue and fever. Eyes: Negative for visual disturbance. Respiratory: Negative for chest tightness and shortness of breath. Cardiovascular: Negative for chest pain and leg swelling. Gastrointestinal: Negative for abdominal pain. Neurological: Negative for dizziness, light-headedness and headaches. Objective:    /89 (Site: Left Upper Arm, Position: Sitting, Cuff Size: Medium Adult)   Pulse 85   Wt 266 lb 3.2 oz (120.7 kg)   BMI 36.10 kg/m²    BP Readings from Last 3 Encounters:   07/29/21 138/89   07/06/21 (!) 160/90   05/21/21 (!) 154/91     Physical Exam  Vitals and nursing note reviewed. Constitutional:       Appearance: Normal appearance. He is well-developed. HENT:      Head: Normocephalic and atraumatic. Cardiovascular:      Rate and Rhythm: Normal rate and regular rhythm. Heart sounds: Normal heart sounds. Pulmonary:      Effort: Pulmonary effort is normal. No respiratory distress. Breath sounds: Normal breath sounds. No wheezing. Skin:     General: Skin is warm and dry.        Lab Results   Component Value Date    WBC 5.0 06/06/2018    HGB 14.0 06/06/2018    HCT 43.2 06/06/2018     06/06/2018    CHOL 95 02/12/2021

## 2021-07-29 NOTE — PROGRESS NOTES
Visit Information    Have you changed or started any medications since your last visit including any over-the-counter medicines, vitamins, or herbal medicines? no   Are you having any side effects from any of your medications? -  no  Have you stopped taking any of your medications? Is so, why? -  no    Have you seen any other physician or provider since your last visit? No  Have you had any other diagnostic tests since your last visit? No  Have you been seen in the emergency room and/or had an admission to a hospital since we last saw you? No  Have you had your routine dental cleaning in the past 6 months? no    Have you activated your [employer] account? If not, what are your barriers?  Yes     Patient Care Team:  Zeinab Greene MD as PCP - General (Family Medicine)    Medical History Review  Past Medical, Family, and Social History reviewed and does not contribute to the patient presenting condition    Health Maintenance   Topic Date Due    Diabetic retinal exam  Never done    COVID-19 Vaccine (1) Never done    Colon cancer screen colonoscopy  Never done    Hepatitis B vaccine (2 of 3 - Risk 3-dose series) 05/10/2019    Diabetic foot exam  01/29/2020    Shingles Vaccine (2 of 2) 02/01/2020    Annual Wellness Visit (AWV)  Never done    Flu vaccine (1) 09/01/2021    A1C test (Diabetic or Prediabetic)  11/09/2021    Diabetic microalbuminuria test  02/12/2022    Lipid screen  02/12/2022    Potassium monitoring  02/12/2022    Creatinine monitoring  02/12/2022    DTaP/Tdap/Td vaccine (2 - Td or Tdap) 08/10/2028    Pneumococcal 0-64 years Vaccine (2 of 2 - PPSV23) 01/13/2033    Hepatitis C screen  Completed    HIV screen  Completed    Hepatitis A vaccine  Aged Out    Hib vaccine  Aged Out    Meningococcal (ACWY) vaccine  Aged Out

## 2021-07-29 NOTE — PROGRESS NOTES
Attending Physician Statement    Wt Readings from Last 3 Encounters:   07/29/21 266 lb 3.2 oz (120.7 kg)   07/01/21 264 lb 3.2 oz (119.8 kg)   05/21/21 270 lb (122.5 kg)     Temp Readings from Last 3 Encounters:   02/11/21 97.1 °F (36.2 °C) (Temporal)   11/18/19 97.4 °F (36.3 °C) (Temporal)   10/11/19 97.2 °F (36.2 °C) (Temporal)     BP Readings from Last 3 Encounters:   07/29/21 138/89   07/06/21 (!) 160/90   05/21/21 (!) 154/91     Pulse Readings from Last 3 Encounters:   07/29/21 85   07/01/21 90   05/21/21 95         I have discussed the care of Carolyn Escobar, including pertinent history and exam findings with the resident. I have reviewed the key elements of all parts of the encounter with the resident. I agree with the assessment, plan and orders as documented by the resident.   (GE Modifier)

## 2021-08-17 DIAGNOSIS — I10 ESSENTIAL HYPERTENSION: ICD-10-CM

## 2021-08-17 DIAGNOSIS — R10.13 DYSPEPSIA: ICD-10-CM

## 2021-08-17 RX ORDER — LISINOPRIL 40 MG/1
TABLET ORAL
Qty: 30 TABLET | Refills: 1 | Status: SHIPPED | OUTPATIENT
Start: 2021-08-17 | End: 2021-09-07 | Stop reason: SDUPTHER

## 2021-08-17 RX ORDER — FAMOTIDINE 40 MG/1
20 TABLET, FILM COATED ORAL EVERY EVENING
Qty: 30 TABLET | Refills: 0 | Status: SHIPPED | OUTPATIENT
Start: 2021-08-17 | End: 2021-08-26

## 2021-08-17 NOTE — TELEPHONE ENCOUNTER
Please address the medication refill and close the encounter. If I can be of assistance, please route to the applicable pool. Thank you. Last visit: 7-29-21  Last Med refill: 7-1-21 famotidine    7-1-21 lisionpril   Does patient have enough medication for 72 hours: No:     Next Visit Date:  No future appointments. Health Maintenance   Topic Date Due    Diabetic retinal exam  Never done    COVID-19 Vaccine (1) Never done    Colon cancer screen colonoscopy  Never done    Hepatitis B vaccine (2 of 3 - Risk 3-dose series) 05/10/2019    Diabetic foot exam  01/29/2020    Shingles Vaccine (2 of 2) 02/01/2020    Annual Wellness Visit (AWV)  Never done    Flu vaccine (1) 09/01/2021    A1C test (Diabetic or Prediabetic)  11/09/2021    Diabetic microalbuminuria test  02/12/2022    Lipid screen  02/12/2022    Potassium monitoring  02/12/2022    Creatinine monitoring  02/12/2022    DTaP/Tdap/Td vaccine (2 - Td or Tdap) 08/10/2028    Pneumococcal 0-64 years Vaccine (2 of 2 - PPSV23) 01/13/2033    Hepatitis C screen  Completed    HIV screen  Completed    Hepatitis A vaccine  Aged Out    Hib vaccine  Aged Out    Meningococcal (ACWY) vaccine  Aged Out       Hemoglobin A1C (%)   Date Value   01/29/2019 11.6 (H)   11/21/2018 12.5   08/10/2018 11.8             ( goal A1C is < 7)   Microalb/Crt.  Ratio (mcg/mg creat)   Date Value   02/12/2021 32 (H)     LDL Cholesterol (mg/dL)   Date Value   02/12/2021 46   02/12/2021 46       (goal LDL is <100)   AST (U/L)   Date Value   02/12/2021 26   02/12/2021 26     ALT (U/L)   Date Value   02/12/2021 27   02/12/2021 27     BUN (mg/dL)   Date Value   02/12/2021 18   02/12/2021 18     BP Readings from Last 3 Encounters:   07/29/21 138/89   07/06/21 (!) 160/90   05/21/21 (!) 154/91          (goal 120/80)    All Future Testing planned in [facility]  Lab Frequency Next Occurrence   Cologuard Once 02/11/2022   ECHO Complete 2D W Doppler W Color Once 02/12/2021 Patient Active Problem List:     Diabetes mellitus type 2, uncontrolled, with complications ([address]     Essential hypertension     Atypical chest pain     Need for prophylactic vaccination and inoculation against varicella     Allergic rhinitis

## 2021-08-19 DIAGNOSIS — G62.9 NEUROPATHY: ICD-10-CM

## 2021-08-19 RX ORDER — GABAPENTIN 600 MG/1
600 TABLET ORAL 3 TIMES DAILY
Qty: 90 TABLET | Refills: 0 | Status: SHIPPED | OUTPATIENT
Start: 2021-08-19 | End: 2021-09-20

## 2021-08-19 NOTE — TELEPHONE ENCOUNTER
Please address the medication refill and close the encounter. If I can be of assistance, please route to the applicable pool. Thank you. Last visit: 7-29-21  Last Med refill: 6-  Does patient have enough medication for 72 hours: No:     Next Visit Date:  No future appointments. Health Maintenance   Topic Date Due    Diabetic retinal exam  Never done    COVID-19 Vaccine (1) Never done    Colon cancer screen colonoscopy  Never done    Hepatitis B vaccine (2 of 3 - Risk 3-dose series) 05/10/2019    Diabetic foot exam  01/29/2020    Shingles Vaccine (2 of 2) 02/01/2020    Annual Wellness Visit (AWV)  Never done    Flu vaccine (1) 09/01/2021    A1C test (Diabetic or Prediabetic)  11/09/2021    Diabetic microalbuminuria test  02/12/2022    Lipid screen  02/12/2022    Potassium monitoring  02/12/2022    Creatinine monitoring  02/12/2022    DTaP/Tdap/Td vaccine (2 - Td or Tdap) 08/10/2028    Pneumococcal 0-64 years Vaccine (2 of 2 - PPSV23) 01/13/2033    Hepatitis C screen  Completed    HIV screen  Completed    Hepatitis A vaccine  Aged Out    Hib vaccine  Aged Out    Meningococcal (ACWY) vaccine  Aged Out       Hemoglobin A1C (%)   Date Value   01/29/2019 11.6 (H)   11/21/2018 12.5   08/10/2018 11.8             ( goal A1C is < 7)   Microalb/Crt.  Ratio (mcg/mg creat)   Date Value   02/12/2021 32 (H)     LDL Cholesterol (mg/dL)   Date Value   02/12/2021 46   02/12/2021 46       (goal LDL is <100)   AST (U/L)   Date Value   02/12/2021 26   02/12/2021 26     ALT (U/L)   Date Value   02/12/2021 27   02/12/2021 27     BUN (mg/dL)   Date Value   02/12/2021 18   02/12/2021 18     BP Readings from Last 3 Encounters:   07/29/21 138/89   07/06/21 (!) 160/90   05/21/21 (!) 154/91          (goal 120/80)    All Future Testing planned in [facility]  Lab Frequency Next Occurrence   Cologuard Once 02/11/2022   ECHO Complete 2D W Doppler W Color Once 02/12/2021               Patient Active Problem List:     Diabetes mellitus type 2, uncontrolled, with complications ([facility] [address]     Essential hypertension     Atypical chest pain     Need for prophylactic vaccination and inoculation against varicella     Allergic rhinitis

## 2021-08-26 DIAGNOSIS — R10.13 DYSPEPSIA: ICD-10-CM

## 2021-08-26 RX ORDER — FAMOTIDINE 40 MG/1
20 TABLET, FILM COATED ORAL EVERY EVENING
Qty: 30 TABLET | Refills: 2 | Status: SHIPPED | OUTPATIENT
Start: 2021-08-26 | End: 2021-10-14 | Stop reason: SDUPTHER

## 2021-08-26 NOTE — TELEPHONE ENCOUNTER
Please address the medication refill and close the encounter. If I can be of assistance, please route to the applicable pool. Thank you. Last visit: 7-  Last Med refill: 7-  Does patient have enough medication for 72 hours: No:     Next Visit Date:  No future appointments. Health Maintenance   Topic Date Due    Diabetic retinal exam  Never done    COVID-19 Vaccine (1) Never done    Colon cancer screen colonoscopy  Never done    Hepatitis B vaccine (2 of 3 - Risk 3-dose series) 05/10/2019    Diabetic foot exam  01/29/2020    Shingles Vaccine (2 of 2) 02/01/2020    Annual Wellness Visit (AWV)  Never done    Flu vaccine (1) 09/01/2021    A1C test (Diabetic or Prediabetic)  11/09/2021    Diabetic microalbuminuria test  02/12/2022    Lipid screen  02/12/2022    Potassium monitoring  02/12/2022    Creatinine monitoring  02/12/2022    DTaP/Tdap/Td vaccine (2 - Td or Tdap) 08/10/2028    Pneumococcal 0-64 years Vaccine (2 of 2 - PPSV23) 01/13/2033    Hepatitis C screen  Completed    HIV screen  Completed    Hepatitis A vaccine  Aged Out    Hib vaccine  Aged Out    Meningococcal (ACWY) vaccine  Aged Out       Hemoglobin A1C (%)   Date Value   01/29/2019 11.6 (H)   11/21/2018 12.5   08/10/2018 11.8             ( goal A1C is < 7)   Microalb/Crt.  Ratio (mcg/mg creat)   Date Value   02/12/2021 32 (H)     LDL Cholesterol (mg/dL)   Date Value   02/12/2021 46   02/12/2021 46       (goal LDL is <100)   AST (U/L)   Date Value   02/12/2021 26   02/12/2021 26     ALT (U/L)   Date Value   02/12/2021 27   02/12/2021 27     BUN (mg/dL)   Date Value   02/12/2021 18   02/12/2021 18     BP Readings from Last 3 Encounters:   07/29/21 138/89   07/06/21 (!) 160/90   05/21/21 (!) 154/91          (goal 120/80)    All Future Testing planned in [facility]  Lab Frequency Next Occurrence   Cologuard Once 02/11/2022   ECHO Complete 2D W Doppler W Color Once 02/12/2021               Patient Active Problem List:     Diabetes mellitus type 2, uncontrolled, with complications ([address]     Essential hypertension     Atypical chest pain     Need for prophylactic vaccination and inoculation against varicella     Allergic rhinitis

## 2021-09-07 DIAGNOSIS — I10 ESSENTIAL HYPERTENSION: ICD-10-CM

## 2021-09-07 RX ORDER — LISINOPRIL 40 MG/1
TABLET ORAL
Qty: 30 TABLET | Refills: 5 | Status: SHIPPED | OUTPATIENT
Start: 2021-09-07 | End: 2021-10-14 | Stop reason: SDUPTHER

## 2021-09-07 NOTE — TELEPHONE ENCOUNTER
Last visit: 07/29/21  Last Med refill: 08/17/21  Does patient have enough medication for 72 hours: Yes    Next Visit Date:  No future appointments. Health Maintenance   Topic Date Due    Diabetic retinal exam  Never done    COVID-19 Vaccine (1) Never done    Colon cancer screen colonoscopy  Never done    Hepatitis B vaccine (2 of 3 - Risk 3-dose series) 05/10/2019    Diabetic foot exam  01/29/2020    Shingles Vaccine (2 of 2) 02/01/2020    Annual Wellness Visit (AWV)  Never done    Flu vaccine (1) 09/01/2021    A1C test (Diabetic or Prediabetic)  11/09/2021    Diabetic microalbuminuria test  02/12/2022    Lipid screen  02/12/2022    Potassium monitoring  02/12/2022    Creatinine monitoring  02/12/2022    DTaP/Tdap/Td vaccine (2 - Td or Tdap) 08/10/2028    Pneumococcal 0-64 years Vaccine (2 of 2 - PPSV23) 01/13/2033    Hepatitis C screen  Completed    HIV screen  Completed    Hepatitis A vaccine  Aged Out    Hib vaccine  Aged Out    Meningococcal (ACWY) vaccine  Aged Out       Hemoglobin A1C (%)   Date Value   01/29/2019 11.6 (H)   11/21/2018 12.5   08/10/2018 11.8             ( goal A1C is < 7)   Microalb/Crt.  Ratio (mcg/mg creat)   Date Value   02/12/2021 32 (H)     LDL Cholesterol (mg/dL)   Date Value   02/12/2021 46   02/12/2021 46       (goal LDL is <100)   AST (U/L)   Date Value   02/12/2021 26   02/12/2021 26     ALT (U/L)   Date Value   02/12/2021 27   02/12/2021 27     BUN (mg/dL)   Date Value   02/12/2021 18   02/12/2021 18     BP Readings from Last 3 Encounters:   07/29/21 138/89   07/06/21 (!) 160/90   05/21/21 (!) 154/91          (goal 120/80)    All Future Testing planned in [facility]  Lab Frequency Next Occurrence   Cologuard Once 02/11/2022   ECHO Complete 2D W Doppler W Color Once 02/12/2021               Patient Active Problem List:     Diabetes mellitus type 2, uncontrolled, with complications ([address]     Essential hypertension     Atypical chest pain     Need for prophylactic vaccination and inoculation against varicella     Allergic rhinitis

## 2021-09-10 RX ORDER — ATORVASTATIN CALCIUM 40 MG/1
TABLET, FILM COATED ORAL
Qty: 30 TABLET | Refills: 3 | Status: SHIPPED | OUTPATIENT
Start: 2021-09-10 | End: 2021-12-21

## 2021-09-10 NOTE — TELEPHONE ENCOUNTER
E-scribe request for atorvastatin. Please review and e-scribe if applicable. Last Visit Date:  07/29/2021  Next Visit Date:  Visit date not found    Hemoglobin A1C (%)   Date Value   01/29/2019 11.6 (H)   11/21/2018 12.5   08/10/2018 11.8             ( goal A1C is < 7)   Microalb/Crt.  Ratio (mcg/mg creat)   Date Value   02/12/2021 32 (H)     LDL Cholesterol (mg/dL)   Date Value   02/12/2021 46   02/12/2021 46       (goal LDL is <100)   AST (U/L)   Date Value   02/12/2021 26   02/12/2021 26     ALT (U/L)   Date Value   02/12/2021 27   02/12/2021 27     BUN (mg/dL)   Date Value   02/12/2021 18   02/12/2021 18     BP Readings from Last 3 Encounters:   07/29/21 138/89   07/06/21 (!) 160/90   05/21/21 (!) 154/91          (goal 120/80)        Patient Active Problem List:     Diabetes mellitus type 2, uncontrolled, with complications ([address]     Essential hypertension     Atypical chest pain     Need for prophylactic vaccination and inoculation against varicella     Allergic rhinitis      ----Kavon Jones

## 2021-09-18 DIAGNOSIS — G62.9 NEUROPATHY: ICD-10-CM

## 2021-09-20 RX ORDER — GABAPENTIN 600 MG/1
600 TABLET ORAL 3 TIMES DAILY
Qty: 90 TABLET | Refills: 0 | Status: SHIPPED | OUTPATIENT
Start: 2021-09-20 | End: 2021-10-14 | Stop reason: SDUPTHER

## 2021-09-20 NOTE — TELEPHONE ENCOUNTER
Gabapentin pending for refill     Health Maintenance   Topic Date Due    Diabetic retinal exam  Never done    COVID-19 Vaccine (1) Never done    Colon cancer screen colonoscopy  Never done    Hepatitis B vaccine (2 of 3 - Risk 3-dose series) 05/10/2019    Diabetic foot exam  01/29/2020    Shingles Vaccine (2 of 2) 02/01/2020    Annual Wellness Visit (AWV)  Never done    Flu vaccine (1) 09/01/2021    A1C test (Diabetic or Prediabetic)  11/09/2021    Diabetic microalbuminuria test  02/12/2022    Lipid screen  02/12/2022    Potassium monitoring  02/12/2022    Creatinine monitoring  02/12/2022    DTaP/Tdap/Td vaccine (2 - Td or Tdap) 08/10/2028    Pneumococcal 0-64 years Vaccine (2 of 2 - PPSV23) 01/13/2033    Hepatitis C screen  Completed    HIV screen  Completed    Hepatitis A vaccine  Aged Out    Hib vaccine  Aged Out    Meningococcal (ACWY) vaccine  Aged Out             (applicable per patient's age: Cancer Screenings, Depression Screening, Fall Risk Screening, Immunizations)    Hemoglobin A1C (%)   Date Value   01/29/2019 11.6 (H)   11/21/2018 12.5   08/10/2018 11.8     Microalb/Crt. Ratio (mcg/mg creat)   Date Value   02/12/2021 32 (H)     LDL Cholesterol (mg/dL)   Date Value   02/12/2021 46   02/12/2021 46     AST (U/L)   Date Value   02/12/2021 26   02/12/2021 26     ALT (U/L)   Date Value   02/12/2021 27   02/12/2021 27     BUN (mg/dL)   Date Value   02/12/2021 18   02/12/2021 18      (goal A1C is < 7)   (goal LDL is <100) need 30-50% reduction from baseline     BP Readings from Last 3 Encounters:   07/29/21 138/89   07/06/21 (!) 160/90   05/21/21 (!) 154/91    (goal /80)      All Future Testing planned in [facility]:  Lab Frequency Next Occurrence   Cologuard Once 02/11/2022   ECHO Complete 2D W Doppler W Color Once 02/12/2021       Next Visit Date:  No future appointments.          Patient Active Problem List:     Diabetes mellitus type 2, uncontrolled, with complications ([address] Essential hypertension     Atypical chest pain     Need for prophylactic vaccination and inoculation against varicella     Allergic rhinitis

## 2021-10-12 ENCOUNTER — NURSE TRIAGE (OUTPATIENT)
Dept: OTHER | Facility: CLINIC | Age: 51
End: 2021-10-12

## 2021-10-12 NOTE — TELEPHONE ENCOUNTER
Reason for Disposition   MILD swelling of both ankles (i.e., pedal edema) AND new onset or worsening    Answer Assessment - Initial Assessment Questions  1. ONSET: \"When did the swelling start? \" (e.g., minutes, hours, days)  1 month ago    2. LOCATION: \"What part of the leg is swollen? \"  \"Are both legs swollen or just one leg? \"     Bilateral, right worse than left, mostly foot and ankle    3. SEVERITY: \"How bad is the swelling? \" (e.g., localized; mild, moderate, severe)   - Localized - small area of swelling localized to one leg   - MILD pedal edema - swelling limited to foot and ankle, pitting edema < 1/4 inch (6 mm) deep, rest and elevation eliminate most or all swelling   - MODERATE edema - swelling of lower leg to knee, pitting edema > 1/4 inch (6 mm) deep, rest and elevation only partially reduce swelling   - SEVERE edema - swelling extends above knee, facial or hand swelling present     Mild    4. REDNESS: \"Does the swelling look red or infected? \"  No    5. PAIN: \"Is the swelling painful to touch? \" If so, ask: \"How painful is it? \"   (Scale 1-10; mild, moderate or severe)  More pain with walking 0/10 right now    6. FEVER: \"Do you have a fever? \" If so, ask: \"What is it, how was it measured, and when did it start? \"   None per calling    7. CAUSE: \"What do you think is causing the leg swelling? \"  Not sure    8. MEDICAL HISTORY: \"Do you have a history of heart failure, kidney disease, liver failure, or cancer? \"  None    9. RECURRENT SYMPTOM: \"Have you had leg swelling before? \" If so, ask: \"When was the last time? \" \"What happened that time? \"     Yes, when his A1C was between 11-12, about a year or so ago    10. OTHER SYMPTOMS: \"Do you have any other symptoms? \" (e.g., chest pain, difficulty breathing)   none    11. PREGNANCY: \"Is there any chance you are pregnant? \" \"When was your last menstrual period? \"  n/a    Protocols used: LEG SWELLING AND EDEMA-ADULT-[place]    Received call from [facility] with [facility] Complaint. Brief description of triage: swollen feet and ankles, right is worse, has been going on for a month. Triage indicates for patient to be seen within 3 days. Care advice provided, patient verbalizes understanding; denies any other questions or concerns; instructed to call back for any new or worsening symptoms. Writer provided warm transfer to [place] at [facility] for appointment scheduling. Attention Provider: Thank you for allowing me to participate in the care of your patient. The patient was connected to triage in response to information provided to the E[facility]/PSC. Please do not respond through this encounter as the response is not directed to a shared pool.

## 2021-10-14 ENCOUNTER — OFFICE VISIT (OUTPATIENT)
Dept: FAMILY MEDICINE CLINIC | Age: 51
End: 2021-10-14
Payer: MEDICARE

## 2021-10-14 VITALS
WEIGHT: 261.8 LBS | DIASTOLIC BLOOD PRESSURE: 93 MMHG | SYSTOLIC BLOOD PRESSURE: 138 MMHG | BODY MASS INDEX: 35.51 KG/M2 | HEART RATE: 95 BPM

## 2021-10-14 DIAGNOSIS — M25.473 ANKLE SWELLING, UNSPECIFIED LATERALITY: Primary | ICD-10-CM

## 2021-10-14 DIAGNOSIS — I10 HYPERTENSION, UNSPECIFIED TYPE: ICD-10-CM

## 2021-10-14 DIAGNOSIS — N52.9 ERECTILE DYSFUNCTION, UNSPECIFIED ERECTILE DYSFUNCTION TYPE: ICD-10-CM

## 2021-10-14 DIAGNOSIS — E11.69 TYPE 2 DIABETES MELLITUS WITH OTHER SPECIFIED COMPLICATION, WITH LONG-TERM CURRENT USE OF INSULIN (HCC): ICD-10-CM

## 2021-10-14 DIAGNOSIS — G62.9 NEUROPATHY: ICD-10-CM

## 2021-10-14 DIAGNOSIS — R07.89 CHEST PAIN, ATYPICAL: ICD-10-CM

## 2021-10-14 DIAGNOSIS — I10 ESSENTIAL HYPERTENSION: ICD-10-CM

## 2021-10-14 DIAGNOSIS — Z79.4 TYPE 2 DIABETES MELLITUS WITH OTHER SPECIFIED COMPLICATION, WITH LONG-TERM CURRENT USE OF INSULIN (HCC): ICD-10-CM

## 2021-10-14 DIAGNOSIS — J30.9 ALLERGIC RHINITIS, UNSPECIFIED SEASONALITY, UNSPECIFIED TRIGGER: ICD-10-CM

## 2021-10-14 DIAGNOSIS — R10.13 DYSPEPSIA: ICD-10-CM

## 2021-10-14 PROCEDURE — 99213 OFFICE O/P EST LOW 20 MIN: CPT | Performed by: STUDENT IN AN ORGANIZED HEALTH CARE EDUCATION/TRAINING PROGRAM

## 2021-10-14 PROCEDURE — 90686 IIV4 VACC NO PRSV 0.5 ML IM: CPT | Performed by: FAMILY MEDICINE

## 2021-10-14 RX ORDER — METOPROLOL SUCCINATE 25 MG/1
TABLET, EXTENDED RELEASE ORAL
Qty: 90 TABLET | Refills: 1 | Status: SHIPPED | OUTPATIENT
Start: 2021-10-14 | End: 2021-11-29 | Stop reason: DRUGHIGH

## 2021-10-14 RX ORDER — GABAPENTIN 600 MG/1
600 TABLET ORAL 3 TIMES DAILY
Qty: 90 TABLET | Refills: 0 | Status: SHIPPED | OUTPATIENT
Start: 2021-10-14 | End: 2021-12-02

## 2021-10-14 RX ORDER — CETIRIZINE HYDROCHLORIDE 10 MG/1
10 TABLET ORAL DAILY
Qty: 30 TABLET | Refills: 3 | Status: SHIPPED | OUTPATIENT
Start: 2021-10-14 | End: 2022-03-14

## 2021-10-14 RX ORDER — NITROGLYCERIN 0.4 MG/1
0.4 TABLET SUBLINGUAL EVERY 5 MIN PRN
Qty: 25 TABLET | Refills: 3 | Status: SHIPPED | OUTPATIENT
Start: 2021-10-14 | End: 2022-09-15 | Stop reason: SDUPTHER

## 2021-10-14 RX ORDER — LISINOPRIL 40 MG/1
TABLET ORAL
Qty: 30 TABLET | Refills: 5 | Status: SHIPPED | OUTPATIENT
Start: 2021-10-14 | End: 2022-04-25

## 2021-10-14 RX ORDER — FAMOTIDINE 40 MG/1
20 TABLET, FILM COATED ORAL EVERY EVENING
Qty: 30 TABLET | Refills: 2 | Status: SHIPPED | OUTPATIENT
Start: 2021-10-14 | End: 2022-03-24

## 2021-10-14 RX ORDER — TADALAFIL 10 MG/1
10 TABLET ORAL PRN
Qty: 30 TABLET | Refills: 3 | Status: CANCELLED | OUTPATIENT
Start: 2021-10-14

## 2021-10-14 ASSESSMENT — ENCOUNTER SYMPTOMS
COLOR CHANGE: 0
SHORTNESS OF BREATH: 0
ABDOMINAL PAIN: 0
NAUSEA: 0
DIARRHEA: 0
CHEST TIGHTNESS: 0
COUGH: 0
CONSTIPATION: 0
VOMITING: 0
BACK PAIN: 0

## 2021-10-14 NOTE — PROGRESS NOTES
Subjective:    Todd Song is a 46 y.o. male with  has a past medical history of Cervical disc disease and Diabetes mellitus ([facility] [address] Presented to the office today for:  Chief Complaint   Patient presents with    Swelling     feet and ankles swelling       HPI    46 Y O M with PMHx of DM, HTN presents with complaint of feet and ankle swelling associated wit sharp pain on the lateral aspect of the foot. He also reports itching of the rightthat is severe that leads to him to scratch it. Patient reports substernal chest pain tightness in nature. DM: on insulin pump, A1c 9.3 last week, patient follow up with dr. Shivam Jackson who just adjusted his insulin. HTN: /105, on lisinopril and Toprol XL. Diet is somewhat healthy. He walks 1 hour everyday. Denies smoking, alcohol or drug use. Agrees for flu and shingles vaccine. He had diabetic retinal exam 3 months ago, had a glaucoma surgery 2 months ago. Review of Systems   Constitutional: Negative for activity change, appetite change, chills, fatigue and fever. HENT: Negative. Respiratory: Negative for cough, chest tightness and shortness of breath. Cardiovascular: Negative for chest pain, palpitations and leg swelling. Gastrointestinal: Negative for abdominal pain, constipation, diarrhea, nausea and vomiting. Genitourinary: Negative for decreased urine volume, difficulty urinating, dysuria, enuresis, frequency and hematuria. Musculoskeletal: Negative for arthralgias, back pain and neck pain. Skin: Negative for color change. Neurological: Positive for numbness. Negative for dizziness, weakness, light-headedness and headaches.        The patient has a   Family History   Problem Relation Age of Onset    Diabetes Mother     Diabetes Father     Hypertension Father     Stroke Sister     Other Sister         HIP REPLACEMENT       Objective:    BP (!) 138/93 (Site: Left Upper Arm, Position: Sitting, Cuff Size: Medium Adult)   Pulse 95   Wt 261 lb 12.8 oz (118.8 kg)   BMI 35.51 kg/m²    BP Readings from Last 3 Encounters:   10/14/21 (!) 138/93   07/29/21 138/89   07/06/21 (!) 160/90       Physical Exam  Vitals and nursing note reviewed. Constitutional:       General: He is not in acute distress. Appearance: Normal appearance. He is not ill-appearing. HENT:      Head: Normocephalic and atraumatic. Mouth/Throat:      Pharynx: Oropharynx is clear. Cardiovascular:      Rate and Rhythm: Normal rate and regular rhythm. Pulses: Normal pulses. Heart sounds: No murmur heard. Pulmonary:      Effort: Pulmonary effort is normal.      Breath sounds: Normal breath sounds. Abdominal:      Palpations: Abdomen is soft. There is no mass. Tenderness: There is no abdominal tenderness. Musculoskeletal:         General: No swelling or tenderness. Normal range of motion. Cervical back: Normal range of motion and neck supple. Neurological:      General: No focal deficit present. Mental Status: He is alert and oriented to person, place, and time.          Lab Results   Component Value Date    WBC 5.0 06/06/2018    HGB 14.0 06/06/2018    HCT 43.2 06/06/2018     06/06/2018    CHOL 95 02/12/2021    CHOL 95 02/12/2021    TRIG 67 02/12/2021    TRIG 67 02/12/2021    HDL 36 (L) 02/12/2021    HDL 36 (L) 02/12/2021    ALT 27 02/12/2021    ALT 27 02/12/2021    AST 26 02/12/2021    AST 26 02/12/2021     02/12/2021     02/12/2021    K 4.5 02/12/2021    K 4.5 02/12/2021     02/12/2021     02/12/2021    CREATININE 0.73 02/12/2021    CREATININE 0.73 02/12/2021    BUN 18 02/12/2021    BUN 18 02/12/2021    CO2 31 02/12/2021    CO2 31 02/12/2021    LABA1C 11.6 (H) 01/29/2019    LABMICR 32 (H) 02/12/2021     Lab Results   Component Value Date    CALCIUM 9.5 02/12/2021    CALCIUM 9.5 02/12/2021     Lab Results   Component Value Date    LDLCHOLESTEROL 46 02/12/2021    LDLCHOLESTEROL 46 02/12/2021       Assessment and Plan:    1. Ankle swelling, unspecified laterality  History of diabetes, A1c 9.3.multiple skin breakages on the left foot. Mark Figures [address]    2. Type 2 diabetes mellitus with other specified complication, with long-term current use of insulin ([address]  Patient follows up with Dr. Isis Nagy. A1c 9.3 last week. Insulin pump dosage was adjusted. Counseled patient regarding daily foot check and diabetic foot exam.    - [facility] [address]    3. Chest pain, atypical  Tightness/heaviness chest pain once a week. no other associated symptoms. Due to patient's past medical history we will get a stress test and add Nitrostat for chest pain as needed. patient expressed understanding via teach back mechanism. Patient expressed understanding for 1 to seek urgent medical care. - NM MYOCARDIAL SPECT REST EXERCISE OR RX; Future  - nitroGLYCERIN (NITROSTAT) 0.4 MG SL tablet; Place 1 tablet under the tongue every 5 minutes as needed for Chest pain (chest pain) up to max of 3 total doses. If no relief after 1 dose, call 911. Dispense: 25 tablet; Refill: 3    4. Essential hypertension  /93. controlled. we will continue current treatment  - metoprolol succinate (TOPROL XL) 25 MG extended release tablet; Take one tablet once a day  Dispense: 90 tablet; Refill: 1  - lisinopril (PRINIVIL;ZESTRIL) 40 MG tablet; take 1 tablet by mouth once daily  Dispense: 30 tablet; Refill: 5    5. Neuropathy    - gabapentin (NEURONTIN) 600 MG tablet; Take 1 tablet by mouth 3 times daily for 30 days. Dispense: 90 tablet; Refill: 0    9. Dyspepsia    - famotidine (PEPCID) 40 MG tablet; Take 0.5 tablets by mouth every evening  Dispense: 30 tablet; Refill: 2    10. Allergic rhinitis, unspecified seasonality, unspecified trigger    - cetirizine (ZYRTEC) 10 MG tablet; Take 1 tablet by mouth daily  Dispense: 30 tablet;  Refill: 3          Requested Prescriptions     Signed Prescriptions Disp Refills    metoprolol succinate (TOPROL XL) 25 MG extended release tablet 90 tablet 1     Sig: Take one tablet once a day    lisinopril (PRINIVIL;ZESTRIL) 40 MG tablet 30 tablet 5     Sig: take 1 tablet by mouth once daily    gabapentin (NEURONTIN) 600 MG tablet 90 tablet 0     Sig: Take 1 tablet by mouth 3 times daily for 30 days.  famotidine (PEPCID) 40 MG tablet 30 tablet 2     Sig: Take 0.5 tablets by mouth every evening    cetirizine (ZYRTEC) 10 MG tablet 30 tablet 3     Sig: Take 1 tablet by mouth daily    nitroGLYCERIN (NITROSTAT) 0.4 MG SL tablet 25 tablet 3     Sig: Place 1 tablet under the tongue every 5 minutes as needed for Chest pain (chest pain) up to max of 3 total doses. If no relief after 1 dose, call 911. Medications Discontinued During This Encounter   Medication Reason    tadalafil (CIALIS) 10 MG tablet LIST CLEANUP    cetirizine (ZYRTEC) 10 MG tablet REORDER    metoprolol succinate (TOPROL XL) 25 MG extended release tablet REORDER    famotidine (PEPCID) 40 MG tablet REORDER    lisinopril (PRINIVIL;ZESTRIL) 40 MG tablet REORDER    gabapentin (NEURONTIN) 600 MG tablet REORDER       Raul received counseling on the following healthy behaviors: nutrition, exercise and medication adherence    Discussed use,benefit, and side effects of prescribed medications. Barriers to medication compliance addressed. All patient questions answered. Pt voiced understanding. Return in about 4 weeks (around 11/11/2021) for follow up chest pain. Disclaimer: Some orall of this note was transcribed using voice-recognition software. This may cause typographical errors occasionally. Although all effort is made to fix these errors, please do not hesitate to contact our office if there Neel Diaz concern with the understanding of this note.

## 2021-10-14 NOTE — PROGRESS NOTES
Visit Information    Have you changed or started any medications since your last visit including any over-the-counter medicines, vitamins, or herbal medicines? no   Are you having any side effects from any of your medications? -  no  Have you stopped taking any of your medications? Is so, why? -  no    Have you seen any other physician or provider since your last visit? No  Have you had any other diagnostic tests since your last visit? No  Have you been seen in the emergency room and/or had an admission to a hospital since we last saw you? No  Have you had your routine dental cleaning in the past 6 months? no    Have you activated your [employer] account? If not, what are your barriers?  Yes     Patient Care Team:  Azalea Osorio MD as PCP - General (Family Medicine)    Medical History Review  Past Medical, Family, and Social History reviewed and does not contribute to the patient presenting condition    Health Maintenance   Topic Date Due    Diabetic retinal exam  Never done    COVID-19 Vaccine (1) Never done    Colon cancer screen colonoscopy  Never done    Hepatitis B vaccine (2 of 3 - Risk 3-dose series) 05/10/2019    Diabetic foot exam  01/29/2020    Shingles Vaccine (2 of 2) 02/01/2020    Annual Wellness Visit (AWV)  Never done    Flu vaccine (1) 09/01/2021    A1C test (Diabetic or Prediabetic)  11/09/2021    Diabetic microalbuminuria test  02/12/2022    Lipid screen  02/12/2022    Potassium monitoring  02/12/2022    Creatinine monitoring  02/12/2022    DTaP/Tdap/Td vaccine (2 - Td or Tdap) 08/10/2028    Pneumococcal 0-64 years Vaccine (2 of 2 - PPSV23) 01/13/2033    Hepatitis C screen  Completed    HIV screen  Completed    Hepatitis A vaccine  Aged Out    Hib vaccine  Aged Out    Meningococcal (ACWY) vaccine  Aged Out

## 2021-10-14 NOTE — PROGRESS NOTES
I have reviewed and discussed beasley elements of [address] [place] [address] with the resident including plan of care and follow up and agree with the care federico plan. Reports episodes chest heaviness. Will get stress test.    Patient given rx for nitrostat sublingual tabs. Careful instruction on use and calling 911. Patient expressed understanding. Patient was able to \"teach back\" to the resident the points of their discussion. Is on ASA.

## 2021-10-14 NOTE — PATIENT INSTRUCTIONS
Thank you for letting us take care of you today. We hope all your questions were addressed. If a question was overlooked or something else comes to mind after you return home, please contact a member of your Care Team listed below. Your Care Team at [place] [address] is Team #1  Chris Gabriel MD (Faculty)  Zuleyma Clay MD(Resident)  Juancho Padron MD (Resident)  Honey Marr., SWATI Wills., Anant Dove., [facility] office)  Brandee Stephen, [address] (Clinical Practice Manager)  Nora Raya [facility] (Clinical Pharmacist)     Office phone number: [phone]    If you need to get in right away due to illness, please be advised we have \"Same Day\" appointments available Monday-Friday. Please call us at [phone] option #3 to schedule your \"Same Day\" appointment. Patient Education        Chest Pain: Care Instructions  Your Care Instructions     There are many things that can cause chest pain. Some are not serious and will get better on their own in a few days. But some kinds of chest pain need more testing and treatment. Your doctor may have recommended a follow-up visit in the next 8 to 12 hours. If you are not getting better, you may need more tests or treatment. Even though your doctor has released you, you still need to watch for any problems. The doctor carefully checked you, but sometimes problems can develop later. If you have new symptoms or if your symptoms do not get better, get medical care right away. If you have worse or different chest pain or pressure that lasts more than 5 minutes or you passed out (lost consciousness), call 911 or seek other emergency help right away. A medical visit is only one step in your treatment. Even if you feel better, you still need to do what your doctor recommends, such as going to all suggested follow-up appointments and taking medicines exactly as directed. This will help you recover and help prevent future problems.   How can you care for yourself at home?  · Rest until you feel better. · Take your medicine exactly as prescribed. Call your doctor if you think you are having a problem with your medicine. · Do not drive after taking a prescription pain medicine. When should you call for help? Call 911 if:     · You passed out (lost consciousness).     · You have severe difficulty breathing.     · You have symptoms of a heart attack. These may include:  ? Chest pain or pressure, or a strange feeling in your chest.  ? Sweating. ? Shortness of breath. ? Nausea or vomiting. ? Pain, pressure, or a strange feeling in your back, neck, jaw, or upper belly or in one or both shoulders or arms. ? Lightheadedness or sudden weakness. ? A fast or irregular heartbeat. After you call 911, the  may tell you to chew 1 adult-strength or 2 to 4 low-dose aspirin. Wait for an ambulance. Do not try to drive yourself. Call your doctor today if:     · You have any trouble breathing.     · Your chest pain gets worse.     · You are dizzy or lightheaded, or you feel like you may faint.     · You are not getting better as expected.     · You are having new or different chest pain. Where can you learn more? Go to https://[employer]. org and sign in to your [employer] account. Enter A120 in the [facility] box to learn more about \"Chest Pain: Care Instructions. \"     If you do not have an account, please click on the \"Sign Up Now\" link. Current as of: July 1, 2021               Content Version: 13.0  © 2006-2021 Healthwise, Incorporated. Care instructions adapted under license by [facility] ([facility]). If you have questions about a medical condition or this instruction, always ask your healthcare professional. [place] [address] any warranty or liability for your use of this information.

## 2021-11-05 ENCOUNTER — VIRTUAL VISIT (OUTPATIENT)
Dept: FAMILY MEDICINE CLINIC | Age: 51
End: 2021-11-05
Payer: MEDICARE

## 2021-11-05 ENCOUNTER — NURSE TRIAGE (OUTPATIENT)
Dept: OTHER | Facility: CLINIC | Age: 51
End: 2021-11-05

## 2021-11-05 ENCOUNTER — TELEPHONE (OUTPATIENT)
Dept: FAMILY MEDICINE CLINIC | Age: 51
End: 2021-11-05

## 2021-11-05 DIAGNOSIS — I10 ESSENTIAL HYPERTENSION: Primary | ICD-10-CM

## 2021-11-05 DIAGNOSIS — M79.674 PAIN IN RIGHT TOE(S): ICD-10-CM

## 2021-11-05 PROCEDURE — 99442 PR PHYS/QHP TELEPHONE EVALUATION 11-20 MIN: CPT | Performed by: STUDENT IN AN ORGANIZED HEALTH CARE EDUCATION/TRAINING PROGRAM

## 2021-11-05 NOTE — TELEPHONE ENCOUNTER
Received call from Grover at [facility] with [employer]. Brief description of triage: Recent Covid suspect with complaint of right foot swelling with toe swelling in 2,3,4,and 5th digits with pus filled blisters, r/o Covid foot. Triage indicates for patient to Virtual visit with PCP    Care advice provided, patient verbalizes understanding; denies any other questions or concerns; instructed to call back for any new or worsening symptoms. Writer provided warm transfer to Grover at [facility] for appointment scheduling. Attention Provider: Thank you for allowing me to participate in the care of your patient. The patient was connected to triage in response to information provided to the [facility]/PSC. Please do not respond through this encounter as the response is not directed to a shared pool. Reason for Disposition   Looks like a boil, infected sore, deep ulcer or other infected rash (spreading redness, pus)    Answer Assessment - Initial Assessment Questions  1. LOCATION: \"Which joint is swollen? \"      Right foot    2. ONSET: \"When did the swelling start? \"      Today    3. SIZE: \"How large is the swelling? \"      5th digit is the biggest. blisters are pus filled    4. PAIN: \"Is there any pain? \" If so, ask: \"How bad is it? \" (Scale 1-10; or mild, moderate, severe)      No    5. CAUSE: \"What do you think caused the swollen joint? \"      Covid foot    6. OTHER SYMPTOMS: \"Do you have any other symptoms? \" (e.g., fever, chest pain, difficulty breathing, calf pain)      No    7. PREGNANCY: \"Is there any chance you are pregnant? \" \"When was your last menstrual period? \"      N/a    Protocols used: ANKLE SWELLING-ADULT-

## 2021-11-05 NOTE — PATIENT INSTRUCTIONS
Thank you for letting us take care of you today. We hope all your questions were addressed. If a question was overlooked or something else comes to mind after you return home, please contact a member of your Care Team listed below. Your Care Team at [place] [address] is Team #5  Juan Carlos Samuels MD (Faculty)  Pj Saleem MD (Resident)  Zach Reina MD (Resident)  Ramila Vides MD (Resident)  Waylon Lynch MD (Resident)  Katharine Santos., CRISTIANE Yancey., ELIZABETH Sepulveda., Mariela Reyes., [facility] office)  Jennifer Madrid, [address] (Clinical Practice Manager)  Beatriz Lyman [facility] (Clinical Pharmacist)       Office phone number: [phone]    If you need to get in right away due to illness, please be advised we have \"Same Day\" appointments available Monday-Friday. Please call us at [phone] option #3 to schedule your \"Same Day\" appointment.

## 2021-11-05 NOTE — PROGRESS NOTES
Sofi Bustos is a 46 y.o. male evaluated via telephone on 11/5/2021. Consent:  He and/or health care decision maker is aware that that he may receive a bill for this telephone service, depending on his insurance coverage, and has provided verbal consent to proceed: Yes      Documentation:  I communicated with the patient and/or health care decision maker about       Details of this discussion including any medical advice provided:     Patient is a 78-year-old man seen today via phone visit. Has had diagnosis of Covid since 26 October, on 11th of quarantine. States he woke up this morning with his right toe feeling up with pus. Denies any pain, only when leg is elevated. No stepping of toe, no trauma. Can only think of a floor heater that he may have put his toe to close to or stubbed his toe on. No insect bites. Told patient to ice the toe and use Tylenol or ibuprofen for pain control and will see patient in office next week. I affirm this is a Patient Initiated Episode with a Patient who has not had a related appointment within my department in the past 7 days or scheduled within the next 24 hours. Patient identification was verified at the start of the visit: Yes    Total Time: minutes: 11-20 minutes    The visit was conducted pursuant to the emergency declaration under the [address] [place] [address], [address] [place] [address] authority and the [employer] General Act. Patient identification was verified, and a caregiver was present when appropriate. The patient was located in a state where the provider was credentialed to provide care.     Note: not billable if this call serves to triage the patient into an appointment for the relevant concern      Ilda Richardson MD
Visit Information    Have you changed or started any medications since your last visit including any over-the-counter medicines, vitamins, or herbal medicines? no   Are you having any side effects from any of your medications? -  no  Have you stopped taking any of your medications? Is so, why? -  no    Have you seen any other physician or provider since your last visit? No  Have you had any other diagnostic tests since your last visit? No  Have you been seen in the emergency room and/or had an admission to a hospital since we last saw you? No  Have you had your routine dental cleaning in the past 6 months? no    Have you activated your [employer] account? If not, what are your barriers?  Yes     Patient Care Team:  Alex Rdz MD as PCP - General (Family Medicine)    Medical History Review  Past Medical, Family, and Social History reviewed and does not contribute to the patient presenting condition    Health Maintenance   Topic Date Due    Diabetic retinal exam  Never done    COVID-19 Vaccine (1) Never done    Colon cancer screen colonoscopy  Never done    Hepatitis B vaccine (2 of 3 - Risk 3-dose series) 05/10/2019    Diabetic foot exam  01/29/2020    Shingles Vaccine (2 of 2) 02/01/2020    Annual Wellness Visit (AWV)  Never done    A1C test (Diabetic or Prediabetic)  11/09/2021    Diabetic microalbuminuria test  02/12/2022    Lipid screen  02/12/2022    Potassium monitoring  02/12/2022    Creatinine monitoring  02/12/2022    DTaP/Tdap/Td vaccine (2 - Td or Tdap) 08/10/2028    Pneumococcal 0-64 years Vaccine (2 of 2 - PPSV23) 01/13/2033    Flu vaccine  Completed    Hepatitis C screen  Completed    HIV screen  Completed    Hepatitis A vaccine  Aged Out    Hib vaccine  Aged Out    Meningococcal (ACWY) vaccine  Aged Out
Cristian Roger MD

## 2021-11-05 NOTE — TELEPHONE ENCOUNTER
Patient called 3 times regarding phone visit on 11/05/2021 no answer with either number in the chart.

## 2021-11-09 ENCOUNTER — TELEPHONE (OUTPATIENT)
Dept: FAMILY MEDICINE CLINIC | Age: 51
End: 2021-11-09

## 2021-11-09 NOTE — TELEPHONE ENCOUNTER
----- Message from Nicholas Ramirez sent at 11/9/2021 12:59 PM EST -----  Subject: Message to Provider    QUESTIONS  Information for Provider? PT wanting to follow up on toe blisters, PT   spoke with provider on 10/5 about this - now the 3/4 blisters have burst   leaving the skin red and wounds exposed. PT said he was told this could be   \"covid toes\" today is the last day of his quarantine and he is stating he   is symptom free. PT needs advise on what to do with open wounds. ---------------------------------------------------------------------------  --------------  [facility] INFO  What is the best way for the office to contact you? OK to leave message on   voicemail  Preferred Call Back Phone Number? [phone]  ---------------------------------------------------------------------------  --------------  SCRIPT ANSWERS  Relationship to Patient?  Self

## 2021-11-10 ENCOUNTER — HOSPITAL ENCOUNTER (INPATIENT)
Age: 51
LOS: 2 days | Discharge: HOME HEALTH | DRG: 935 | End: 2021-11-12
Attending: EMERGENCY MEDICINE | Admitting: HOSPITALIST
Payer: MEDICARE

## 2021-11-10 ENCOUNTER — OFFICE VISIT (OUTPATIENT)
Dept: FAMILY MEDICINE CLINIC | Age: 51
End: 2021-11-10
Payer: MEDICARE

## 2021-11-10 ENCOUNTER — APPOINTMENT (OUTPATIENT)
Dept: GENERAL RADIOLOGY | Age: 51
DRG: 935 | End: 2021-11-10
Payer: MEDICARE

## 2021-11-10 VITALS
WEIGHT: 260.2 LBS | DIASTOLIC BLOOD PRESSURE: 87 MMHG | BODY MASS INDEX: 35.29 KG/M2 | HEART RATE: 97 BPM | SYSTOLIC BLOOD PRESSURE: 139 MMHG

## 2021-11-10 DIAGNOSIS — L97.512 DIABETIC ULCER OF TOE OF RIGHT FOOT ASSOCIATED WITH TYPE 2 DIABETES MELLITUS, WITH FAT LAYER EXPOSED (HCC): Primary | ICD-10-CM

## 2021-11-10 DIAGNOSIS — E11.621 DIABETIC ULCER OF TOE OF RIGHT FOOT ASSOCIATED WITH TYPE 2 DIABETES MELLITUS, WITH FAT LAYER EXPOSED (HCC): Primary | ICD-10-CM

## 2021-11-10 DIAGNOSIS — L97.511 ULCER OF RIGHT FOOT, LIMITED TO BREAKDOWN OF SKIN (HCC): Primary | ICD-10-CM

## 2021-11-10 PROBLEM — L08.9 FOOT INFECTION: Status: ACTIVE | Noted: 2021-11-10

## 2021-11-10 LAB
ABSOLUTE EOS #: 0.09 K/UL (ref 0–0.44)
ABSOLUTE IMMATURE GRANULOCYTE: <0.03 K/UL (ref 0–0.3)
ABSOLUTE LYMPH #: 1.57 K/UL (ref 1.1–3.7)
ABSOLUTE MONO #: 0.73 K/UL (ref 0.1–1.2)
ANION GAP SERPL CALCULATED.3IONS-SCNC: 7 MMOL/L (ref 9–17)
BASOPHILS # BLD: 0 % (ref 0–2)
BASOPHILS ABSOLUTE: <0.03 K/UL (ref 0–0.2)
BUN BLDV-MCNC: 8 MG/DL (ref 6–20)
BUN/CREAT BLD: ABNORMAL (ref 9–20)
C-REACTIVE PROTEIN: 62.7 MG/L (ref 0–5)
CALCIUM SERPL-MCNC: 8.9 MG/DL (ref 8.6–10.4)
CHLORIDE BLD-SCNC: 98 MMOL/L (ref 98–107)
CO2: 30 MMOL/L (ref 20–31)
CREAT SERPL-MCNC: 0.76 MG/DL (ref 0.7–1.2)
DIFFERENTIAL TYPE: ABNORMAL
EOSINOPHILS RELATIVE PERCENT: 2 % (ref 1–4)
GFR AFRICAN AMERICAN: >60 ML/MIN
GFR NON-AFRICAN AMERICAN: >60 ML/MIN
GFR SERPL CREATININE-BSD FRML MDRD: ABNORMAL ML/MIN/{1.73_M2}
GFR SERPL CREATININE-BSD FRML MDRD: ABNORMAL ML/MIN/{1.73_M2}
GLUCOSE BLD-MCNC: 140 MG/DL (ref 75–110)
GLUCOSE BLD-MCNC: 188 MG/DL (ref 70–99)
HCT VFR BLD CALC: 39.9 % (ref 40.7–50.3)
HEMOGLOBIN: 12.9 G/DL (ref 13–17)
IMMATURE GRANULOCYTES: 0 %
LYMPHOCYTES # BLD: 26 % (ref 24–43)
MCH RBC QN AUTO: 30.4 PG (ref 25.2–33.5)
MCHC RBC AUTO-ENTMCNC: 32.3 G/DL (ref 28.4–34.8)
MCV RBC AUTO: 93.9 FL (ref 82.6–102.9)
MONOCYTES # BLD: 12 % (ref 3–12)
NRBC AUTOMATED: 0 PER 100 WBC
PDW BLD-RTO: 12.3 % (ref 11.8–14.4)
PLATELET # BLD: 201 K/UL (ref 138–453)
PLATELET ESTIMATE: ABNORMAL
PMV BLD AUTO: 10.5 FL (ref 8.1–13.5)
POTASSIUM SERPL-SCNC: 4.3 MMOL/L (ref 3.7–5.3)
RBC # BLD: 4.25 M/UL (ref 4.21–5.77)
RBC # BLD: ABNORMAL 10*6/UL
SEDIMENTATION RATE, ERYTHROCYTE: 48 MM (ref 0–20)
SEG NEUTROPHILS: 60 % (ref 36–65)
SEGMENTED NEUTROPHILS ABSOLUTE COUNT: 3.52 K/UL (ref 1.5–8.1)
SODIUM BLD-SCNC: 135 MMOL/L (ref 135–144)
WBC # BLD: 6 K/UL (ref 3.5–11.3)
WBC # BLD: ABNORMAL 10*3/UL

## 2021-11-10 PROCEDURE — G0378 HOSPITAL OBSERVATION PER HR: HCPCS

## 2021-11-10 PROCEDURE — 86140 C-REACTIVE PROTEIN: CPT

## 2021-11-10 PROCEDURE — 6370000000 HC RX 637 (ALT 250 FOR IP): Performed by: STUDENT IN AN ORGANIZED HEALTH CARE EDUCATION/TRAINING PROGRAM

## 2021-11-10 PROCEDURE — 99214 OFFICE O/P EST MOD 30 MIN: CPT | Performed by: STUDENT IN AN ORGANIZED HEALTH CARE EDUCATION/TRAINING PROGRAM

## 2021-11-10 PROCEDURE — 99223 1ST HOSP IP/OBS HIGH 75: CPT | Performed by: PODIATRIST

## 2021-11-10 PROCEDURE — 0JBQ0ZZ EXCISION OF RIGHT FOOT SUBCUTANEOUS TISSUE AND FASCIA, OPEN APPROACH: ICD-10-PCS | Performed by: PODIATRIST

## 2021-11-10 PROCEDURE — 99282 EMERGENCY DEPT VISIT SF MDM: CPT

## 2021-11-10 PROCEDURE — 85652 RBC SED RATE AUTOMATED: CPT

## 2021-11-10 PROCEDURE — 82947 ASSAY GLUCOSE BLOOD QUANT: CPT

## 2021-11-10 PROCEDURE — 96365 THER/PROPH/DIAG IV INF INIT: CPT

## 2021-11-10 PROCEDURE — 85025 COMPLETE CBC W/AUTO DIFF WBC: CPT

## 2021-11-10 PROCEDURE — 96372 THER/PROPH/DIAG INJ SC/IM: CPT

## 2021-11-10 PROCEDURE — 73630 X-RAY EXAM OF FOOT: CPT

## 2021-11-10 PROCEDURE — 1200000000 HC SEMI PRIVATE

## 2021-11-10 PROCEDURE — 2580000003 HC RX 258: Performed by: GENERAL PRACTICE

## 2021-11-10 PROCEDURE — 6360000002 HC RX W HCPCS: Performed by: GENERAL PRACTICE

## 2021-11-10 PROCEDURE — 80048 BASIC METABOLIC PNL TOTAL CA: CPT

## 2021-11-10 PROCEDURE — 6360000002 HC RX W HCPCS: Performed by: STUDENT IN AN ORGANIZED HEALTH CARE EDUCATION/TRAINING PROGRAM

## 2021-11-10 PROCEDURE — 96375 TX/PRO/DX INJ NEW DRUG ADDON: CPT

## 2021-11-10 PROCEDURE — 2580000003 HC RX 258: Performed by: STUDENT IN AN ORGANIZED HEALTH CARE EDUCATION/TRAINING PROGRAM

## 2021-11-10 RX ORDER — NITROGLYCERIN 0.4 MG/1
0.4 TABLET SUBLINGUAL EVERY 5 MIN PRN
Status: DISCONTINUED | OUTPATIENT
Start: 2021-11-10 | End: 2021-11-12 | Stop reason: HOSPADM

## 2021-11-10 RX ORDER — POLYETHYLENE GLYCOL 3350 17 G/17G
17 POWDER, FOR SOLUTION ORAL DAILY PRN
Status: DISCONTINUED | OUTPATIENT
Start: 2021-11-10 | End: 2021-11-12 | Stop reason: HOSPADM

## 2021-11-10 RX ORDER — DEXTROSE MONOHYDRATE 50 MG/ML
100 INJECTION, SOLUTION INTRAVENOUS PRN
Status: DISCONTINUED | OUTPATIENT
Start: 2021-11-10 | End: 2021-11-12 | Stop reason: HOSPADM

## 2021-11-10 RX ORDER — POTASSIUM CHLORIDE 7.45 MG/ML
10 INJECTION INTRAVENOUS PRN
Status: DISCONTINUED | OUTPATIENT
Start: 2021-11-10 | End: 2021-11-12 | Stop reason: HOSPADM

## 2021-11-10 RX ORDER — ATORVASTATIN CALCIUM 40 MG/1
40 TABLET, FILM COATED ORAL DAILY
Status: DISCONTINUED | OUTPATIENT
Start: 2021-11-11 | End: 2021-11-12 | Stop reason: HOSPADM

## 2021-11-10 RX ORDER — GLUCAGON 1 MG/ML
1 KIT INJECTION PRN
Status: DISCONTINUED | OUTPATIENT
Start: 2021-11-10 | End: 2021-11-12 | Stop reason: HOSPADM

## 2021-11-10 RX ORDER — SODIUM CHLORIDE 0.9 % (FLUSH) 0.9 %
5-40 SYRINGE (ML) INJECTION EVERY 12 HOURS SCHEDULED
Status: DISCONTINUED | OUTPATIENT
Start: 2021-11-10 | End: 2021-11-12 | Stop reason: HOSPADM

## 2021-11-10 RX ORDER — ONDANSETRON 4 MG/1
4 TABLET, ORALLY DISINTEGRATING ORAL EVERY 8 HOURS PRN
Status: DISCONTINUED | OUTPATIENT
Start: 2021-11-10 | End: 2021-11-12 | Stop reason: HOSPADM

## 2021-11-10 RX ORDER — POTASSIUM CHLORIDE 20 MEQ/1
40 TABLET, EXTENDED RELEASE ORAL PRN
Status: DISCONTINUED | OUTPATIENT
Start: 2021-11-10 | End: 2021-11-12 | Stop reason: HOSPADM

## 2021-11-10 RX ORDER — ONDANSETRON 2 MG/ML
4 INJECTION INTRAMUSCULAR; INTRAVENOUS EVERY 6 HOURS PRN
Status: DISCONTINUED | OUTPATIENT
Start: 2021-11-10 | End: 2021-11-12 | Stop reason: HOSPADM

## 2021-11-10 RX ORDER — ACETAMINOPHEN 650 MG/1
650 SUPPOSITORY RECTAL EVERY 6 HOURS PRN
Status: DISCONTINUED | OUTPATIENT
Start: 2021-11-10 | End: 2021-11-12 | Stop reason: HOSPADM

## 2021-11-10 RX ORDER — SODIUM CHLORIDE 9 MG/ML
25 INJECTION, SOLUTION INTRAVENOUS PRN
Status: DISCONTINUED | OUTPATIENT
Start: 2021-11-10 | End: 2021-11-12 | Stop reason: HOSPADM

## 2021-11-10 RX ORDER — ASPIRIN 81 MG/1
81 TABLET ORAL DAILY
Status: DISCONTINUED | OUTPATIENT
Start: 2021-11-11 | End: 2021-11-12 | Stop reason: HOSPADM

## 2021-11-10 RX ORDER — GABAPENTIN 600 MG/1
600 TABLET ORAL 3 TIMES DAILY
Status: DISCONTINUED | OUTPATIENT
Start: 2021-11-10 | End: 2021-11-12 | Stop reason: HOSPADM

## 2021-11-10 RX ORDER — FAMOTIDINE 20 MG/1
20 TABLET, FILM COATED ORAL EVERY EVENING
Status: DISCONTINUED | OUTPATIENT
Start: 2021-11-10 | End: 2021-11-12 | Stop reason: HOSPADM

## 2021-11-10 RX ORDER — MORPHINE SULFATE 2 MG/ML
2 INJECTION, SOLUTION INTRAMUSCULAR; INTRAVENOUS EVERY 4 HOURS PRN
Status: DISCONTINUED | OUTPATIENT
Start: 2021-11-10 | End: 2021-11-12 | Stop reason: HOSPADM

## 2021-11-10 RX ORDER — DEXTROSE MONOHYDRATE 25 G/50ML
12.5 INJECTION, SOLUTION INTRAVENOUS PRN
Status: DISCONTINUED | OUTPATIENT
Start: 2021-11-10 | End: 2021-11-12 | Stop reason: HOSPADM

## 2021-11-10 RX ORDER — ACETAMINOPHEN 325 MG/1
650 TABLET ORAL EVERY 6 HOURS PRN
Status: DISCONTINUED | OUTPATIENT
Start: 2021-11-10 | End: 2021-11-12 | Stop reason: HOSPADM

## 2021-11-10 RX ORDER — NICOTINE POLACRILEX 4 MG
15 LOZENGE BUCCAL PRN
Status: DISCONTINUED | OUTPATIENT
Start: 2021-11-10 | End: 2021-11-12 | Stop reason: HOSPADM

## 2021-11-10 RX ORDER — CETIRIZINE HYDROCHLORIDE 10 MG/1
10 TABLET ORAL DAILY
Status: DISCONTINUED | OUTPATIENT
Start: 2021-11-10 | End: 2021-11-12 | Stop reason: HOSPADM

## 2021-11-10 RX ORDER — MAGNESIUM SULFATE IN WATER 40 MG/ML
2000 INJECTION, SOLUTION INTRAVENOUS PRN
Status: DISCONTINUED | OUTPATIENT
Start: 2021-11-10 | End: 2021-11-12 | Stop reason: HOSPADM

## 2021-11-10 RX ORDER — LISINOPRIL 20 MG/1
40 TABLET ORAL DAILY
Status: DISCONTINUED | OUTPATIENT
Start: 2021-11-10 | End: 2021-11-12 | Stop reason: HOSPADM

## 2021-11-10 RX ORDER — SODIUM CHLORIDE 0.9 % (FLUSH) 0.9 %
5-40 SYRINGE (ML) INJECTION PRN
Status: DISCONTINUED | OUTPATIENT
Start: 2021-11-10 | End: 2021-11-12 | Stop reason: HOSPADM

## 2021-11-10 RX ORDER — METOPROLOL SUCCINATE 25 MG/1
25 TABLET, EXTENDED RELEASE ORAL DAILY
Status: DISCONTINUED | OUTPATIENT
Start: 2021-11-11 | End: 2021-11-12 | Stop reason: HOSPADM

## 2021-11-10 RX ADMIN — LISINOPRIL 40 MG: 20 TABLET ORAL at 20:51

## 2021-11-10 RX ADMIN — MORPHINE SULFATE 2 MG: 2 INJECTION, SOLUTION INTRAMUSCULAR; INTRAVENOUS at 22:43

## 2021-11-10 RX ADMIN — ENOXAPARIN SODIUM 40 MG: 100 INJECTION SUBCUTANEOUS at 20:51

## 2021-11-10 RX ADMIN — VANCOMYCIN HYDROCHLORIDE 1750 MG: 10 INJECTION, POWDER, LYOPHILIZED, FOR SOLUTION INTRAVENOUS at 22:45

## 2021-11-10 RX ADMIN — GABAPENTIN 600 MG: 600 TABLET ORAL at 20:51

## 2021-11-10 RX ADMIN — FAMOTIDINE 20 MG: 20 TABLET, FILM COATED ORAL at 20:51

## 2021-11-10 RX ADMIN — CETIRIZINE HYDROCHLORIDE 10 MG: 10 TABLET ORAL at 20:51

## 2021-11-10 RX ADMIN — SODIUM CHLORIDE, PRESERVATIVE FREE 10 ML: 5 INJECTION INTRAVENOUS at 22:43

## 2021-11-10 ASSESSMENT — PAIN DESCRIPTION - ORIENTATION: ORIENTATION: RIGHT

## 2021-11-10 ASSESSMENT — ENCOUNTER SYMPTOMS
CONSTIPATION: 0
WHEEZING: 0
ABDOMINAL PAIN: 0
DIARRHEA: 0
RHINORRHEA: 0
VOMITING: 0
VOMITING: 1
CONSTIPATION: 0
COUGH: 0
DIARRHEA: 1
CHEST TIGHTNESS: 0
ABDOMINAL PAIN: 0
COUGH: 0
SHORTNESS OF BREATH: 0
BACK PAIN: 0
SORE THROAT: 0
SHORTNESS OF BREATH: 0
NAUSEA: 0

## 2021-11-10 ASSESSMENT — PAIN SCALES - GENERAL
PAINLEVEL_OUTOF10: 7
PAINLEVEL_OUTOF10: 2
PAINLEVEL_OUTOF10: 7

## 2021-11-10 ASSESSMENT — PAIN DESCRIPTION - PAIN TYPE: TYPE: ACUTE PAIN

## 2021-11-10 ASSESSMENT — PAIN DESCRIPTION - LOCATION: LOCATION: FOOT

## 2021-11-10 NOTE — ED PROVIDER NOTES
use: No    Sexual activity: Not on file   Other Topics Concern    Not on file   Social History Narrative    Not on file     Social Determinants of Health     Financial Resource Strain:     Difficulty of Paying Living Expenses: Not on file   Food Insecurity:     Worried About Running Out of Food in the Last Year: Not on file    Artie of Food in the Last Year: Not on file   Transportation Needs:     Lack of Transportation (Medical): Not on file    Lack of Transportation (Non-Medical): Not on file   Physical Activity:     Days of Exercise per Week: Not on file    Minutes of Exercise per Session: Not on file   Stress:     Feeling of Stress : Not on file   Social Connections:     Frequency of Communication with Friends and Family: Not on file    Frequency of Social Gatherings with Friends and Family: Not on file    Attends Alevism Services: Not on file    Active Member of [address] [place] [address] "[employer] or Organizations: Not on file    Attends Club or Organization Meetings: Not on file    Marital Status: Not on file   Intimate Partner Violence:     Fear of Current or Ex-Partner: Not on file    Emotionally Abused: Not on file    Physically Abused: Not on file    Sexually Abused: Not on file   Housing Stability:     Unable to Pay for Housing in the Last Year: Not on file    Number of [place] in the Last Year: Not on file    Unstable Housing in the Last Year: Not on file       Family History   Problem Relation Age of Onset    Diabetes Mother     Diabetes Father     Hypertension Father     Stroke Sister     Other Sister         HIP REPLACEMENT       Allergies:  Patient has no known allergies. Home Medications:  Prior to Admission medications    Medication Sig Start Date End Date Taking?  Authorizing Provider   metoprolol succinate (TOPROL XL) 25 MG extended release tablet Take one tablet once a day 10/14/21   Ayush Rose MD   lisinopril (PRINIVIL;ZESTRIL) 40 MG tablet take 1 tablet by mouth once daily 10/14/21   Bharath Mckay MD   gabapentin (NEURONTIN) 600 MG tablet Take 1 tablet by mouth 3 times daily for 30 days. 10/14/21 11/13/21  Bharath Mckay MD   famotidine (PEPCID) 40 MG tablet Take 0.5 tablets by mouth every evening 10/14/21   Bharath Mckay MD   cetirizine (ZYRTEC) 10 MG tablet Take 1 tablet by mouth daily 10/14/21   Bharath Mckay MD   nitroGLYCERIN (NITROSTAT) 0.4 MG SL tablet Place 1 tablet under the tongue every 5 minutes as needed for Chest pain (chest pain) up to max of 3 total doses. If no relief after 1 dose, call 911. 10/14/21   Bharath Mckay MD   atorvastatin (LIPITOR) 40 MG tablet take 1 tablet by mouth every evening 9/10/21   Suzie Paul MD   aspirin (RA ASPIRIN EC) 81 MG EC tablet take 1 tablet by mouth once daily 5/21/21   Risa Key MD   insulin glargine (BASAGLAR KWIKPEN) 100 UNIT/ML injection pen inject 50 units subcutaneously twice a day 8/16/19   Risa Key MD   TRUE METRIX BLOOD GLUCOSE TEST strip TEST three times a day with meals as directed 6/13/19   Risa Key MD   Insulin Pen Needle (BD PEN NEEDLE EM U/F) 32G X 4 MM MISC USE TO INJECT INSULIN FIVE TIMES A DAY AS DIRECTED 5/28/19   Collin Cantu MD   NOVOLOG FLEXPEN 100 UNIT/ML injection pen PLEASE APPROVE. INJECT 8 UNITS SQ THREE TIMES A DAY BEFORE MEALS. MAX 24U/DAY 5/28/19   Risa Key MD   RA ALCOHOL SWABS 70 % PADS PLEASE APPROVE REFILLS FOR PATIENT.  USE TWICE DAILY WHEN INJECTING INSULIN 4/22/19   Collin Cantu MD   NOVOLOG FLEXPEN 100 UNIT/ML injection pen PATIENT STATES HE USES UP TO 10 UNITS THREE TIMES A DAY, COULD YOU PLEASE SUBMIT A PRESCRIPTION REFL 2/17/19   Risa Key MD   Blood Glucose Monitoring Suppl (ONE TOUCH ULTRA MINI) w/Device KIT 1 kit by Does not apply route daily 8/24/18   Risa Key MD   glucose monitoring kit (FREESTYLE) monitoring kit 1 kit by Does not apply route 3 times daily (before meals) 8/10/18   Risa Key MD       REVIEW OF SYSTEMS    (2-9 systems for level 4, 10 or more for level 5)      Review of Systems   Constitutional: Negative for activity change, appetite change, chills and fever. HENT: Negative for sore throat. Respiratory: Negative for cough and shortness of breath. Cardiovascular: Negative for chest pain. Gastrointestinal: Negative for abdominal pain, nausea and vomiting. Musculoskeletal: Positive for gait problem. Skin: Positive for wound. Neurological: Negative for headaches. Psychiatric/Behavioral: Negative for agitation, self-injury and suicidal ideas. The patient is not nervous/anxious. PHYSICAL EXAM   (up to 7 for level 4, 8 or more for level 5)      INITIAL VITALS:   /84   Pulse 84   Temp 99.2 °F (37.3 °C) (Oral)   Resp 16   SpO2 99%     Physical Exam  Vitals reviewed. Constitutional:       General: He is not in acute distress. Appearance: Normal appearance. He is not ill-appearing, toxic-appearing or diaphoretic. HENT:      Head: Normocephalic and atraumatic. Cardiovascular:      Rate and Rhythm: Normal rate. Pulmonary:      Comments: Breathing comfortable room air, symmetric chest rise, speaking full sentences, no evidence respiratory distress  Musculoskeletal:      Comments: Palpable DP and PT pulses bilaterally, patient has ulceration to the right great toe on the plantar surface, with fat noticed, there is diffuse blistering over the dorsal aspect of all toes on the right side, with noticeable swelling and warmth to the right foot extending into the right calf   Skin:     Capillary Refill: Capillary refill takes less than 2 seconds. Neurological:      General: No focal deficit present. Mental Status: He is alert and oriented to person, place, and time. Psychiatric:         Mood and Affect: Mood normal.         Behavior: Behavior normal.         Thought Content:  Thought content normal.         Judgment: Judgment normal.                 DIFFERENTIAL on broad-spectrum antibiotics vancomycin and Zosyn. X-ray does show concern for possible chronic osteomyelitis, patient's ESR and CRP are elevated. Podiatry consulted for evaluation, patient admitted to family medicine. PROCEDURES:  None    CONSULTS:  IP CONSULT TO PODIATRY  IP CONSULT TO FAMILY MEDICINE    CRITICAL CARE:  None    FINAL IMPRESSION      1. Diabetic ulcer of toe of right foot associated with type 2 diabetes mellitus, with fat layer exposed ([facility] [address]          DISPOSITION / PLAN     DISPOSITION Decision To Admit 11/10/2021 04:23:40 PM      PATIENT REFERRED TO:  No follow-up provider specified.     DISCHARGE MEDICATIONS:  New Prescriptions    No medications on file       Moon Cotter DO  Emergency Medicine Resident    (Please note that portions of thisnote were completed with a voice recognition program.  Efforts were made to edit the dictations but occasionally words are mis-transcribed.)     Moon Cotter DO  Resident  11/10/21 [id number] [facility],   Resident  11/10/21 [id number]

## 2021-11-10 NOTE — ED PROVIDER NOTES
[address] [facility]     Emergency Department     Faculty Attestation        I performed a history and physical examination of the patient and discussed management with the resident. I reviewed the residents note and agree with the documented findings and plan of care. Any areas of disagreement are noted on the chart. I was personally present for the key portions of any procedures. I have documented in the chart those procedures where I was not present during the key portions. I have reviewed the emergency nurses triage note. I agree with the chief complaint, past medical history, past surgical history, allergies, medications, social and family history as documented unless otherwise noted below. For Physician Assistant/ Nurse Practitioner cases/documentation I have personally evaluated this patient and have completed at least one if not all key elements of the E/M (history, physical exam, and MDM). Additional findings are as noted. Vital Signs: BP: 134/84  Pulse: 84  Resp: 16  Temp: 99.2 °F (37.3 °C) SpO2: 99 %  PCP:  Jose F Hammonds MD    Pertinent Comments:     Patient is a 57-year-old male with history of hypertension and diabetes and also a patient of [facility], who presents with right foot swelling of all toes on the right with some blistering as well. Has swelling and warmth custodial up the tibia. Has intact DP pulse however. Foot is warm and erythematous. Assessment/plan: Patient with diabetic wound with more extensive cellulitis. Will obtain x-ray as well as laboratories and broad-spectrum IV antibiotics including Zosyn and vancomycin. Will obtain podiatry consult as well. Admission to [facility]    Critical Care  None    This patient was evaluated in the Emergency Department for symptoms described in the history of present illness.  He/she was evaluated in the context of the global COVID-19 pandemic, which necessitated consideration that the patient might be at risk for infection with the SARS-CoV-2 virus that causes COVID-19. Institutional protocols and algorithms that pertain to the evaluation of patients at risk for COVID-19 are in a state of rapid change based on information released by regulatory bodies including the CDC and federal and state organizations. These policies and algorithms were followed during the patient's care in the ED. (Please note that portions of this note were completed with a voice recognition program. Efforts were made to edit the dictations but occasionally words are mis-transcribed.  Whenever words are used in this note in any gender, they shall be construed as though they were used in the gender appropriate to the circumstances; and whenever words are used in this note in the singular or plural form, they shall be construed as though they were used in the form appropriate to the circumstances.)    MD Sergo Arriaga  Attending Emergency Medicine Physician             Jose Cortés MD  11/10/21 [id number]

## 2021-11-10 NOTE — PROGRESS NOTES
Subjective:    Todd Song is a 46 y.o. male with  has a past medical history of Cervical disc disease and Diabetes mellitus ([facility] [address] Presented to the office today for:  Chief Complaint   Patient presents with    Follow-up     blister on feet       HPI    46 Y O M with PMHx of DM, HTN presents with complaint of feet and ankle swelling that started 10/30/2021 that has been worsening up until 11/2/2021 where he developed blisters on the right foot  That ruptures releasing clear fluid. Denies pain or numbness. He has history of uncontrolled diabetes and he follows up with Dr. Shivam Jackson. Last A1c as per patient was around 9.  /87 in the office today. on lisinopril and Toprol XL. Review of Systems   Constitutional: Negative for chills, fatigue and fever. Respiratory: Negative for cough and shortness of breath. Cardiovascular: Negative for chest pain and palpitations. Gastrointestinal: Negative for abdominal pain, constipation and diarrhea. Musculoskeletal:        Right foot swelling, blisters with skin breakage    Neurological: Negative for dizziness, weakness, light-headedness, numbness and headaches. The patient has a   Family History   Problem Relation Age of Onset    Diabetes Mother     Diabetes Father     Hypertension Father     Stroke Sister     Other Sister         HIP REPLACEMENT       Objective:    /87   Pulse 97   Wt 260 lb 3.2 oz (118 kg)   BMI 35.29 kg/m²    BP Readings from Last 3 Encounters:   11/10/21 134/84   11/10/21 139/87   10/14/21 (!) 138/93       Physical Exam  Vitals and nursing note reviewed. Constitutional:       General: He is not in acute distress. Appearance: Normal appearance. He is not ill-appearing. HENT:      Head: Normocephalic and atraumatic. Mouth/Throat:      Pharynx: Oropharynx is clear. Cardiovascular:      Rate and Rhythm: Normal rate and regular rhythm. Pulses: Normal pulses.       Heart sounds: No murmur heard.      Pulmonary:      Effort: Pulmonary effort is normal.      Breath sounds: Normal breath sounds. Abdominal:      Palpations: Abdomen is soft. There is no mass. Tenderness: There is no abdominal tenderness. Musculoskeletal:      Cervical back: Normal range of motion and neck supple. Comments: Right lower leg  Shiny in appearance, edema up to shin, swelling of right 2nd,3rd and 4th toes along with darkening of skin at these 3 digits. Ulcer on the base of 1st toe and lateral aspect fo the 5th toe. Pulses unpalpable on the right foot. Foot is cold to sensation with dimished sensation all over. Left foot  Looks less edematous, poorly healed ulcer on the dorsum of the foot. diminshed pulses. Foot is cool to touch along with decreased sensation. Neurological:      General: No focal deficit present. Mental Status: He is alert and oriented to person, place, and time. Lab Results   Component Value Date    WBC 5.0 06/06/2018    HGB 14.0 06/06/2018    HCT 43.2 06/06/2018     06/06/2018    CHOL 95 02/12/2021    CHOL 95 02/12/2021    TRIG 67 02/12/2021    TRIG 67 02/12/2021    HDL 36 (L) 02/12/2021    HDL 36 (L) 02/12/2021    ALT 27 02/12/2021    ALT 27 02/12/2021    AST 26 02/12/2021    AST 26 02/12/2021     02/12/2021     02/12/2021    K 4.5 02/12/2021    K 4.5 02/12/2021     02/12/2021     02/12/2021    CREATININE 0.73 02/12/2021    CREATININE 0.73 02/12/2021    BUN 18 02/12/2021    BUN 18 02/12/2021    CO2 31 02/12/2021    CO2 31 02/12/2021    LABA1C 11.6 (H) 01/29/2019    LABMICR 32 (H) 02/12/2021     Lab Results   Component Value Date    CALCIUM 9.5 02/12/2021    CALCIUM 9.5 02/12/2021     Lab Results   Component Value Date    LDLCHOLESTEROL 46 02/12/2021    LDLCHOLESTEROL 46 02/12/2021       Assessment and Plan:    1.  Ulcer of right foot, limited to breakdown of skin [facility])  Patient has 2 ulcers on the right foot, base of 1st toe and lateral aspect of 5th toe along with swelling of the right foot and ankle mostly pronounced at the 2nd, 3rd and 4th digits. In the setting of his poorly controlled diabetes along with his diminished sensation and un palpable pulses; Patient was counseled that he needs to go the ED to be evaluated by podiatry or vascular surgery. 2. Diabetes mellitus type 2, uncontrolled, with complications ([facility] [place] [address]  History of uncontrolled diabetes. Patient follows with Dr. Rafael Pederson Endocrinologist          Requested Prescriptions      No prescriptions requested or ordered in this encounter       There are no discontinued medications. Angus Jean received counseling on the following healthy behaviors: nutrition, exercise and medication adherence    Discussed use,benefit, and side effects of prescribed medications. Barriers to medication compliance addressed. All patient questions answered. Pt voiced understanding. Return in about 2 weeks (around 11/24/2021) for follow up ulcers. Disclaimer: Some orall of this note was transcribed using voice-recognition software. This may cause typographical errors occasionally. Although all effort is made to fix these errors, please do not hesitate to contact our office if there Ernesta Romberg concern with the understanding of this note.

## 2021-11-10 NOTE — CONSULTS
Consultation Note  Podiatric Medicine and Surgery     Subjective     Chief Complaint: Right foot cellulitis    HPI:  Tania Arango is a 46 y.o. male seen at [facility] emergency department for right foot cellulitis. The patient is unsure of when it started began to have some small blisters on his toes his foot began to swell and his leg then began to swell and he noticed it was warm. He does not have any pain associated with. He did not attempt any treatment. This been going on for more than 1 day. He is a diabetic he states he is recently lowered his A1c from 10 to approximately 8. He does state that he has cold feet in general and sleeps with a heater close to his feet and has for years. He is not sure if that is what caused this. The patient denies other complaints at this time. PCP is Patricia Mcnamara MD    ROS:   Review of Systems   Constitutional: Negative for chills, fatigue and fever. HENT: Negative for rhinorrhea and sore throat. Eyes: Negative for visual disturbance. Respiratory: Negative for cough, chest tightness and shortness of breath. Cardiovascular: Positive for leg swelling. Negative for chest pain and palpitations. Gastrointestinal: Negative for nausea and vomiting. Musculoskeletal: Negative for arthralgias and myalgias. Skin: Positive for wound. Neurological: Negative for weakness, numbness and headaches. Psychiatric/Behavioral: Negative for agitation and confusion. Past Medical History   has a past medical history of Cervical disc disease and Diabetes mellitus ([facility] [address] Past Surgical History   has a past surgical history that includes cyst removal (2008). Medications  Prior to Admission medications    Medication Sig Start Date End Date Taking?  Authorizing Provider   metoprolol succinate (TOPROL XL) 25 MG extended release tablet Take one tablet once a day 10/14/21   Safia Gomez MD   lisinopril (PRINIVIL;ZESTRIL) 40 MG tablet take 1 tablet by mouth once daily 10/14/21   Gerardo Oates MD   gabapentin (NEURONTIN) 600 MG tablet Take 1 tablet by mouth 3 times daily for 30 days. 10/14/21 11/13/21  Gerardo Oates MD   famotidine (PEPCID) 40 MG tablet Take 0.5 tablets by mouth every evening 10/14/21   Gerardo Oates MD   cetirizine (ZYRTEC) 10 MG tablet Take 1 tablet by mouth daily 10/14/21   Gerardo Oates MD   nitroGLYCERIN (NITROSTAT) 0.4 MG SL tablet Place 1 tablet under the tongue every 5 minutes as needed for Chest pain (chest pain) up to max of 3 total doses. If no relief after 1 dose, call 911. 10/14/21   Gerardo Oates MD   atorvastatin (LIPITOR) 40 MG tablet take 1 tablet by mouth every evening 9/10/21   Jean Claude Tello MD   aspirin (RA ASPIRIN EC) 81 MG EC tablet take 1 tablet by mouth once daily 5/21/21   Gennaro Ayala MD   insulin glargine (BASAGLAR KWIKPEN) 100 UNIT/ML injection pen inject 50 units subcutaneously twice a day 8/16/19   Gennaro Ayala MD   TRUE METRIX BLOOD GLUCOSE TEST strip TEST three times a day with meals as directed 6/13/19   Gennaro Ayala MD   Insulin Pen Needle (BD PEN NEEDLE EM U/F) 32G X 4 MM MISC USE TO INJECT INSULIN FIVE TIMES A DAY AS DIRECTED 5/28/19   Collin Cantu MD   NOVOLOG FLEXPEN 100 UNIT/ML injection pen PLEASE APPROVE. INJECT 8 UNITS SQ THREE TIMES A DAY BEFORE MEALS. MAX 24U/DAY 5/28/19   Gennaro Ayala MD RA ALCOHOL SWABS 70 % PADS PLEASE APPROVE REFILLS FOR PATIENT.  USE TWICE DAILY WHEN INJECTING INSULIN 4/22/19   Collin Cantu MD   NOVOLOG FLEXPEN 100 UNIT/ML injection pen PATIENT STATES HE USES UP TO 10 UNITS THREE TIMES A DAY, COULD YOU PLEASE SUBMIT A PRESCRIPTION REFL 2/17/19   Gennaro Ayala MD   Blood Glucose Monitoring Suppl (ONE TOUCH ULTRA MINI) w/Device KIT 1 kit by Does not apply route daily 8/24/18   Gennaro Ayala MD   glucose monitoring kit (FREESTYLE) monitoring kit 1 kit by Does not apply route 3 times daily (before meals) 8/10/18   Gennaro Ayala MD Scheduled Meds:   vancomycin  15 mg/kg IntraVENous Once    piperacillin-tazobactam  3,375 mg IntraVENous Once    [START ON 2021] aspirin  81 mg Oral Daily    [START ON 2021] atorvastatin  40 mg Oral Daily    cetirizine  10 mg Oral Daily    famotidine  20 mg Oral QPM    gabapentin  600 mg Oral TID    lisinopril  40 mg Oral Daily    [START ON 2021] metoprolol succinate  25 mg Oral Daily    [START ON 2021] piperacillin-tazobactam  3,375 mg IntraVENous Q8H    sodium chloride flush  5-40 mL IntraVENous 2 times per day    enoxaparin  40 mg SubCUTAneous Daily     Continuous Infusions:   sodium chloride      dextrose       PRN Meds:.nitroGLYCERIN, sodium chloride flush, sodium chloride, ondansetron **OR** ondansetron, polyethylene glycol, acetaminophen **OR** acetaminophen, potassium chloride **OR** potassium alternative oral replacement **OR** potassium chloride, magnesium sulfate, morphine, glucose, dextrose, glucagon (rDNA), dextrose    Allergies  has No Known Allergies. Family History  family history includes Diabetes in his father and mother; Hypertension in his father; Other in his sister; Stroke in his sister. Social History   reports that he quit smoking about 26 years ago. His smoking use included cigars. He quit after 6.00 years of use. He has never used smokeless tobacco.   reports no history of alcohol use. reports no history of drug use.     Objective     Vitals:  Patient Vitals for the past 8 hrs:   BP Temp Temp src Pulse Resp SpO2   11/10/21 1611 134/84 99.2 °F (37.3 °C) Oral 84 16 99 %     Average, Min, and Max for last 24 hours Vitals:  TEMPERATURE:  Temp  Av.2 °F (37.3 °C)  Min: 99.2 °F (37.3 °C)  Max: 99.2 °F (37.3 °C)    RESPIRATIONS RANGE: Resp  Av  Min: 16  Max: 16    PULSE RANGE: Pulse  Av.5  Min: 84  Max: 97    BLOOD PRESSURE RANGE:  Systolic (09JUJ), SIT:125 , Min:134 , INK:690   ; Diastolic (41RMV), RIL:77, Min:84, Max:91      PULSE OXIMETRY RANGE: SpO2  Av %  Min: 99 %  Max: 99 %  I&O:  No intake/output data recorded. CBC:  Recent Labs     11/10/21  1702   WBC 6.0   HGB 12.9*   HCT 39.9*      CRP 62.7*        BMP:  Recent Labs     11/10/21  1702      K 4.3   CL 98   CO2 30   BUN 8   CREATININE 0.76   GLUCOSE 188*   CALCIUM 8.9        Coags:  No results for input(s): APTT, PROT, INR in the last 72 hours. Lab Results   Component Value Date    LABA1C 11.6 (H) 2019     Lab Results   Component Value Date    SEDRATE 48 (H) 11/10/2021     Lab Results   Component Value Date    CRP 62.7 (H) 11/10/2021         Physical Exam:  Vascular: DP and PT pulses are nonpalpable, dopplerable. CFT <3 seconds to all pedal digits. Right lower extremity nonpitting edema. Temperature gradient warm to warm from the tibial tuberosity to distal digits right side. Neuro: Light touch sensation is absent to the level of the pedal digits. Musculoskeletal: Muscle strength is 5/5, baseline, to all lower extremity muscle groups. Palpation of bilateral pedal joints elicits no pain. Gross deformity is absent. Dermatologic: Partial thickness ulcerations located dorsal right toes 1 through 5 which encompassed the entirety of the dorsal digits as seen in the clinical picture below. Base is granular. Periwound skin appears slightly macerated with sloughing. Limited amount of serous drainage noted without associated mal odor. Limited right foot and leg erythema with associated increase in warmth. Negative probe to bone. Negative sinus tracking. The wound does not undermine. No fluctuance, crepitus, or induration. Interdigital maceration absent, Bilateral.  Nails 1-10 are slightly thickened, elongated. Clinical:           Imaging:   XR FOOT RIGHT (MIN 3 VIEWS)   Final Result   Generalized soft tissue swelling. No definite osseous erosive change.   Periosteal reaction along the 4th   metatarsal diaphysis could reflect chronic osteomyelitis. Suggest further   evaluation with MRI. Cultures: Not applicable    Assessment     Re Martin is a 46 y.o. male with   1. Cellulitis right lower extremity  2. Type 2 diabetes with polyneuropathy and foot ulceration  3. Superficial ulceration of right foot    Active Problems:    Diabetes mellitus type 2, uncontrolled, with complications ([address]    Essential hypertension    Foot infection  Resolved Problems:    * No resolved hospital problems. *        Plan     · Patient examined and evaluated at bedside   · Treatment options discussed in detail with the patient which revealed he was unaware of his lack of protective sensation to his lower extremities. Spent over 20 minutes discussing proper pedal care considering his diabetes and associated neuropathy, proper shoe gear, and advised him on appropriate diet and education. Patient is interested in diabetic education and should be ordered when he is on the floor. · Radiographs reviewed and discussed in detail with patient negative for soft tissue gas, osteomyelitis  · Patient admitted to the floor through medicine for treatment with IV antibiotics to control and reduce cellulitis    · Sharp excisional debridment w 15 blade on the partial thickness blisters was done and patient tolerated the procedure well the wounds are limitied to the breakdown of dermal skin       · Dressing applied to right lower extremity: Silvadene, dry sterile dressing  · Full weightbearing status to bilateral lower extremity   · Discussed with Dr. Madelyn Cornell, SIM[facility]   Podiatric Medicine & Surgery   11/10/2021 at 6:58 PM    This note is created with the assistance of a speech recognition program.  While intending to generate a document that actually reflects the content of the visit, the document can still have some errors including those of syntax and sound a like substitutions which may escape proof reading.   In such instances, actual meaning can be extrapolated by contextual diversion. I performed a history and physical examination of the patient and discussed management with the resident. I reviewed the residents note and agree with the documented findings and plan of care. Any areas of disagreement are noted on the chart. I was personally present for the key portions of any procedures. I have documented in the chart those procedures where I was not present during the key portions. I have reviewed the Podiatry Resident progress note. I agree with the chief complaint, past medical history, past surgical history, allergies, medications, social and family history as documented unless otherwise noted below. Documentation of the HPI, Physical Exam and Medical Decision Making performed by medical students or scribes is based on my personal performance of the HPI, PE and MDM. I have personally evaluated this patient and have completed at least one if not all key elements of the E/M (history, physical exam, and MDM). Additional findings are as noted.      Electronically signed by Iman Gallo DPM on 11/10/2021
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
## 2021-11-10 NOTE — PROGRESS NOTES
Visit Information    Have you changed or started any medications since your last visit including any over-the-counter medicines, vitamins, or herbal medicines? no   Are you having any side effects from any of your medications? -  no  Have you stopped taking any of your medications? Is so, why? -  no    Have you seen any other physician or provider since your last visit? No  Have you had any other diagnostic tests since your last visit? No  Have you been seen in the emergency room and/or had an admission to a hospital since we last saw you? No  Have you had your routine dental cleaning in the past 6 months? no    Have you activated your [employer] account? If not, what are your barriers?  Yes     Patient Care Team:  Bard Kimberlee MD as PCP - General (Family Medicine)    Medical History Review  Past Medical, Family, and Social History reviewed and does not contribute to the patient presenting condition    Health Maintenance   Topic Date Due    Diabetic retinal exam  Never done    COVID-19 Vaccine (1) Never done    Colon cancer screen colonoscopy  Never done    Hepatitis B vaccine (2 of 3 - Risk 3-dose series) 05/10/2019    Diabetic foot exam  01/29/2020    Shingles Vaccine (2 of 2) 02/01/2020    Annual Wellness Visit (AWV)  Never done    A1C test (Diabetic or Prediabetic)  11/09/2021    Diabetic microalbuminuria test  02/12/2022    Lipid screen  02/12/2022    Potassium monitoring  02/12/2022    Creatinine monitoring  02/12/2022    DTaP/Tdap/Td vaccine (2 - Td or Tdap) 08/10/2028    Pneumococcal 0-64 years Vaccine (2 of 2 - PPSV23) 01/13/2033    Flu vaccine  Completed    Hepatitis C screen  Completed    HIV screen  Completed    Hepatitis A vaccine  Aged Out    Hib vaccine  Aged Out    Meningococcal (ACWY) vaccine  Aged Out

## 2021-11-10 NOTE — PROGRESS NOTES
Attending Physician Statement    Wt Readings from Last 3 Encounters:   11/10/21 260 lb 3.2 oz (118 kg)   10/14/21 261 lb 12.8 oz (118.8 kg)   07/29/21 266 lb 3.2 oz (120.7 kg)     Temp Readings from Last 3 Encounters:   11/10/21 99.2 °F (37.3 °C) (Oral)   02/11/21 97.1 °F (36.2 °C) (Temporal)   11/18/19 97.4 °F (36.3 °C) (Temporal)     BP Readings from Last 3 Encounters:   11/10/21 134/84   11/10/21 139/87   10/14/21 (!) 138/93     Pulse Readings from Last 3 Encounters:   11/10/21 84   11/10/21 97   10/14/21 95         I have discussed the care of Luisana Brower, including pertinent history and exam findings with the resident. I have reviewed the key elements of all parts of the encounter with the resident. I agree with the assessment, plan and orders as documented by the resident.   (GE Modifier)

## 2021-11-11 ENCOUNTER — APPOINTMENT (OUTPATIENT)
Dept: MRI IMAGING | Age: 51
DRG: 935 | End: 2021-11-11
Payer: MEDICARE

## 2021-11-11 PROBLEM — L97.509 ULCER OF FOOT DUE TO SECONDARY DIABETES (HCC): Status: ACTIVE | Noted: 2021-11-11

## 2021-11-11 PROBLEM — E13.621 ULCER OF FOOT DUE TO SECONDARY DIABETES (HCC): Status: ACTIVE | Noted: 2021-11-11

## 2021-11-11 LAB
ABSOLUTE EOS #: 0.09 K/UL (ref 0–0.44)
ABSOLUTE IMMATURE GRANULOCYTE: <0.03 K/UL (ref 0–0.3)
ABSOLUTE LYMPH #: 1.79 K/UL (ref 1.1–3.7)
ABSOLUTE MONO #: 0.94 K/UL (ref 0.1–1.2)
ANION GAP SERPL CALCULATED.3IONS-SCNC: 11 MMOL/L (ref 9–17)
BASOPHILS # BLD: 1 % (ref 0–2)
BASOPHILS ABSOLUTE: 0.05 K/UL (ref 0–0.2)
BUN BLDV-MCNC: 7 MG/DL (ref 6–20)
BUN/CREAT BLD: ABNORMAL (ref 9–20)
CALCIUM SERPL-MCNC: 8.2 MG/DL (ref 8.6–10.4)
CHLORIDE BLD-SCNC: 100 MMOL/L (ref 98–107)
CO2: 25 MMOL/L (ref 20–31)
CREAT SERPL-MCNC: 0.68 MG/DL (ref 0.7–1.2)
DIFFERENTIAL TYPE: ABNORMAL
EOSINOPHILS RELATIVE PERCENT: 1 % (ref 1–4)
GFR AFRICAN AMERICAN: >60 ML/MIN
GFR NON-AFRICAN AMERICAN: >60 ML/MIN
GFR SERPL CREATININE-BSD FRML MDRD: ABNORMAL ML/MIN/{1.73_M2}
GFR SERPL CREATININE-BSD FRML MDRD: ABNORMAL ML/MIN/{1.73_M2}
GLUCOSE BLD-MCNC: 134 MG/DL (ref 75–110)
GLUCOSE BLD-MCNC: 170 MG/DL (ref 70–99)
GLUCOSE BLD-MCNC: 218 MG/DL (ref 75–110)
GLUCOSE BLD-MCNC: 247 MG/DL (ref 75–110)
GLUCOSE BLD-MCNC: 297 MG/DL (ref 75–110)
HCT VFR BLD CALC: 36.4 % (ref 40.7–50.3)
HEMOGLOBIN: 11.8 G/DL (ref 13–17)
IMMATURE GRANULOCYTES: 0 %
LYMPHOCYTES # BLD: 27 % (ref 24–43)
MAGNESIUM: 1.9 MG/DL (ref 1.6–2.6)
MCH RBC QN AUTO: 30.3 PG (ref 25.2–33.5)
MCHC RBC AUTO-ENTMCNC: 32.4 G/DL (ref 28.4–34.8)
MCV RBC AUTO: 93.3 FL (ref 82.6–102.9)
MONOCYTES # BLD: 14 % (ref 3–12)
NRBC AUTOMATED: 0 PER 100 WBC
PDW BLD-RTO: 12.2 % (ref 11.8–14.4)
PLATELET # BLD: 203 K/UL (ref 138–453)
PLATELET ESTIMATE: ABNORMAL
PMV BLD AUTO: 10.2 FL (ref 8.1–13.5)
POTASSIUM SERPL-SCNC: 4 MMOL/L (ref 3.7–5.3)
RBC # BLD: 3.9 M/UL (ref 4.21–5.77)
RBC # BLD: ABNORMAL 10*6/UL
SEG NEUTROPHILS: 57 % (ref 36–65)
SEGMENTED NEUTROPHILS ABSOLUTE COUNT: 3.65 K/UL (ref 1.5–8.1)
SODIUM BLD-SCNC: 136 MMOL/L (ref 135–144)
WBC # BLD: 6.5 K/UL (ref 3.5–11.3)
WBC # BLD: ABNORMAL 10*3/UL

## 2021-11-11 PROCEDURE — 85025 COMPLETE CBC W/AUTO DIFF WBC: CPT

## 2021-11-11 PROCEDURE — 73720 MRI LWR EXTREMITY W/O&W/DYE: CPT

## 2021-11-11 PROCEDURE — 96368 THER/DIAG CONCURRENT INF: CPT

## 2021-11-11 PROCEDURE — 6360000004 HC RX CONTRAST MEDICATION: Performed by: STUDENT IN AN ORGANIZED HEALTH CARE EDUCATION/TRAINING PROGRAM

## 2021-11-11 PROCEDURE — 2580000003 HC RX 258: Performed by: STUDENT IN AN ORGANIZED HEALTH CARE EDUCATION/TRAINING PROGRAM

## 2021-11-11 PROCEDURE — 6370000000 HC RX 637 (ALT 250 FOR IP): Performed by: STUDENT IN AN ORGANIZED HEALTH CARE EDUCATION/TRAINING PROGRAM

## 2021-11-11 PROCEDURE — G0378 HOSPITAL OBSERVATION PER HR: HCPCS

## 2021-11-11 PROCEDURE — 83735 ASSAY OF MAGNESIUM: CPT

## 2021-11-11 PROCEDURE — 97166 OT EVAL MOD COMPLEX 45 MIN: CPT

## 2021-11-11 PROCEDURE — 97162 PT EVAL MOD COMPLEX 30 MIN: CPT

## 2021-11-11 PROCEDURE — G0108 DIAB MANAGE TRN  PER INDIV: HCPCS

## 2021-11-11 PROCEDURE — 93005 ELECTROCARDIOGRAM TRACING: CPT | Performed by: STUDENT IN AN ORGANIZED HEALTH CARE EDUCATION/TRAINING PROGRAM

## 2021-11-11 PROCEDURE — 96366 THER/PROPH/DIAG IV INF ADDON: CPT

## 2021-11-11 PROCEDURE — 97535 SELF CARE MNGMENT TRAINING: CPT

## 2021-11-11 PROCEDURE — 97530 THERAPEUTIC ACTIVITIES: CPT

## 2021-11-11 PROCEDURE — 6360000002 HC RX W HCPCS: Performed by: STUDENT IN AN ORGANIZED HEALTH CARE EDUCATION/TRAINING PROGRAM

## 2021-11-11 PROCEDURE — A9579 GAD-BASE MR CONTRAST NOS,1ML: HCPCS | Performed by: STUDENT IN AN ORGANIZED HEALTH CARE EDUCATION/TRAINING PROGRAM

## 2021-11-11 PROCEDURE — 2500000003 HC RX 250 WO HCPCS

## 2021-11-11 PROCEDURE — 96372 THER/PROPH/DIAG INJ SC/IM: CPT

## 2021-11-11 PROCEDURE — 96367 TX/PROPH/DG ADDL SEQ IV INF: CPT

## 2021-11-11 PROCEDURE — 1200000000 HC SEMI PRIVATE

## 2021-11-11 PROCEDURE — 99219 PR INITIAL OBSERVATION CARE/DAY 50 MINUTES: CPT | Performed by: HOSPITALIST

## 2021-11-11 PROCEDURE — 82947 ASSAY GLUCOSE BLOOD QUANT: CPT

## 2021-11-11 PROCEDURE — 36415 COLL VENOUS BLD VENIPUNCTURE: CPT

## 2021-11-11 PROCEDURE — 80048 BASIC METABOLIC PNL TOTAL CA: CPT

## 2021-11-11 RX ORDER — LORAZEPAM 1 MG/1
1 TABLET ORAL ONCE
Status: COMPLETED | OUTPATIENT
Start: 2021-11-11 | End: 2021-11-11

## 2021-11-11 RX ORDER — SODIUM CHLORIDE 0.9 % (FLUSH) 0.9 %
10 SYRINGE (ML) INJECTION PRN
Status: DISCONTINUED | OUTPATIENT
Start: 2021-11-11 | End: 2021-11-12 | Stop reason: HOSPADM

## 2021-11-11 RX ADMIN — GABAPENTIN 600 MG: 600 TABLET ORAL at 21:12

## 2021-11-11 RX ADMIN — SILVER SULFADIAZINE: 10 CREAM TOPICAL at 12:04

## 2021-11-11 RX ADMIN — Medication 1500 MG: at 21:12

## 2021-11-11 RX ADMIN — METOPROLOL SUCCINATE 25 MG: 25 TABLET, FILM COATED, EXTENDED RELEASE ORAL at 08:05

## 2021-11-11 RX ADMIN — LORAZEPAM 1 MG: 1 TABLET ORAL at 12:30

## 2021-11-11 RX ADMIN — SODIUM CHLORIDE, PRESERVATIVE FREE 10 ML: 5 INJECTION INTRAVENOUS at 10:17

## 2021-11-11 RX ADMIN — PIPERACILLIN AND TAZOBACTAM 3375 MG: 3; .375 INJECTION, POWDER, LYOPHILIZED, FOR SOLUTION INTRAVENOUS at 16:06

## 2021-11-11 RX ADMIN — GABAPENTIN 600 MG: 600 TABLET ORAL at 08:05

## 2021-11-11 RX ADMIN — PIPERACILLIN AND TAZOBACTAM 3375 MG: 3; .375 INJECTION, POWDER, LYOPHILIZED, FOR SOLUTION INTRAVENOUS at 02:12

## 2021-11-11 RX ADMIN — Medication 1500 MG: at 08:04

## 2021-11-11 RX ADMIN — GABAPENTIN 600 MG: 600 TABLET ORAL at 15:04

## 2021-11-11 RX ADMIN — PIPERACILLIN AND TAZOBACTAM 3375 MG: 3; .375 INJECTION, POWDER, LYOPHILIZED, FOR SOLUTION INTRAVENOUS at 08:04

## 2021-11-11 RX ADMIN — GADOTERIDOL 20 ML: 279.3 INJECTION, SOLUTION INTRAVENOUS at 14:02

## 2021-11-11 RX ADMIN — DESMOPRESSIN ACETATE 40 MG: 0.2 TABLET ORAL at 08:05

## 2021-11-11 RX ADMIN — Medication 81 MG: at 08:05

## 2021-11-11 RX ADMIN — ENOXAPARIN SODIUM 40 MG: 100 INJECTION SUBCUTANEOUS at 08:48

## 2021-11-11 RX ADMIN — LISINOPRIL 40 MG: 20 TABLET ORAL at 08:05

## 2021-11-11 RX ADMIN — CETIRIZINE HYDROCHLORIDE 10 MG: 10 TABLET ORAL at 08:05

## 2021-11-11 ASSESSMENT — PAIN DESCRIPTION - ORIENTATION
ORIENTATION: RIGHT

## 2021-11-11 ASSESSMENT — ENCOUNTER SYMPTOMS
CONSTIPATION: 0
COUGH: 0
ALLERGIC/IMMUNOLOGIC NEGATIVE: 1
EYE PAIN: 0
DIARRHEA: 0
ABDOMINAL PAIN: 0
SORE THROAT: 0
NAUSEA: 0
WHEEZING: 0
SHORTNESS OF BREATH: 0
VOMITING: 0

## 2021-11-11 ASSESSMENT — PAIN DESCRIPTION - FREQUENCY: FREQUENCY: CONTINUOUS

## 2021-11-11 ASSESSMENT — PAIN SCALES - GENERAL
PAINLEVEL_OUTOF10: 5

## 2021-11-11 ASSESSMENT — PAIN DESCRIPTION - LOCATION
LOCATION: FOOT
LOCATION: TOE (COMMENT WHICH ONE)
LOCATION: TOE (COMMENT WHICH ONE)

## 2021-11-11 ASSESSMENT — PAIN DESCRIPTION - PAIN TYPE: TYPE: ACUTE PAIN

## 2021-11-11 ASSESSMENT — PAIN DESCRIPTION - DESCRIPTORS: DESCRIPTORS: ACHING;DISCOMFORT

## 2021-11-11 NOTE — PROGRESS NOTES
Physical Therapy    Facility/Department: [address] ORTHO/MED SURG  Initial Assessment    NAME: Todd Song  : 1970  MRN: [id number]    Date of Service: 2021  Chief Complaint   Patient presents with    Foot Injury     ulcer r foot x one week     Discharge Recommendations:    No further therapy required at discharge. PT Equipment Recommendations  Equipment Needed: Yes  Mobility Devices: Silvia Anahy: Rolling    Assessment   Assessment: The pt ambulated 125 ft with a RW x SBA  Prognosis: Good  Decision Making: Medium Complexity  PT Education: Goals; PT Role; Plan of Care  REQUIRES PT FOLLOW UP: No  Activity Tolerance  Activity Tolerance: Patient Tolerated treatment well       Patient Diagnosis(es): The encounter diagnosis was Diabetic ulcer of toe of right foot associated with type 2 diabetes mellitus, with fat layer exposed ([facility] [address] has a past medical history of Cervical disc disease and Diabetes mellitus ([facility] [address] has a past surgical history that includes cyst removal ().     Restrictions  Restrictions/Precautions  Restrictions/Precautions: Fall Risk, General Precautions, Weight Bearing  Required Braces or Orthoses?: No  Lower Extremity Weight Bearing Restrictions  Right Lower Extremity Weight Bearing: Weight Bearing As Tolerated  Left Lower Extremity Weight Bearing: Weight Bearing As Tolerated  Position Activity Restriction  Other position/activity restrictions: up with assistance  Vision/Hearing  Vision: Impaired  Vision Exceptions: Wears glasses at all times  Hearing: Within functional limits     Subjective  General  Patient assessed for rehabilitation services?: Yes  Response To Previous Treatment: Not applicable  Family / Caregiver Present: No  Follows Commands: Within Functional Limits  Subjective  Subjective: RN and pt agreeable to PT eval  Pain Screening  Patient Currently in Pain: Yes  Pain Assessment  Pain Assessment: 0-10  Pain Level: 5  Pain Location: Toe (Comment which one)  Pain Orientation: Right  Vital Signs  Patient Currently in Pain: Yes       Orientation  Orientation  Overall Orientation Status: Within Normal Limits  Social/Functional History  Social/Functional History  Lives With: Spouse  Type of Home: House  Home Layout: One level  Home Access: Stairs to enter with rails  Entrance Stairs - Number of Steps: 4  Entrance Stairs - Rails: Left  Bathroom Shower/Tub: Tub/Shower unit  Bathroom Toilet: Standard  Home Equipment: Cane (Pt reports using cane at home as pt c/o balance deficits)  ADL Assistance: Independent  Homemaking Assistance: Independent  Homemaking Responsibilities: Yes  Meal Prep Responsibility: No  Laundry Responsibility: Primary  Cleaning Responsibility: Primary  Ambulation Assistance: Independent  Transfer Assistance: Independent  Active : No  Patient's  Info: wife  Mode of Transportation: [facility]  Occupation: On disability  Type of occupation:   Leisure & Hobbies: basketball; get together with friends playing [email or web address]  Additional Comments: wife is in good health and home all the time to assist  Cognition      Objective     AROM RLE (degrees)  RLE AROM: WFL  AROM LLE (degrees)  LLE AROM : WNL  AROM RUE (degrees)  RUE AROM : WNL  AROM LUE (degrees)  LUE AROM : WNL  Strength RLE  Strength RLE: WFL  Strength LLE  Strength LLE: WNL  Strength RUE  Strength RUE: WNL  Strength LUE  Strength LUE: WNL     Sensation  Overall Sensation Status: Impaired  Bed mobility  Supine to Sit: Stand by assistance  Sit to Supine: Stand by assistance  Scooting: Stand by assistance  Transfers  Sit to Stand: Stand by assistance  Stand to sit: Stand by assistance  Ambulation  Ambulation?: Yes  Ambulation 1  Surface: level tile  Device: Rolling Walker  Assistance: Stand by assistance  Distance: amb 125 ft with a RW x SBA     Balance  Posture: Good  Sitting - Static: Good  Sitting - Dynamic: Good  Standing - Static: Good  Standing - Dynamic: Good      Plan   Plan  Times per week: ARNOLDO PT  Safety Devices  Type of devices: Nurse notified, Left in bed, Left in chair, Call light within reach, Chair alarm in place    G-Code     OutComes Score     AM-PAC Score  AM-PAC Inpatient Mobility Raw Score : 21 (11/11/21 1251)  AM-PAC Inpatient T-Scale Score : 50.25 (11/11/21 1251)  Mobility Inpatient CMS 0-100% Score: 28.97 (11/11/21 1251)  Mobility Inpatient CMS G-Code Modifier : Arnulfo Mccoy (11/11/21 1251)     Goals  Short term goals  Time Frame for Short term goals: No PT needs at this time    DC   PT     Therapy Time   Individual Concurrent Group Co-treatment   Time In 0825         Time Out 0850         Minutes [facility] Karl Conklin, PT

## 2021-11-11 NOTE — PROGRESS NOTES
Progress Note  Podiatric Medicine and Surgery     Subjective     CC: Right foot cellulitis    Patient seen and examined at bedside. No acute events overnight. Afebrile, vital signs stable   Pain controlled to right foot    HPI :    Luisana Brower is a 46 y.o. male seen at [facility] emergency department for right foot cellulitis. The patient is unsure of when it started began to have some small blisters on his toes his foot began to swell and his leg then began to swell and he noticed it was warm. He does not have any pain associated with. He did not attempt any treatment. This been going on for more than 1 day. He is a diabetic he states he is recently lowered his A1c from 10 to approximately 8. He does state that he has cold feet in general and sleeps with a heater close to his feet and has for years. He is not sure if that is what caused this. The patient denies other complaints at this time.     PCP is eGrmaine Mcdonald MD       ROS: Denies N/V/F/C/SOB/CP. Otherwise negative except at stated in the HPI.      Medications:  Scheduled Meds:   piperacillin-tazobactam  3,375 mg IntraVENous Once    aspirin  81 mg Oral Daily    atorvastatin  40 mg Oral Daily    cetirizine  10 mg Oral Daily    famotidine  20 mg Oral QPM    gabapentin  600 mg Oral TID    lisinopril  40 mg Oral Daily    metoprolol succinate  25 mg Oral Daily    piperacillin-tazobactam  3,375 mg IntraVENous Q8H    sodium chloride flush  5-40 mL IntraVENous 2 times per day    enoxaparin  40 mg SubCUTAneous Daily    vancomycin (VANCOCIN) intermittent dosing (placeholder)   Other RX Placeholder    vancomycin  1,500 mg IntraVENous Q12H       Continuous Infusions:   sodium chloride      dextrose         PRN Meds:nitroGLYCERIN, sodium chloride flush, sodium chloride, ondansetron **OR** ondansetron, polyethylene glycol, acetaminophen **OR** acetaminophen, potassium chloride **OR** potassium alternative oral replacement **OR** potassium chloride, magnesium sulfate, morphine, glucose, dextrose, glucagon (rDNA), dextrose    Objective     Vitals:  Patient Vitals for the past 8 hrs:   BP Temp Temp src Pulse SpO2   21 0632 115/78 98.9 °F (37.2 °C) Oral 94 96 %     Average, Min, and Max for last 24 hours Vitals:  TEMPERATURE:  Temp  Av.1 °F (37.3 °C)  Min: 98.9 °F (37.2 °C)  Max: 99.3 °F (37.4 °C)    RESPIRATIONS RANGE: Resp  Av  Min: 16  Max: 16    PULSE RANGE: Pulse  Av.7  Min: 84  Max: 97    BLOOD PRESSURE RANGE:  Systolic (49OFU), UIZ:005 , Min:115 , LFZ:731   ; Diastolic (82LEC), WHR:96, Min:78, Max:94      PULSE OXIMETRY RANGE: SpO2  Av.5 %  Min: 96 %  Max: 99 %    I/O last 3 completed shifts:  In: -   Out: 400 [Urine:400]    CBC:  Recent Labs     11/10/21  1702 11/11/21  0453   WBC 6.0 6.5   HGB 12.9* 11.8*   HCT 39.9* 36.4*    203   CRP 62.7*  --         BMP:  Recent Labs     11/10/21  1702 11/11/21  0453    136   K 4.3 4.0   CL 98 100   CO2 30 25   BUN 8 7   CREATININE 0.76 0.68*   GLUCOSE 188* 170*   CALCIUM 8.9 8.2*        Coags:  No results for input(s): APTT, PROT, INR in the last 72 hours. Lab Results   Component Value Date    SEDRATE 48 (H) 11/10/2021     Recent Labs     11/10/21  1702   CRP 62.7*       Lower Extremity Physical Exam:    Vascular: DP and PT pulses are nonpalpable, dopplerable. CFT <3 seconds to all pedal digits. Right lower extremity nonpitting edema. Temperature gradient warm to warm from the tibial tuberosity to distal digits right side.       Neuro: Light touch sensation is absent to the level of the pedal digits.     Musculoskeletal: Muscle strength is 5/5, baseline, to all lower extremity muscle groups. Palpation of bilateral pedal joints elicits no pain. Gross deformity is absent.     Dermatologic: Partial thickness ulcerations located dorsal right toes 1 through 5 which encompassed the entirety of the dorsal digits as seen in the clinical picture below. Base is granular. Periwound skin appears slightly macerated with sloughing. Limited amount of serous drainage noted without associated mal odor. Limited right foot and leg erythema with associated increase in warmth. Negative probe to bone. Negative sinus tracking. The wound does not undermine. No fluctuance, crepitus, or induration. Interdigital maceration absent, Bilateral.  Nails 1-10 are slightly thickened, elongated. Right lower extremity displays no erythema but mild edema and increased warmth compared to left lower extremity. Clinical Images:            Imaging:   XR FOOT RIGHT (MIN 3 VIEWS)   Final Result   Generalized soft tissue swelling. No definite osseous erosive change. Periosteal reaction along the 4th   metatarsal diaphysis could reflect chronic osteomyelitis. Suggest further   evaluation with MRI. MRI FOOT RIGHT W WO CONTRAST    (Results Pending)        Assessment   Genna Alexandre is a 46 y.o. male with   1. Cellulitis right lower extremity  2. Type 2 diabetes with polyneuropathy and foot ulceration  3. Superficial ulceration of right foot    Principal Problem:    Diabetic ulcer of toe of right foot associated with type 2 diabetes mellitus, with fat layer exposed ([address]  Active Problems:    Diabetes mellitus type 2, uncontrolled, with complications ([address]    Essential hypertension    Foot infection  Resolved Problems:    * No resolved hospital problems. *       Plan     · Patient examined and evaluated at bedside   · Treatment options discussed in detail with the patient. · Medicine following as primary team  · Antibiotics: Vancomycin/Zosyn  · Inpatient consult to diabetes education placed  · Dressing applied to right foot: Silvadene, Adaptic, DSD, Ace  · No acute surgical intervention planned by podiatry service at this time. Dressing changes to be completed by nursing, orders placed. Please PerfectServe with any questions or concerns. Podiatry to sign off.   Thank you for this consult.   · Full weightbearing status to bilateral lower extremities   · Discussed with Dr. Kate Harrington, DPM   Podiatric Medicine & Surgery   11/11/2021 at 1:07 PM

## 2021-11-11 NOTE — DISCHARGE INSTR - COC
Continuity of Care Form    Patient Name: Jackson Berger   :  1970  MRN:  [id number]    Admit date:  11/10/2021  Discharge date:  21    Code Status Order: Full Code   Advance Directives:      Admitting Physician:  Gerson Damon MD  PCP: Yeison Holly MD    Discharging Nurse: ABHISHEK Majano  [address] Unit/Room#: 1420/9942-54  Discharging Unit Phone Number: [phone]    Emergency Contact:   Extended Emergency Contact Information  Primary Emergency Contact: Giovanna Pantoja  Address: [address] Phone: [phone]  Relation: Spouse    Past Surgical History:  Past Surgical History:   Procedure Laterality Date    CYST REMOVAL      POSTERIOR HEAD       Immunization History:   Immunization History   Administered Date(s) Administered    Hepatitis B 2019    Hepatitis B Adult (Engerix-B) 2019    Influenza Vaccine, unspecified formulation 2015    Influenza Virus Vaccine 2015, 2018    Influenza, [place], IM, (6 mo and older Fluzone, Flulaval, Fluarix and 3 yrs and older Afluria) 2018    Influenza, Quadv, IM, PF (6 mo and older Fluzone, Flulaval, Fluarix, and 3 yrs and older Afluria) 2019, 10/14/2021    Pneumococcal Polysaccharide ([id number]) 2015, 2019    Tdap (Boostrix, Adacel) 08/10/2018    Zoster Recombinant (Shingrix) 2019       Active Problems:  Patient Active Problem List   Diagnosis Code    Diabetes mellitus type 2, uncontrolled, with complications ([facility] [address] A30.9, E11.65    Essential hypertension I10    Atypical chest pain R07.89    Need for prophylactic vaccination and inoculation against varicella Z23    Allergic rhinitis J30.9    Pain in right toe(s) M79.674    Foot infection L08.9    Diabetic ulcer of toe of right foot associated with type 2 diabetes mellitus, with fat layer exposed ([address] E11.621, N52.098       Isolation/Infection:   Isolation            No Isolation

## 2021-11-11 NOTE — PROGRESS NOTES
Pharmacy Note  Vancomycin Consult    Malgorzata Taveras is a 46 y.o. male started on Vancomycin for RLE cellulitis; consult received from Dr. Mindi Gregory to manage therapy. Also receiving the following antibiotics: Zosyn. Patient Active Problem List   Diagnosis    Diabetes mellitus type 2, uncontrolled, with complications ([address]    Essential hypertension    Atypical chest pain    Need for prophylactic vaccination and inoculation against varicella    Allergic rhinitis    Pain in right toe(s)    Foot infection     Allergies:  Patient has no known allergies. Temp max: 99.2 F    Recent Labs     11/10/21  1702   BUN 8   CREATININE 0.76   WBC 6.0     No intake or output data in the 24 hours ending 11/10/21 1937  Culture Date      Source                       Results   Pending    Ht Readings from Last 1 Encounters:   07/01/21 6' (1.829 m)        Wt Readings from Last 1 Encounters:   11/10/21 260 lb 3.2 oz (118 kg)       There is no height or weight on file to calculate BMI. Estimated Creatinine Clearance: 149 mL/min (based on SCr of 0.76 mg/dL). Goal Trough Level: 10-20 mcg/mL    Assessment/Plan:  Will initiate Vancomycin with 1500 mg IV every 12 hours. Timing of trough level will be determined based on culture results, renal function, and clinical response. Thank you for the consult. Will continue to follow. ALBERT Wells, PharmD  11/10/2021 7:43 PM

## 2021-11-11 NOTE — CARE COORDINATION
TRansitional Planning    Spoke to patient that CM received phone call that his insurance is OON with this hospital. If he is going home in the next day or so, he does not plan to request transfer.  HC choices are [address], [facility]. and [facility]

## 2021-11-11 NOTE — CARE COORDINATION
Case Management Initial Discharge Plan  [address],             Met with:patient to discuss discharge plans. Information verified: address, contacts, phone number, , insurance Yes  Insurance Provider: [facility][1]    Emergency Contact/Next of Kin name & number: Jay Mckeon [phone]  Who are involved in patient's support system? wife    PCP: Alison Washington MD  Date of last visit: yesterday      Discharge Planning    Living Arrangements:  Spouse/Significant Other, Children, Family Members     Home has 2 stories  4 stairs to climb to get into front door, 1 flightstairs to climb to reach second floor  Location of bedroom/bathroom in home second floor    Patient able to perform ADL's:Independent    Current Services (outpatient & in home) none  DME equipment:  cabe  DME provider:      Is patient receiving oral anticoagulation therapy? No    If indicated:   Physician managing anticoagulation treatment:    Where does patient obtain lab work for ATC treatment? Potential Assistance Needed:  N/A    Patient agreeable to home care: Yes  Freedom of choice provided:  yes    Prior SNF/Rehab Placement and Facility:    Agreeable to SNF/Rehab: No  [place] of choice provided: n/a     Evaluation: no    Expected Discharge date:  21    Patient expects to be discharged to: If home: is the family and/or caregiver wiling & able to provide support at home? yes  Who will be providing this support? wife    Follow Up Appointment: Best Day/ Time: Monday AM    Transportation provider: has a ride  Transportation arrangements needed for discharge: No     Readmission Risk              Risk of Unplanned Readmission:  12             Does patient have a readmission risk score greater than 14?: No  If yes, follow-up appointment must be made within 7 days of discharge.      Goals of Care:       Educated patient on transitional options, provided freedom of choice and are agreeable with plan      Discharge Plan: home with home care, will possibly need IV antibiotics and dressing changes, has a ride, gave [facility]. list, need choices.  Patient and or his wife are teachable          Electronically signed by Nadia Cavanaugh RN on 11/11/21 at 9:48 AM EST
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
## 2021-11-11 NOTE — PROGRESS NOTES
Inpatient Diabetes  Education     Type and Reason for Visit: Patient Education         · Verbally reviewed following information with patient  Survival skills Diagnosis, A1C, Blood glucose targets, hypo and hyperglycemia, importance of home blood glucose monitoring, heathy eating  plate method for CHO control portions, be active as recommended by health care providers, take medications oral and or insulin as directed  · Written educational materials provided  __X_ Educational Booklets as available \" \"Diabetes and you\"  __X_ Be safe with Ryan teaching sheet /  [facility]    Patient states that he was diagnoses with diabetes at age 30. His family is supportive - wife and children. One of his daughter has T1 Diabetes. Lit Rivas sees Dr. Regina Hamlin (endocrinology). He placed a call to his office recently as he noticed that his BS was increasing (patient was recovering from [facility] prior to the diagnosis of his foot ulcer). He states that he had a history of running high in the past (A1c in 11% range) but was able to get his A1c down to 8.1% and did not want to regress. Discussed with patient the likelihood of illness impacting his blood sugar level. Patient expressed relief over having a rationale. Reinforced that he took appropriate action by calling the endocrinologist's office. Lit Rivas has been on an insulin pump for about 18 months. He test his blood sugar at least qid with a glucometer. He is planning with Dr. Sera Lujan to get CGM soon. Eats 3 meals per day and occasional snacks. Avoids sugary beverages. Out patient diabetes education  contact number [email or web address] - [phone] to patient and placed on the discharge summary.       ERIKA OLMOS RN
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
## 2021-11-11 NOTE — PLAN OF CARE
Problem: Falls - Risk of:  Goal: Will remain free from falls  Description: Will remain free from falls  11/11/2021 1815 by Ming Merrill RN  Outcome: Met This Shift     Problem: Falls - Risk of:  Goal: Absence of physical injury  Description: Absence of physical injury  11/11/2021 1815 by Ming Merrill RN  Outcome: Met This Shift     Problem: Pain:  Goal: Pain level will decrease  Description: Pain level will decrease  11/11/2021 1815 by Ming Merrill RN  Outcome: Ongoing     Problem: Pain:  Goal: Control of acute pain  Description: Control of acute pain  11/11/2021 1815 by Ming Merrill RN  Outcome: Ongoing     Problem: Pain:  Goal: Control of chronic pain  Description: Control of chronic pain  11/11/2021 1815 by Ming Merrill RN  Outcome: Ongoing     Problem: Musculor/Skeletal Functional Status  Goal: Highest potential functional level  Outcome: Ongoing

## 2021-11-12 VITALS
DIASTOLIC BLOOD PRESSURE: 77 MMHG | RESPIRATION RATE: 18 BRPM | TEMPERATURE: 98.7 F | BODY MASS INDEX: 35.92 KG/M2 | WEIGHT: 271 LBS | HEART RATE: 92 BPM | OXYGEN SATURATION: 99 % | HEIGHT: 73 IN | SYSTOLIC BLOOD PRESSURE: 129 MMHG

## 2021-11-12 LAB
ABSOLUTE EOS #: 0.05 K/UL (ref 0–0.44)
ABSOLUTE IMMATURE GRANULOCYTE: <0.03 K/UL (ref 0–0.3)
ABSOLUTE LYMPH #: 1.57 K/UL (ref 1.1–3.7)
ABSOLUTE MONO #: 0.97 K/UL (ref 0.1–1.2)
ANION GAP SERPL CALCULATED.3IONS-SCNC: 10 MMOL/L (ref 9–17)
BASOPHILS # BLD: 1 % (ref 0–2)
BASOPHILS ABSOLUTE: 0.04 K/UL (ref 0–0.2)
BUN BLDV-MCNC: 13 MG/DL (ref 6–20)
BUN/CREAT BLD: ABNORMAL (ref 9–20)
CALCIUM SERPL-MCNC: 8.5 MG/DL (ref 8.6–10.4)
CHLORIDE BLD-SCNC: 99 MMOL/L (ref 98–107)
CO2: 26 MMOL/L (ref 20–31)
CREAT SERPL-MCNC: 0.96 MG/DL (ref 0.7–1.2)
DIFFERENTIAL TYPE: ABNORMAL
EKG ATRIAL RATE: 106 BPM
EKG P AXIS: 30 DEGREES
EKG P-R INTERVAL: 146 MS
EKG Q-T INTERVAL: 358 MS
EKG QRS DURATION: 126 MS
EKG QTC CALCULATION (BAZETT): 475 MS
EKG R AXIS: -15 DEGREES
EKG T AXIS: 107 DEGREES
EKG VENTRICULAR RATE: 106 BPM
EOSINOPHILS RELATIVE PERCENT: 1 % (ref 1–4)
GFR AFRICAN AMERICAN: >60 ML/MIN
GFR NON-AFRICAN AMERICAN: >60 ML/MIN
GFR SERPL CREATININE-BSD FRML MDRD: ABNORMAL ML/MIN/{1.73_M2}
GFR SERPL CREATININE-BSD FRML MDRD: ABNORMAL ML/MIN/{1.73_M2}
GLUCOSE BLD-MCNC: 335 MG/DL (ref 75–110)
GLUCOSE BLD-MCNC: 341 MG/DL (ref 70–99)
GLUCOSE BLD-MCNC: 373 MG/DL (ref 75–110)
GLUCOSE BLD-MCNC: 437 MG/DL (ref 75–110)
HCT VFR BLD CALC: 38.1 % (ref 40.7–50.3)
HEMOGLOBIN: 12.3 G/DL (ref 13–17)
IMMATURE GRANULOCYTES: 0 %
LV EF: 53 %
LVEF MODALITY: NORMAL
LYMPHOCYTES # BLD: 23 % (ref 24–43)
MCH RBC QN AUTO: 30.1 PG (ref 25.2–33.5)
MCHC RBC AUTO-ENTMCNC: 32.3 G/DL (ref 28.4–34.8)
MCV RBC AUTO: 93.4 FL (ref 82.6–102.9)
MONOCYTES # BLD: 14 % (ref 3–12)
NRBC AUTOMATED: 0 PER 100 WBC
PDW BLD-RTO: 12.4 % (ref 11.8–14.4)
PLATELET # BLD: 193 K/UL (ref 138–453)
PLATELET ESTIMATE: ABNORMAL
PMV BLD AUTO: 10.5 FL (ref 8.1–13.5)
POTASSIUM SERPL-SCNC: 5.1 MMOL/L (ref 3.7–5.3)
RBC # BLD: 4.08 M/UL (ref 4.21–5.77)
RBC # BLD: ABNORMAL 10*6/UL
SEG NEUTROPHILS: 61 % (ref 36–65)
SEGMENTED NEUTROPHILS ABSOLUTE COUNT: 4.09 K/UL (ref 1.5–8.1)
SODIUM BLD-SCNC: 135 MMOL/L (ref 135–144)
TROPONIN INTERP: NORMAL
TROPONIN T: NORMAL NG/ML
TROPONIN, HIGH SENSITIVITY: 17 NG/L (ref 0–22)
VANCOMYCIN TROUGH DATE LAST DOSE: NORMAL
VANCOMYCIN TROUGH DOSE AMOUNT: NORMAL
VANCOMYCIN TROUGH TIME LAST DOSE: NORMAL
VANCOMYCIN TROUGH: 13.9 UG/ML (ref 10–20)
WBC # BLD: 6.7 K/UL (ref 3.5–11.3)
WBC # BLD: ABNORMAL 10*3/UL

## 2021-11-12 PROCEDURE — 80202 ASSAY OF VANCOMYCIN: CPT

## 2021-11-12 PROCEDURE — 93306 TTE W/DOPPLER COMPLETE: CPT

## 2021-11-12 PROCEDURE — 84484 ASSAY OF TROPONIN QUANT: CPT

## 2021-11-12 PROCEDURE — G0378 HOSPITAL OBSERVATION PER HR: HCPCS

## 2021-11-12 PROCEDURE — 96366 THER/PROPH/DIAG IV INF ADDON: CPT

## 2021-11-12 PROCEDURE — 96368 THER/DIAG CONCURRENT INF: CPT

## 2021-11-12 PROCEDURE — 6370000000 HC RX 637 (ALT 250 FOR IP): Performed by: STUDENT IN AN ORGANIZED HEALTH CARE EDUCATION/TRAINING PROGRAM

## 2021-11-12 PROCEDURE — 6370000000 HC RX 637 (ALT 250 FOR IP)

## 2021-11-12 PROCEDURE — 96372 THER/PROPH/DIAG INJ SC/IM: CPT

## 2021-11-12 PROCEDURE — 82947 ASSAY GLUCOSE BLOOD QUANT: CPT

## 2021-11-12 PROCEDURE — 99217 PR OBSERVATION CARE DISCHARGE MANAGEMENT: CPT | Performed by: HOSPITALIST

## 2021-11-12 PROCEDURE — 93010 ELECTROCARDIOGRAM REPORT: CPT | Performed by: INTERNAL MEDICINE

## 2021-11-12 PROCEDURE — 2580000003 HC RX 258: Performed by: STUDENT IN AN ORGANIZED HEALTH CARE EDUCATION/TRAINING PROGRAM

## 2021-11-12 PROCEDURE — 6360000002 HC RX W HCPCS: Performed by: STUDENT IN AN ORGANIZED HEALTH CARE EDUCATION/TRAINING PROGRAM

## 2021-11-12 PROCEDURE — 85025 COMPLETE CBC W/AUTO DIFF WBC: CPT

## 2021-11-12 PROCEDURE — 36415 COLL VENOUS BLD VENIPUNCTURE: CPT

## 2021-11-12 PROCEDURE — 80048 BASIC METABOLIC PNL TOTAL CA: CPT

## 2021-11-12 RX ORDER — DOXYCYCLINE HYCLATE 100 MG
100 TABLET ORAL EVERY 12 HOURS SCHEDULED
Status: DISCONTINUED | OUTPATIENT
Start: 2021-11-12 | End: 2021-11-12 | Stop reason: HOSPADM

## 2021-11-12 RX ORDER — DOXYCYCLINE HYCLATE 100 MG
100 TABLET ORAL EVERY 12 HOURS SCHEDULED
Qty: 14 TABLET | Refills: 0 | Status: SHIPPED | OUTPATIENT
Start: 2021-11-12 | End: 2021-11-19

## 2021-11-12 RX ORDER — INSULIN GLARGINE 100 [IU]/ML
20 INJECTION, SOLUTION SUBCUTANEOUS 2 TIMES DAILY
Status: DISCONTINUED | OUTPATIENT
Start: 2021-11-12 | End: 2021-11-12 | Stop reason: HOSPADM

## 2021-11-12 RX ORDER — HYDROCODONE BITARTRATE AND ACETAMINOPHEN 5; 325 MG/1; MG/1
1 TABLET ORAL EVERY 6 HOURS PRN
Qty: 28 TABLET | Refills: 0 | Status: SHIPPED | OUTPATIENT
Start: 2021-11-12 | End: 2021-11-19

## 2021-11-12 RX ADMIN — ENOXAPARIN SODIUM 40 MG: 100 INJECTION SUBCUTANEOUS at 08:12

## 2021-11-12 RX ADMIN — PIPERACILLIN AND TAZOBACTAM 3375 MG: 3; .375 INJECTION, POWDER, LYOPHILIZED, FOR SOLUTION INTRAVENOUS at 00:59

## 2021-11-12 RX ADMIN — INSULIN LISPRO 12 UNITS: 100 INJECTION, SOLUTION INTRAVENOUS; SUBCUTANEOUS at 11:45

## 2021-11-12 RX ADMIN — INSULIN GLARGINE 20 UNITS: 100 INJECTION, SOLUTION SUBCUTANEOUS at 09:28

## 2021-11-12 RX ADMIN — LISINOPRIL 40 MG: 20 TABLET ORAL at 08:12

## 2021-11-12 RX ADMIN — INSULIN LISPRO 8 UNITS: 100 INJECTION, SOLUTION INTRAVENOUS; SUBCUTANEOUS at 08:11

## 2021-11-12 RX ADMIN — GABAPENTIN 600 MG: 600 TABLET ORAL at 15:00

## 2021-11-12 RX ADMIN — INSULIN LISPRO 15 UNITS: 100 INJECTION, SOLUTION INTRAVENOUS; SUBCUTANEOUS at 16:04

## 2021-11-12 RX ADMIN — METOPROLOL SUCCINATE 25 MG: 25 TABLET, FILM COATED, EXTENDED RELEASE ORAL at 08:12

## 2021-11-12 RX ADMIN — SODIUM CHLORIDE, PRESERVATIVE FREE 10 ML: 5 INJECTION INTRAVENOUS at 09:28

## 2021-11-12 RX ADMIN — PIPERACILLIN AND TAZOBACTAM 3375 MG: 3; .375 INJECTION, POWDER, LYOPHILIZED, FOR SOLUTION INTRAVENOUS at 09:28

## 2021-11-12 RX ADMIN — Medication 81 MG: at 08:12

## 2021-11-12 RX ADMIN — Medication 1500 MG: at 09:28

## 2021-11-12 RX ADMIN — GABAPENTIN 600 MG: 600 TABLET ORAL at 08:12

## 2021-11-12 RX ADMIN — CETIRIZINE HYDROCHLORIDE 10 MG: 10 TABLET ORAL at 08:12

## 2021-11-12 RX ADMIN — DESMOPRESSIN ACETATE 40 MG: 0.2 TABLET ORAL at 08:12

## 2021-11-12 RX ADMIN — INSULIN LISPRO 10 UNITS: 100 INJECTION, SOLUTION INTRAVENOUS; SUBCUTANEOUS at 11:46

## 2021-11-12 ASSESSMENT — ENCOUNTER SYMPTOMS
SORE THROAT: 0
SHORTNESS OF BREATH: 0
BACK PAIN: 0
VOMITING: 0
NAUSEA: 0
ABDOMINAL PAIN: 0
WHEEZING: 0
COUGH: 1
DIARRHEA: 0
CONSTIPATION: 0
RHINORRHEA: 0

## 2021-11-12 ASSESSMENT — PAIN DESCRIPTION - PAIN TYPE: TYPE: ACUTE PAIN

## 2021-11-12 ASSESSMENT — PAIN SCALES - GENERAL: PAINLEVEL_OUTOF10: 5

## 2021-11-12 ASSESSMENT — PAIN DESCRIPTION - LOCATION: LOCATION: TOE (COMMENT WHICH ONE)

## 2021-11-12 ASSESSMENT — PAIN DESCRIPTION - ORIENTATION: ORIENTATION: RIGHT
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
## 2021-11-12 NOTE — PROGRESS NOTES
Dr. Patricia Jauregui notified via [employer] regarding patients elevated blood sugar of 437. Order was placed for a one time dose of 10 units of humalog administered with his sliding scale dose of humalog.
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
## 2021-11-12 NOTE — PROGRESS NOTES
CLINICAL PHARMACY NOTE: MEDS TO BEDS    Total # of Prescriptions Filled: 2   The following medications were delivered to the patient:  · norco  · doxycycline    Additional Documentation:

## 2021-11-12 NOTE — PROGRESS NOTES
Pharmacy Vancomycin Consult     Vancomycin Day: 3  Current Dosin mg q12 h  Current indication: diabetic foot wound    Temp max:  37.4 C    Recent Labs     21  0453 21  0710   BUN 7 13   CREATININE 0.68* 0.96   WBC 6.5 6.7     Ht Readings from Last 1 Encounters:   11/10/21 6' 1\" (1.854 m)        Wt Readings from Last 1 Encounters:   11/10/21 271 lb (122.9 kg)       Body mass index is 35.75 kg/m². Estimated Creatinine Clearance: 122 mL/min (based on SCr of 0.96 mg/dL). Trough: 13.9, drawn approximately 10 hours post-dose    Assessment/Plan: Estimated AUC on this regimen is 568 using PK software, this is within goal range of 400-600. Trough also within goal range. Will plan to continue current vancomycin dose of 1500 mg q12h.     Roque Xavier, RosyD, STEPHANIE, BCPS 2021 9:41 AM

## 2021-11-12 NOTE — PLAN OF CARE
Problem: Pain:  Goal: Pain level will decrease  Description: Pain level will decrease  11/12/2021 0613 by Chucho Han RN  Outcome: Ongoing     Problem: Pain:  Goal: Control of acute pain  Description: Control of acute pain  11/12/2021 0613 by Chucho Han RN  Outcome: Ongoing     Problem: Falls - Risk of:  Goal: Will remain free from falls  Description: Will remain free from falls  11/12/2021 0613 by Chucho Han RN  Outcome: Ongoing

## 2021-11-12 NOTE — CARE COORDINATION
Transition planning  Call from [facility] Tristan [phone] at [facility] care, unable to accept patient as they are unable to meet patient's needs.

## 2021-11-15 ENCOUNTER — TELEPHONE (OUTPATIENT)
Dept: FAMILY MEDICINE CLINIC | Age: 51
End: 2021-11-15

## 2021-11-15 NOTE — TELEPHONE ENCOUNTER
[facility] [address] Transitions Initial Follow Up Call    Outreach made within 2 business days of discharge: Yes    Patient: Re Martin   Patient : 1970   MRN: [id number]    Reason for Admission: Diabetic ulcers right foot  Discharge Date: 2021       Spoke with: Left HIPAA compliant message identifying self and nature of call, requested call back to writer, phone number given.       Discharge department/facility: [address] [facility]

## 2021-11-17 NOTE — TELEPHONE ENCOUNTER
Left HIPAA compliant message identifying self and nature of call, requested call back to writer, phone number given.

## 2021-11-18 NOTE — PROGRESS NOTES
Physician Progress Note      PATIENT:               Meng Ac  CSN #:                  [id number]  :                       1970  ADMIT DATE:       11/10/2021 4:00 PM  [address] DATE:        2021 6:38 PM  RESPONDING  PROVIDER #:        Sheryl Clark DPM          QUERY TEXT:    Patient admitted with RLE foot wound with blisters Per Consult note 11/10   documentation of debridement to partial thickness blisters. To accurately reflect the procedure performed please document the deepest   depth of tissue removed as down to and including: The medical record reflects the following:  Risk Factors: 46 yr old with DM w/ polyneuropathy  Clinical Indicators: harp excisional debridment w 15 blade on the partial   thickness blisters was done and patient tolerated the procedure well the   wounds are limitied to the breakdown of dermal skin    Treatment: Podiatry consult, Excisional debridement, Silvadene and adaptic   dressing changes    Thank you, please contact me for any questions! Julien Jorgensen RN CDI Supervisor   [phone]  Options provided:  -- Excisional debridement of skin  -- Excisional debridement of subcutaneous tissue  -- Excisional debridement of fascia  -- Other - I will add my own diagnosis  -- Disagree - Not applicable / Not valid  -- Disagree - Clinically unable to determine / Unknown  -- Refer to Clinical Documentation Reviewer    PROVIDER RESPONSE TEXT:    Excisional debridement of subcutaneous tissue of RLE blisters was performed   during procedure on 11/10.     Query created by: Humble Pineda on 2021 3:20 PM      Electronically signed by:  Sheryl Clark DPM 2021 4:25 PM
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
## 2021-11-23 NOTE — TELEPHONE ENCOUNTER
Left HIPAA compliant message identifying self and nature of call, requested call back to writer, phone number given.      Pt has scheduled office visit for 11/29

## 2021-11-29 ENCOUNTER — OFFICE VISIT (OUTPATIENT)
Dept: FAMILY MEDICINE CLINIC | Age: 51
End: 2021-11-29
Payer: MEDICARE

## 2021-11-29 VITALS
DIASTOLIC BLOOD PRESSURE: 94 MMHG | HEART RATE: 87 BPM | SYSTOLIC BLOOD PRESSURE: 148 MMHG | WEIGHT: 268 LBS | BODY MASS INDEX: 35.36 KG/M2

## 2021-11-29 DIAGNOSIS — I10 ESSENTIAL HYPERTENSION: Primary | ICD-10-CM

## 2021-11-29 DIAGNOSIS — Z12.11 SCREEN FOR COLON CANCER: ICD-10-CM

## 2021-11-29 DIAGNOSIS — L97.511 ULCER OF RIGHT FOOT, LIMITED TO BREAKDOWN OF SKIN (HCC): ICD-10-CM

## 2021-11-29 PROCEDURE — 99213 OFFICE O/P EST LOW 20 MIN: CPT | Performed by: STUDENT IN AN ORGANIZED HEALTH CARE EDUCATION/TRAINING PROGRAM

## 2021-11-29 RX ORDER — HYDROCHLOROTHIAZIDE 25 MG/1
25 TABLET ORAL EVERY MORNING
Qty: 30 TABLET | Refills: 0 | Status: CANCELLED | OUTPATIENT
Start: 2021-11-29

## 2021-11-29 RX ORDER — METOPROLOL SUCCINATE 50 MG/1
50 TABLET, EXTENDED RELEASE ORAL DAILY
Qty: 30 TABLET | Refills: 3 | Status: SHIPPED | OUTPATIENT
Start: 2021-11-29 | End: 2022-04-08

## 2021-11-29 SDOH — ECONOMIC STABILITY: FOOD INSECURITY: WITHIN THE PAST 12 MONTHS, THE FOOD YOU BOUGHT JUST DIDN'T LAST AND YOU DIDN'T HAVE MONEY TO GET MORE.: NEVER TRUE

## 2021-11-29 SDOH — ECONOMIC STABILITY: FOOD INSECURITY: WITHIN THE PAST 12 MONTHS, YOU WORRIED THAT YOUR FOOD WOULD RUN OUT BEFORE YOU GOT MONEY TO BUY MORE.: NEVER TRUE

## 2021-11-29 ASSESSMENT — ENCOUNTER SYMPTOMS
ABDOMINAL PAIN: 0
VOMITING: 0
COUGH: 0
SHORTNESS OF BREATH: 0
COLOR CHANGE: 0
NAUSEA: 0
CONSTIPATION: 0
DIARRHEA: 0
CHEST TIGHTNESS: 0
BACK PAIN: 0

## 2021-11-29 ASSESSMENT — SOCIAL DETERMINANTS OF HEALTH (SDOH): HOW HARD IS IT FOR YOU TO PAY FOR THE VERY BASICS LIKE FOOD, HOUSING, MEDICAL CARE, AND HEATING?: NOT VERY HARD

## 2021-11-29 NOTE — PROGRESS NOTES
is not in acute distress. Appearance: Normal appearance. He is not ill-appearing. HENT:      Head: Normocephalic and atraumatic. Mouth/Throat:      Pharynx: Oropharynx is clear. Cardiovascular:      Rate and Rhythm: Normal rate and regular rhythm. Pulses: Normal pulses. Heart sounds: No murmur heard. Pulmonary:      Effort: Pulmonary effort is normal.      Breath sounds: Normal breath sounds. Abdominal:      Palpations: Abdomen is soft. There is no mass. Tenderness: There is no abdominal tenderness. Musculoskeletal:         General: No swelling or tenderness. Normal range of motion. Cervical back: Normal range of motion and neck supple. Neurological:      General: No focal deficit present. Mental Status: He is alert and oriented to person, place, and time. Lab Results   Component Value Date    WBC 6.7 11/12/2021    HGB 12.3 (L) 11/12/2021    HCT 38.1 (L) 11/12/2021     11/12/2021    CHOL 95 02/12/2021    CHOL 95 02/12/2021    TRIG 67 02/12/2021    TRIG 67 02/12/2021    HDL 36 (L) 02/12/2021    HDL 36 (L) 02/12/2021    ALT 27 02/12/2021    ALT 27 02/12/2021    AST 26 02/12/2021    AST 26 02/12/2021     11/12/2021    K 5.1 11/12/2021    CL 99 11/12/2021    CREATININE 0.96 11/12/2021    BUN 13 11/12/2021    CO2 26 11/12/2021    LABA1C 11.6 (H) 01/29/2019    LABMICR 32 (H) 02/12/2021     Lab Results   Component Value Date    CALCIUM 8.5 (L) 11/12/2021     Lab Results   Component Value Date    LDLCHOLESTEROL 46 02/12/2021    LDLCHOLESTEROL 46 02/12/2021       Assessment and Plan:    1. Essential hypertension  Blood pressure today is 148/94  We will increase Toprol from 25 to 50 mg daily  Counseled patient about diet, exercise    - metoprolol succinate (TOPROL XL) 50 MG extended release tablet; Take 1 tablet by mouth daily  Dispense: 30 tablet; Refill: 3    2.  Ulcer of right foot, limited to breakdown of skin [facility])  Patient follows up with podiatry and wound care  Patient is scheduled to follow-up with vascular surgery for arterial Doppler next week    3. Diabetes mellitus type 2, uncontrolled, with complications ([facility] [address]  Patient follows up with Dr. Isaias Colindres. He is currently on insulin pump    4. Screen for colon cancer    - [place] (For External Results Only); Future          Requested Prescriptions     Signed Prescriptions Disp Refills    metoprolol succinate (TOPROL XL) 50 MG extended release tablet 30 tablet 3     Sig: Take 1 tablet by mouth daily       Medications Discontinued During This Encounter   Medication Reason    metoprolol succinate (TOPROL XL) 25 MG extended release tablet DOSE ADJUSTMENT       Raul received counseling on the following healthy behaviors: nutrition, exercise and medication adherence    Discussed use,benefit, and side effects of prescribed medications. Barriers to medication compliance addressed. All patient questions answered. Pt voiced understanding. Return in about 1 month (around 12/29/2021) for follow up, HTN. Disclaimer: Some orall of this note was transcribed using voice-recognition software. This may cause typographical errors occasionally. Although all effort is made to fix these errors, please do not hesitate to contact our office if there Chris Cristina concern with the understanding of this note.

## 2021-12-01 DIAGNOSIS — R07.89 ATYPICAL CHEST PAIN: ICD-10-CM

## 2021-12-01 NOTE — TELEPHONE ENCOUNTER
aGby Request for pending medication. Last Visit Date: 11/29/21  Next Visit Date:  Future Appointments   Date Time Provider Kaley Smithisti   1/3/2022  2:30 PM Azalea Osorio MD [address] [place] [address] Maintenance   Topic Date Due    Diabetic retinal exam  Never done    COVID-19 Vaccine (1) Never done    Colon cancer screen colonoscopy  Never done    Hepatitis B vaccine (2 of 3 - Risk 3-dose series) 05/10/2019    Shingles Vaccine (2 of 2) 02/01/2020    Annual Wellness Visit (AWV)  Never done    A1C test (Diabetic or Prediabetic)  11/09/2021    Diabetic microalbuminuria test  02/12/2022    Lipid screen  02/12/2022    Diabetic foot exam  11/10/2022    Potassium monitoring  11/12/2022    Creatinine monitoring  11/12/2022    DTaP/Tdap/Td vaccine (2 - Td or Tdap) 08/10/2028    Pneumococcal 0-64 years Vaccine (2 of 2 - PPSV23) 01/13/2033    Flu vaccine  Completed    Hepatitis C screen  Completed    HIV screen  Completed    Hepatitis A vaccine  Aged Out    Hib vaccine  Aged Out    Meningococcal (ACWY) vaccine  Aged Out       Hemoglobin A1C (%)   Date Value   01/29/2019 11.6 (H)   11/21/2018 12.5   08/10/2018 11.8             ( goal A1C is < 7)   Microalb/Crt.  Ratio (mcg/mg creat)   Date Value   02/12/2021 32 (H)     LDL Cholesterol (mg/dL)   Date Value   02/12/2021 46   02/12/2021 46       (goal LDL is <100)   AST (U/L)   Date Value   02/12/2021 26   02/12/2021 26     ALT (U/L)   Date Value   02/12/2021 27   02/12/2021 27     BUN (mg/dL)   Date Value   11/12/2021 13     BP Readings from Last 3 Encounters:   11/29/21 (!) 148/94   11/12/21 129/77   11/10/21 139/87          (goal 120/80)    All Future Testing planned in [facility]  Lab Frequency Next Occurrence   Cologuard Once 02/11/2022   ECHO Complete 2D W Doppler W Color Once 02/12/2021   NM MYOCARDIAL SPECT REST EXERCISE OR RX Once 10/14/2022   Cologuard (For External Results Only) Once 11/29/2021       Next Visit Date:  Future Appointments Date Time Provider Kaley Damaris   1/3/2022  2:30 PM Maddie Garza MD [address] [facility]         Patient Active Problem List:     Diabetes mellitus type 2, uncontrolled, with complications [facility])     Essential hypertension     Atypical chest pain     Screen for colon cancer     Need for prophylactic vaccination and inoculation against varicella     Allergic rhinitis     Pain in right toe(s)     Foot infection     Diabetic ulcer of toe of right foot associated with type 2 diabetes mellitus, with fat layer exposed ([address]     Ulcer of foot due to secondary diabetes ([address]     Ulcer of right foot, limited to breakdown of skin ([address]

## 2021-12-02 RX ORDER — ASPIRIN 81 MG/1
TABLET ORAL
Qty: 30 TABLET | Refills: 5 | Status: SHIPPED | OUTPATIENT
Start: 2021-12-02 | End: 2022-05-20 | Stop reason: SDUPTHER

## 2021-12-21 RX ORDER — ATORVASTATIN CALCIUM 40 MG/1
TABLET, FILM COATED ORAL
Qty: 30 TABLET | Refills: 3 | Status: SHIPPED | OUTPATIENT
Start: 2021-12-21 | End: 2022-05-20 | Stop reason: SDUPTHER

## 2021-12-21 NOTE — TELEPHONE ENCOUNTER
Atorvastatin pending for refill     Health Maintenance   Topic Date Due    COVID-19 Vaccine (1) Never done    Diabetic retinal exam  Never done    Colon cancer screen colonoscopy  Never done    Hepatitis B vaccine (2 of 3 - Risk 3-dose series) 05/10/2019    Shingles Vaccine (2 of 2) 02/01/2020    Annual Wellness Visit (AWV)  Never done    A1C test (Diabetic or Prediabetic)  11/09/2021    Diabetic microalbuminuria test  02/12/2022    Lipid screen  02/12/2022    Diabetic foot exam  11/10/2022    Potassium monitoring  11/12/2022    Creatinine monitoring  11/12/2022    DTaP/Tdap/Td vaccine (2 - Td or Tdap) 08/10/2028    Pneumococcal 0-64 years Vaccine (2 of 2 - PPSV23) 01/13/2033    Flu vaccine  Completed    Hepatitis C screen  Completed    HIV screen  Completed    Hepatitis A vaccine  Aged Out    Hib vaccine  Aged Out    Meningococcal (ACWY) vaccine  Aged Out             (applicable per patient's age: Cancer Screenings, Depression Screening, Fall Risk Screening, Immunizations)    Hemoglobin A1C (%)   Date Value   01/29/2019 11.6 (H)   11/21/2018 12.5   08/10/2018 11.8     Microalb/Crt.  Ratio (mcg/mg creat)   Date Value   02/12/2021 32 (H)     LDL Cholesterol (mg/dL)   Date Value   02/12/2021 46   02/12/2021 46     AST (U/L)   Date Value   02/12/2021 26   02/12/2021 26     ALT (U/L)   Date Value   02/12/2021 27   02/12/2021 27     BUN (mg/dL)   Date Value   11/12/2021 13      (goal A1C is < 7)   (goal LDL is <100) need 30-50% reduction from baseline     BP Readings from Last 3 Encounters:   11/29/21 (!) 148/94   11/12/21 129/77   11/10/21 139/87    (goal /80)      All Future Testing planned in [facility]:  Lab Frequency Next Occurrence   Cologuard Once 02/11/2022   ECHO Complete 2D W Doppler W Color Once 02/12/2021   NM MYOCARDIAL SPECT REST EXERCISE OR RX Once 10/14/2022   Cologuard (For External Results Only) Once 11/29/2022       Next Visit Date:  Future Appointments   Date Time Provider Kaley Damaris   1/3/2022  2:30 PM Silvia Mccarty MD [address] [facility]            Patient Active Problem List:     Diabetes mellitus type 2, uncontrolled, with complications [facility])     Essential hypertension     Atypical chest pain     Screen for colon cancer     Need for prophylactic vaccination and inoculation against varicella     Allergic rhinitis     Pain in right toe(s)     Foot infection     Diabetic ulcer of toe of right foot associated with type 2 diabetes mellitus, with fat layer exposed ([address]     Ulcer of foot due to secondary diabetes ([address]     Ulcer of right foot, limited to breakdown of skin ([address]

## 2021-12-27 ENCOUNTER — OFFICE VISIT (OUTPATIENT)
Dept: FAMILY MEDICINE CLINIC | Age: 51
End: 2021-12-27
Payer: MEDICARE

## 2021-12-27 VITALS
DIASTOLIC BLOOD PRESSURE: 94 MMHG | HEART RATE: 89 BPM | SYSTOLIC BLOOD PRESSURE: 154 MMHG | WEIGHT: 270 LBS | BODY MASS INDEX: 35.62 KG/M2

## 2021-12-27 DIAGNOSIS — R07.1 CHEST PAIN ON BREATHING: ICD-10-CM

## 2021-12-27 DIAGNOSIS — L97.512 DIABETIC ULCER OF TOE OF RIGHT FOOT ASSOCIATED WITH TYPE 2 DIABETES MELLITUS, WITH FAT LAYER EXPOSED (HCC): ICD-10-CM

## 2021-12-27 DIAGNOSIS — I10 ESSENTIAL HYPERTENSION: Primary | ICD-10-CM

## 2021-12-27 DIAGNOSIS — E11.621 DIABETIC ULCER OF TOE OF RIGHT FOOT ASSOCIATED WITH TYPE 2 DIABETES MELLITUS, WITH FAT LAYER EXPOSED (HCC): ICD-10-CM

## 2021-12-27 PROCEDURE — 99213 OFFICE O/P EST LOW 20 MIN: CPT | Performed by: STUDENT IN AN ORGANIZED HEALTH CARE EDUCATION/TRAINING PROGRAM

## 2021-12-27 ASSESSMENT — ENCOUNTER SYMPTOMS
COUGH: 0
SHORTNESS OF BREATH: 0
CHEST TIGHTNESS: 0
COLOR CHANGE: 0
APNEA: 0

## 2021-12-27 NOTE — PROGRESS NOTES
Visit Information    Have you changed or started any medications since your last visit including any over-the-counter medicines, vitamins, or herbal medicines? no   Are you having any side effects from any of your medications? -  no  Have you stopped taking any of your medications? Is so, why? -  no    Have you seen any other physician or provider since your last visit? No  Have you had any other diagnostic tests since your last visit? No  Have you been seen in the emergency room and/or had an admission to a hospital since we last saw you? No  Have you had your routine dental cleaning in the past 6 months? no    Have you activated your [employer] account? If not, what are your barriers?  No:      Patient Care Team:  Lissett Helm MD as PCP - General (Family Medicine)    Medical History Review  Past Medical, Family, and Social History reviewed and does not contribute to the patient presenting condition    Health Maintenance   Topic Date Due    COVID-19 Vaccine (1) Never done    Diabetic retinal exam  Never done    Colon cancer screen colonoscopy  Never done    Hepatitis B vaccine (2 of 3 - Risk 3-dose series) 05/10/2019    Shingles Vaccine (2 of 2) 02/01/2020    Annual Wellness Visit (AWV)  Never done    A1C test (Diabetic or Prediabetic)  11/09/2021    Diabetic microalbuminuria test  02/12/2022    Lipid screen  02/12/2022    Diabetic foot exam  11/10/2022    Potassium monitoring  11/12/2022    Creatinine monitoring  11/12/2022    DTaP/Tdap/Td vaccine (2 - Td or Tdap) 08/10/2028    Pneumococcal 0-64 years Vaccine (2 of 2 - PPSV23) 01/13/2033    Flu vaccine  Completed    Hepatitis C screen  Completed    HIV screen  Completed    Hepatitis A vaccine  Aged Out    Hib vaccine  Aged Out    Meningococcal (ACWY) vaccine  Aged Out

## 2021-12-29 PROBLEM — Z12.11 SCREEN FOR COLON CANCER: Status: RESOLVED | Noted: 2019-10-11 | Resolved: 2021-12-29

## 2022-01-06 ENCOUNTER — TELEPHONE (OUTPATIENT)
Dept: FAMILY MEDICINE CLINIC | Age: 52
End: 2022-01-06

## 2022-01-06 NOTE — TELEPHONE ENCOUNTER
----- Message from Reyesolive Fatima sent at 1/6/2022 11:22 AM EST -----  Subject: Message to Provider    QUESTIONS  Information for Provider? Pt called in and said that since the pt has a   boot on his foot there is a \"liquid\" that they can use for him instead of   the actually EXERCISE for the stress test, but he would need another order   written out for it fax number [phone] please follow up   ---------------------------------------------------------------------------  --------------  [address]  What is the best way for the office to contact you? OK to leave message on   voicemail  Preferred Call Back Phone Number? [phone]  ---------------------------------------------------------------------------  --------------  SCRIPT ANSWERS  Relationship to Patient?  Self

## 2022-03-13 DIAGNOSIS — J30.9 ALLERGIC RHINITIS, UNSPECIFIED SEASONALITY, UNSPECIFIED TRIGGER: ICD-10-CM

## 2022-03-14 RX ORDER — CETIRIZINE HYDROCHLORIDE 10 MG/1
TABLET ORAL
Qty: 30 TABLET | Refills: 3 | Status: SHIPPED | OUTPATIENT
Start: 2022-03-14 | End: 2022-09-15

## 2022-03-14 NOTE — TELEPHONE ENCOUNTER
E-scribe request for med refills. Please review and e-scribe if applicable. Last Visit Date:  12/27/21  Next Visit Date:  Visit date not found    Hemoglobin A1C (%)   Date Value   01/29/2019 11.6 (H)   11/21/2018 12.5   08/10/2018 11.8             ( goal A1C is < 7)   Microalb/Crt.  Ratio (mcg/mg creat)   Date Value   02/12/2021 32 (H)     LDL Cholesterol (mg/dL)   Date Value   02/12/2021 46   02/12/2021 46       (goal LDL is <100)   AST (U/L)   Date Value   02/12/2021 26   02/12/2021 26     ALT (U/L)   Date Value   02/12/2021 27   02/12/2021 27     BUN (mg/dL)   Date Value   11/12/2021 13     BP Readings from Last 3 Encounters:   12/27/21 (!) 154/94   11/29/21 (!) 148/94   11/12/21 129/77          (goal 120/80)        Patient Active Problem List:     Diabetes mellitus type 2, uncontrolled, with complications ([address]     Essential hypertension     Atypical chest pain     Need for prophylactic vaccination and inoculation against varicella     Allergic rhinitis     Pain in right toe(s)     Foot infection     Diabetic ulcer of toe of right foot associated with type 2 diabetes mellitus, with fat layer exposed ([address]     Ulcer of foot due to secondary diabetes ([address]     Ulcer of right foot, limited to breakdown of skin ([address]     Chest pain on breathing      ----Oneil Donnelly

## 2022-03-24 DIAGNOSIS — R10.13 DYSPEPSIA: ICD-10-CM

## 2022-03-24 RX ORDER — FAMOTIDINE 40 MG/1
TABLET, FILM COATED ORAL
Qty: 30 TABLET | Refills: 2 | Status: SHIPPED | OUTPATIENT
Start: 2022-03-24 | End: 2022-05-25

## 2022-03-24 NOTE — TELEPHONE ENCOUNTER
E-scribe request for med refill. Please review and e-scribe if applicable. Last Visit Date:  12/27/2021  Next Visit Date:  Visit date not found    Hemoglobin A1C (%)   Date Value   01/29/2019 11.6 (H)   11/21/2018 12.5   08/10/2018 11.8             ( goal A1C is < 7)   Microalb/Crt.  Ratio (mcg/mg creat)   Date Value   02/12/2021 32 (H)     LDL Cholesterol (mg/dL)   Date Value   02/12/2021 46   02/12/2021 46       (goal LDL is <100)   AST (U/L)   Date Value   02/12/2021 26   02/12/2021 26     ALT (U/L)   Date Value   02/12/2021 27   02/12/2021 27     BUN (mg/dL)   Date Value   11/12/2021 13     BP Readings from Last 3 Encounters:   12/27/21 (!) 154/94   11/29/21 (!) 148/94   11/12/21 129/77          (goal 120/80)        Patient Active Problem List:     Diabetes mellitus type 2, uncontrolled, with complications ([address]     Essential hypertension     Atypical chest pain     Need for prophylactic vaccination and inoculation against varicella     Allergic rhinitis     Pain in right toe(s)     Foot infection     Diabetic ulcer of toe of right foot associated with type 2 diabetes mellitus, with fat layer exposed ([address]     Ulcer of foot due to secondary diabetes ([address]     Ulcer of right foot, limited to breakdown of skin ([address]     Chest pain on breathing      ----Estonian Pew

## 2022-04-05 DIAGNOSIS — G62.9 NEUROPATHY: ICD-10-CM

## 2022-04-05 RX ORDER — GABAPENTIN 600 MG/1
TABLET ORAL
Qty: 90 TABLET | Refills: 3 | OUTPATIENT
Start: 2022-04-05

## 2022-04-08 DIAGNOSIS — I10 ESSENTIAL HYPERTENSION: ICD-10-CM

## 2022-04-08 RX ORDER — METOPROLOL SUCCINATE 50 MG/1
TABLET, EXTENDED RELEASE ORAL
Qty: 30 TABLET | Refills: 3 | Status: SHIPPED | OUTPATIENT
Start: 2022-04-08 | End: 2022-05-20 | Stop reason: SDUPTHER

## 2022-04-08 NOTE — TELEPHONE ENCOUNTER
E-scribe request for med refills. Please review and e-scribe if applicable. Last Visit Date:  12/27/21  Next Visit Date:  Visit date not found    Hemoglobin A1C (%)   Date Value   01/29/2019 11.6 (H)   11/21/2018 12.5   08/10/2018 11.8             ( goal A1C is < 7)   Microalb/Crt.  Ratio (mcg/mg creat)   Date Value   02/12/2021 32 (H)     LDL Cholesterol (mg/dL)   Date Value   02/12/2021 46   02/12/2021 46       (goal LDL is <100)   AST (U/L)   Date Value   02/12/2021 26   02/12/2021 26     ALT (U/L)   Date Value   02/12/2021 27   02/12/2021 27     BUN (mg/dL)   Date Value   11/12/2021 13     BP Readings from Last 3 Encounters:   12/27/21 (!) 154/94   11/29/21 (!) 148/94   11/12/21 129/77          (goal 120/80)        Patient Active Problem List:     Diabetes mellitus type 2, uncontrolled, with complications ([address]     Essential hypertension     Atypical chest pain     Need for prophylactic vaccination and inoculation against varicella     Allergic rhinitis     Pain in right toe(s)     Foot infection     Diabetic ulcer of toe of right foot associated with type 2 diabetes mellitus, with fat layer exposed ([address]     Ulcer of foot due to secondary diabetes ([address]     Ulcer of right foot, limited to breakdown of skin ([address]     Chest pain on breathing      ----Tessa Christopher

## 2022-04-23 DIAGNOSIS — I10 ESSENTIAL HYPERTENSION: ICD-10-CM

## 2022-04-23 DIAGNOSIS — R10.13 DYSPEPSIA: ICD-10-CM

## 2022-04-25 RX ORDER — LISINOPRIL 40 MG/1
TABLET ORAL
Qty: 30 TABLET | Refills: 5 | Status: SHIPPED | OUTPATIENT
Start: 2022-04-25 | End: 2022-04-29

## 2022-04-25 RX ORDER — FAMOTIDINE 40 MG/1
TABLET, FILM COATED ORAL
Qty: 30 TABLET | Refills: 2 | OUTPATIENT
Start: 2022-04-25

## 2022-04-25 NOTE — TELEPHONE ENCOUNTER
E-scribe request for med refills. Please review and e-scribe if applicable. Last Visit Date:  12/27/21  Next Visit Date:  4/28/2022    Hemoglobin A1C (%)   Date Value   01/29/2019 11.6 (H)   11/21/2018 12.5   08/10/2018 11.8             ( goal A1C is < 7)   Microalb/Crt.  Ratio (mcg/mg creat)   Date Value   02/12/2021 32 (H)     LDL Cholesterol (mg/dL)   Date Value   02/12/2021 46   02/12/2021 46       (goal LDL is <100)   AST (U/L)   Date Value   02/12/2021 26   02/12/2021 26     ALT (U/L)   Date Value   02/12/2021 27   02/12/2021 27     BUN (mg/dL)   Date Value   11/12/2021 13     BP Readings from Last 3 Encounters:   12/27/21 (!) 154/94   11/29/21 (!) 148/94   11/12/21 129/77          (goal 120/80)        Patient Active Problem List:     Diabetes mellitus type 2, uncontrolled, with complications ([address]     Essential hypertension     Atypical chest pain     Need for prophylactic vaccination and inoculation against varicella     Allergic rhinitis     Pain in right toe(s)     Foot infection     Diabetic ulcer of toe of right foot associated with type 2 diabetes mellitus, with fat layer exposed ([address]     Ulcer of foot due to secondary diabetes ([address]     Ulcer of right foot, limited to breakdown of skin ([address]     Chest pain on breathing      ----Delmy White

## 2022-04-25 NOTE — TELEPHONE ENCOUNTER
E-scribe request for med refills. Please review and e-scribe if applicable. Last Visit Date:  12/27/21  Next Visit Date:  4/23/2022    Hemoglobin A1C (%)   Date Value   01/29/2019 11.6 (H)   11/21/2018 12.5   08/10/2018 11.8             ( goal A1C is < 7)   Microalb/Crt.  Ratio (mcg/mg creat)   Date Value   02/12/2021 32 (H)     LDL Cholesterol (mg/dL)   Date Value   02/12/2021 46   02/12/2021 46       (goal LDL is <100)   AST (U/L)   Date Value   02/12/2021 26   02/12/2021 26     ALT (U/L)   Date Value   02/12/2021 27   02/12/2021 27     BUN (mg/dL)   Date Value   11/12/2021 13     BP Readings from Last 3 Encounters:   12/27/21 (!) 154/94   11/29/21 (!) 148/94   11/12/21 129/77          (goal 120/80)        Patient Active Problem List:     Diabetes mellitus type 2, uncontrolled, with complications ([address]     Essential hypertension     Atypical chest pain     Need for prophylactic vaccination and inoculation against varicella     Allergic rhinitis     Pain in right toe(s)     Foot infection     Diabetic ulcer of toe of right foot associated with type 2 diabetes mellitus, with fat layer exposed ([address]     Ulcer of foot due to secondary diabetes ([address]     Ulcer of right foot, limited to breakdown of skin ([address]     Chest pain on breathing      ----Ivon Boom

## 2022-04-29 DIAGNOSIS — I10 ESSENTIAL HYPERTENSION: ICD-10-CM

## 2022-04-29 DIAGNOSIS — R10.13 DYSPEPSIA: ICD-10-CM

## 2022-04-29 RX ORDER — LISINOPRIL 40 MG/1
TABLET ORAL
Qty: 30 TABLET | Refills: 5 | Status: SHIPPED | OUTPATIENT
Start: 2022-04-29 | End: 2022-05-20 | Stop reason: SDUPTHER

## 2022-04-29 RX ORDER — FAMOTIDINE 40 MG/1
TABLET, FILM COATED ORAL
Qty: 30 TABLET | Refills: 2 | OUTPATIENT
Start: 2022-04-29

## 2022-04-29 NOTE — TELEPHONE ENCOUNTER
Please address the medication refill and close the encounter. If I can be of assistance, please route to the applicable pool. Thank you. Last visit: 12/27/2021  Last Med refill: 4/11/22  Does patient have enough medication for 72 hours: No:     Next Visit Date:  Future Appointments   Date Time Provider Kaley Ocampo   5/20/2022 10:30 AM Michael Prado MD [address] [place] [address] Maintenance   Topic Date Due    Annual Wellness Visit (AWV)  Never done    COVID-19 Vaccine (1) Never done    Diabetic retinal exam  Never done    Colorectal Cancer Screen  Never done    Hepatitis B vaccine (2 of 3 - Risk 3-dose series) 05/10/2019    Shingles vaccine (2 of 2) 02/01/2020    Pneumococcal 0-64 years Vaccine (2 - PCV) 04/12/2020    A1C test (Diabetic or Prediabetic)  11/09/2021    Depression Screen  02/11/2022    Diabetic microalbuminuria test  02/12/2022    Lipids  02/12/2022    Diabetic foot exam  11/10/2022    Potassium  11/12/2022    Creatinine  11/12/2022    DTaP/Tdap/Td vaccine (2 - Td or Tdap) 08/10/2028    Flu vaccine  Completed    Hepatitis C screen  Completed    HIV screen  Completed    Hepatitis A vaccine  Aged Out    Hib vaccine  Aged Out    Meningococcal (ACWY) vaccine  Aged Out       Hemoglobin A1C (%)   Date Value   01/29/2019 11.6 (H)   11/21/2018 12.5   08/10/2018 11.8             ( goal A1C is < 7)   Microalb/Crt.  Ratio (mcg/mg creat)   Date Value   02/12/2021 32 (H)     LDL Cholesterol (mg/dL)   Date Value   02/12/2021 46   02/12/2021 46       (goal LDL is <100)   AST (U/L)   Date Value   02/12/2021 26   02/12/2021 26     ALT (U/L)   Date Value   02/12/2021 27   02/12/2021 27     BUN (mg/dL)   Date Value   11/12/2021 13     BP Readings from Last 3 Encounters:   12/27/21 (!) 154/94   11/29/21 (!) 148/94   11/12/21 129/77          (goal 120/80)    All Future Testing planned in [facility]  Lab Frequency Next Occurrence   NM MYOCARDIAL SPECT REST EXERCISE OR RX Once 10/14/2022   Cologuard (For External Results Only) Once 11/29/2022   NM MYOCARDIAL SPECT REST EXERCISE OR RX Once 12/27/2022               Patient Active Problem List:     Diabetes mellitus type 2, uncontrolled, with complications ([address]     Essential hypertension     Atypical chest pain     Need for prophylactic vaccination and inoculation against varicella     Allergic rhinitis     Pain in right toe(s)     Foot infection     Diabetic ulcer of toe of right foot associated with type 2 diabetes mellitus, with fat layer exposed ([address]     Ulcer of foot due to secondary diabetes ([address]     Ulcer of right foot, limited to breakdown of skin ([address]     Chest pain on breathing

## 2022-05-20 ENCOUNTER — OFFICE VISIT (OUTPATIENT)
Dept: FAMILY MEDICINE CLINIC | Age: 52
End: 2022-05-20
Payer: MEDICARE

## 2022-05-20 VITALS
BODY MASS INDEX: 35.84 KG/M2 | SYSTOLIC BLOOD PRESSURE: 126 MMHG | HEIGHT: 73 IN | HEART RATE: 93 BPM | WEIGHT: 270.4 LBS | TEMPERATURE: 97.2 F | DIASTOLIC BLOOD PRESSURE: 88 MMHG

## 2022-05-20 DIAGNOSIS — Z79.4 TYPE 2 DIABETES MELLITUS WITH COMPLICATION, WITH LONG-TERM CURRENT USE OF INSULIN (HCC): ICD-10-CM

## 2022-05-20 DIAGNOSIS — G62.9 NEUROPATHY: ICD-10-CM

## 2022-05-20 DIAGNOSIS — I10 ESSENTIAL HYPERTENSION: Primary | ICD-10-CM

## 2022-05-20 DIAGNOSIS — E11.8 TYPE 2 DIABETES MELLITUS WITH COMPLICATION, WITH LONG-TERM CURRENT USE OF INSULIN (HCC): ICD-10-CM

## 2022-05-20 DIAGNOSIS — E78.5 HYPERLIPIDEMIA, UNSPECIFIED HYPERLIPIDEMIA TYPE: ICD-10-CM

## 2022-05-20 LAB — HBA1C MFR BLD: 11.4 %

## 2022-05-20 PROCEDURE — G8417 CALC BMI ABV UP PARAM F/U: HCPCS | Performed by: STUDENT IN AN ORGANIZED HEALTH CARE EDUCATION/TRAINING PROGRAM

## 2022-05-20 PROCEDURE — G8427 DOCREV CUR MEDS BY ELIG CLIN: HCPCS | Performed by: STUDENT IN AN ORGANIZED HEALTH CARE EDUCATION/TRAINING PROGRAM

## 2022-05-20 PROCEDURE — 99213 OFFICE O/P EST LOW 20 MIN: CPT | Performed by: STUDENT IN AN ORGANIZED HEALTH CARE EDUCATION/TRAINING PROGRAM

## 2022-05-20 PROCEDURE — 3017F COLORECTAL CA SCREEN DOC REV: CPT | Performed by: STUDENT IN AN ORGANIZED HEALTH CARE EDUCATION/TRAINING PROGRAM

## 2022-05-20 PROCEDURE — 2022F DILAT RTA XM EVC RTNOPTHY: CPT | Performed by: STUDENT IN AN ORGANIZED HEALTH CARE EDUCATION/TRAINING PROGRAM

## 2022-05-20 PROCEDURE — 3046F HEMOGLOBIN A1C LEVEL >9.0%: CPT | Performed by: STUDENT IN AN ORGANIZED HEALTH CARE EDUCATION/TRAINING PROGRAM

## 2022-05-20 PROCEDURE — 1036F TOBACCO NON-USER: CPT | Performed by: STUDENT IN AN ORGANIZED HEALTH CARE EDUCATION/TRAINING PROGRAM

## 2022-05-20 PROCEDURE — 83036 HEMOGLOBIN GLYCOSYLATED A1C: CPT | Performed by: STUDENT IN AN ORGANIZED HEALTH CARE EDUCATION/TRAINING PROGRAM

## 2022-05-20 RX ORDER — GABAPENTIN 600 MG/1
600 TABLET ORAL 3 TIMES DAILY
Qty: 90 TABLET | Refills: 3 | Status: SHIPPED | OUTPATIENT
Start: 2022-05-20 | End: 2022-09-15 | Stop reason: SDUPTHER

## 2022-05-20 RX ORDER — ASPIRIN 81 MG/1
TABLET ORAL
Qty: 60 TABLET | Refills: 3 | Status: SHIPPED | OUTPATIENT
Start: 2022-05-20 | End: 2022-09-15 | Stop reason: SDUPTHER

## 2022-05-20 RX ORDER — LISINOPRIL 40 MG/1
40 TABLET ORAL DAILY
Qty: 60 TABLET | Refills: 3 | Status: SHIPPED | OUTPATIENT
Start: 2022-05-20 | End: 2022-08-13 | Stop reason: SDUPTHER

## 2022-05-20 RX ORDER — ATORVASTATIN CALCIUM 40 MG/1
40 TABLET, FILM COATED ORAL DAILY
Qty: 60 TABLET | Refills: 3 | Status: SHIPPED | OUTPATIENT
Start: 2022-05-20 | End: 2022-08-13 | Stop reason: SDUPTHER

## 2022-05-20 RX ORDER — METOPROLOL SUCCINATE 50 MG/1
50 TABLET, EXTENDED RELEASE ORAL DAILY
Qty: 60 TABLET | Refills: 3 | Status: SHIPPED | OUTPATIENT
Start: 2022-05-20 | End: 2022-09-15 | Stop reason: SDUPTHER

## 2022-05-20 ASSESSMENT — PATIENT HEALTH QUESTIONNAIRE - PHQ9
SUM OF ALL RESPONSES TO PHQ QUESTIONS 1-9: 0
SUM OF ALL RESPONSES TO PHQ QUESTIONS 1-9: 0
2. FEELING DOWN, DEPRESSED OR HOPELESS: 0
SUM OF ALL RESPONSES TO PHQ QUESTIONS 1-9: 0
SUM OF ALL RESPONSES TO PHQ9 QUESTIONS 1 & 2: 0
SUM OF ALL RESPONSES TO PHQ QUESTIONS 1-9: 0
1. LITTLE INTEREST OR PLEASURE IN DOING THINGS: 0

## 2022-05-20 ASSESSMENT — ENCOUNTER SYMPTOMS
CONSTIPATION: 0
NAUSEA: 0
ABDOMINAL PAIN: 0
CHEST TIGHTNESS: 0
SHORTNESS OF BREATH: 0
BACK PAIN: 0
COUGH: 0
VOMITING: 0
COLOR CHANGE: 0
DIARRHEA: 0

## 2022-05-20 NOTE — PATIENT INSTRUCTIONS
Thank you for letting us take care of you today. We hope all your questions were addressed. If a question was overlooked or something else comes to mind after you return home, please contact a member of your Care Team listed below. Your Care Team at [place] [address] is Team #1  Wing Emmy MD (Faculty)  Edin Latham MD(Resident)  Trisha Alford MD (Resident)  Eloisa Weber., SWATI [place]., Zuleima Gonzales., Thais [facility] office)  [facility], [address] (Clinical Practice Manager)  Sohail Condon [facility] (Clinical Pharmacist)     Office phone number: [phone]    If you need to get in right away due to illness, please be advised we have \"Same Day\" appointments available Monday-Friday. Please call us at [phone] option #3 to schedule your \"Same Day\" appointment.

## 2022-05-20 NOTE — PROGRESS NOTES
Subjective:    Irene Santiago is a 45 y.o. male with  has a past medical history of Cervical disc disease and Diabetes mellitus ([address] Presented to the office today for:  Chief Complaint   Patient presents with    Diabetes     follow up - patient stated his sugar was 36    Hypertension     follow up    Referral - General     GI referral wanted for heartburn        HPI    46 Y  O M with PMHx of DM presents for follow up hypertension. /94, on lisinopril 40mg QD and toprol XL 25 mg QD. compliant with medications. Follows with Dr. Anum Olvera. this am blood sugar 223. on insulin pump. HbA1c 11 today. Denies smokng, alcohol or drug use. Follow up with the podiatry for ulcers. Has ophthalmo appointment on Monday. Review of Systems   Constitutional: Negative for activity change, appetite change, chills, fatigue and fever. HENT: Negative. Respiratory: Negative for cough, chest tightness and shortness of breath. Cardiovascular: Negative for chest pain, palpitations and leg swelling. Gastrointestinal: Negative for abdominal pain, constipation, diarrhea, nausea and vomiting. Genitourinary: Negative for decreased urine volume, difficulty urinating, dysuria, frequency and hematuria. Musculoskeletal: Negative for arthralgias, back pain and neck pain. Skin: Negative for color change. Neurological: Negative for dizziness, weakness, light-headedness, numbness and headaches.          The patient has a   Family History   Problem Relation Age of Onset    Diabetes Mother     Diabetes Father     Hypertension Father     Stroke Sister     Other Sister         HIP REPLACEMENT       Objective:    /88 (Site: Left Upper Arm, Position: Sitting, Cuff Size: Large Adult) Comment: manual  Pulse 93   Temp 97.2 °F (36.2 °C)   Ht 6' 0.99\" (1.854 m)   Wt 270 lb 6.4 oz (122.7 kg)   BMI 35.68 kg/m²    BP Readings from Last 3 Encounters:   05/20/22 126/88   12/27/21 (!) 154/94   11/29/21 (!) 148/94 Physical Exam  Vitals and nursing note reviewed. Constitutional:       General: He is not in acute distress. Appearance: Normal appearance. He is not ill-appearing. Cardiovascular:      Rate and Rhythm: Normal rate and regular rhythm. Heart sounds: No murmur heard. Pulmonary:      Effort: Pulmonary effort is normal.      Breath sounds: Normal breath sounds. Abdominal:      Palpations: Abdomen is soft. There is no mass. Tenderness: There is no abdominal tenderness. Musculoskeletal:         General: No swelling or tenderness. Normal range of motion. Neurological:      General: No focal deficit present. Mental Status: He is alert and oriented to person, place, and time. Lab Results   Component Value Date    WBC 6.7 11/12/2021    HGB 12.3 (L) 11/12/2021    HCT 38.1 (L) 11/12/2021     11/12/2021    CHOL 95 02/12/2021    CHOL 95 02/12/2021    TRIG 67 02/12/2021    TRIG 67 02/12/2021    HDL 36 (L) 02/12/2021    HDL 36 (L) 02/12/2021    ALT 27 02/12/2021    ALT 27 02/12/2021    AST 26 02/12/2021    AST 26 02/12/2021     11/12/2021    K 5.1 11/12/2021    CL 99 11/12/2021    CREATININE 0.96 11/12/2021    BUN 13 11/12/2021    CO2 26 11/12/2021    LABA1C 11.4 05/20/2022    LABMICR 32 (H) 02/12/2021     Lab Results   Component Value Date    CALCIUM 8.5 (L) 11/12/2021     Lab Results   Component Value Date    LDLCHOLESTEROL 46 02/12/2021    LDLCHOLESTEROL 46 02/12/2021       Assessment and Plan:    1. Essential hypertension  Well controlled. Continue same management. Counseled patient regarding diet, exercise and med compliance    - Basic Metabolic Panel; Future  - metoprolol succinate (TOPROL XL) 50 MG extended release tablet; Take 1 tablet by mouth daily  Dispense: 60 tablet; Refill: 3  - lisinopril (PRINIVIL;ZESTRIL) 40 MG tablet; Take 1 tablet by mouth daily  Dispense: 60 tablet; Refill: 3    2. Neuropathy    - gabapentin (NEURONTIN) 600 MG tablet;  Take 1 tablet by mouth 3 times daily for 120 days. Dispense: 90 tablet; Refill: 3    3. Type 2 diabetes mellitus with complication, with long-term current use of insulin ([facility])  A1c is 11. On insulin pump. Follows with Dr. Kiah Jiang. States he has an appointment with Dr. Kiah Jiang next month. - POCT glycosylated hemoglobin (Hb A1C)  - Basic Metabolic Panel; Future  - Microalbumin, Ur; Future  - metoprolol succinate (TOPROL XL) 50 MG extended release tablet; Take 1 tablet by mouth daily  Dispense: 60 tablet; Refill: 3  - gabapentin (NEURONTIN) 600 MG tablet; Take 1 tablet by mouth 3 times daily for 120 days. Dispense: 90 tablet; Refill: 3    4. Hyperlipidemia, unspecified hyperlipidemia type    - Lipid Panel; Future          Requested Prescriptions     Signed Prescriptions Disp Refills    metoprolol succinate (TOPROL XL) 50 MG extended release tablet 60 tablet 3     Sig: Take 1 tablet by mouth daily    lisinopril (PRINIVIL;ZESTRIL) 40 MG tablet 60 tablet 3     Sig: Take 1 tablet by mouth daily    atorvastatin (LIPITOR) 40 MG tablet 60 tablet 3     Sig: Take 1 tablet by mouth daily    aspirin (RA ASPIRIN EC) 81 MG EC tablet 60 tablet 3     Sig: take 1 tablet by mouth once daily    gabapentin (NEURONTIN) 600 MG tablet 90 tablet 3     Sig: Take 1 tablet by mouth 3 times daily for 120 days. Medications Discontinued During This Encounter   Medication Reason    aspirin (RA ASPIRIN EC) 81 MG EC tablet REORDER    gabapentin (NEURONTIN) 600 MG tablet REORDER    atorvastatin (LIPITOR) 40 MG tablet REORDER    metoprolol succinate (TOPROL XL) 50 MG extended release tablet REORDER    lisinopril (PRINIVIL;ZESTRIL) 40 MG tablet REORDER       Raul received counseling on the following healthy behaviors: nutrition, exercise and medication adherence    Discussed use,benefit, and side effects of prescribed medications. Barriers to medication compliance addressed. All patient questions answered. Pt voiced understanding. Return in about 3 months (around 8/20/2022) for follow up, HTN. Disclaimer: Some orall of this note was transcribed using voice-recognition software. This may cause typographical errors occasionally. Although all effort is made to fix these errors, please do not hesitate to contact our office if there Henry Bee concern with the understanding of this note.

## 2022-05-20 NOTE — PROGRESS NOTES
Diabetic visit information    BP Readings from Last 3 Encounters:   12/27/21 (!) 154/94   11/29/21 (!) 148/94   11/12/21 129/77       Hemoglobin A1C (%)   Date Value   01/29/2019 11.6 (H)   11/21/2018 12.5   08/10/2018 11.8     Microalb/Crt. Ratio (mcg/mg creat)   Date Value   02/12/2021 32 (H)     LDL Cholesterol (mg/dL)   Date Value   02/12/2021 46   02/12/2021 46               Have you changed or started any medications since your last visit including any over-the-counter medicines, vitamins, or herbal medicines? no   Have you stopped taking any of your medications? Is so, why? -  no  Are you having any side effects from any of your medications? - no    Have you seen any other physician or provider since your last visit? yes - Podiatry    Have you had any other diagnostic tests since your last visit? yes - NM CARDIAL RESTING TEST    Have you been seen in the emergency room and/or had an admission in a hospital since we last saw you?  no     Have you had your annual diabetic retinal (eye) exam? Yes - [facility]   (ensure copy of exam is in the chart)    Have you had your routine dental cleaning in the past 6 months? no    Do you have an active MyChart account? If not, what are your barriers? Yes    Patient Care Team:  Aye Carmichael MD as PCP - General (Family Medicine)    Medical history Review  Past Medical, Family, and Social History reviewed and does not contribute to the patient presenting condition.     Health Maintenance   Topic Date Due    Annual Wellness Visit (AWV)  Never done    COVID-19 Vaccine (1) Never done    Diabetic retinal exam  Never done    Colorectal Cancer Screen  Never done    Hepatitis B vaccine (2 of 3 - Risk 3-dose series) 05/10/2019    Shingles vaccine (2 of 2) 02/01/2020    Pneumococcal 0-64 years Vaccine (2 - PCV) 04/12/2020    A1C test (Diabetic or Prediabetic)  11/09/2021    Depression Screen  02/11/2022    Diabetic microalbuminuria test  02/12/2022    Lipids 02/12/2022    Diabetic foot exam  11/10/2022    DTaP/Tdap/Td vaccine (2 - Td or Tdap) 08/10/2028    Flu vaccine  Completed    Hepatitis C screen  Completed    HIV screen  Completed    Hepatitis A vaccine  Aged Out    Hib vaccine  Aged Out    Meningococcal (ACWY) vaccine  Aged Out

## 2022-05-25 DIAGNOSIS — R10.13 DYSPEPSIA: ICD-10-CM

## 2022-05-25 RX ORDER — FAMOTIDINE 40 MG/1
TABLET, FILM COATED ORAL
Qty: 30 TABLET | Refills: 2 | Status: SHIPPED | OUTPATIENT
Start: 2022-05-25 | End: 2022-09-15 | Stop reason: SDUPTHER

## 2022-05-25 NOTE — TELEPHONE ENCOUNTER
E-scribe request for med refills. Please review and e-scribe if applicable. Last Visit Date:  5/20/22  Next Visit Date:  Visit date not found    Hemoglobin A1C (%)   Date Value   05/20/2022 11.4   01/29/2019 11.6 (H)   11/21/2018 12.5             ( goal A1C is < 7)   Microalb/Crt.  Ratio (mcg/mg creat)   Date Value   02/12/2021 32 (H)     LDL Cholesterol (mg/dL)   Date Value   02/12/2021 46   02/12/2021 46       (goal LDL is <100)   AST (U/L)   Date Value   02/12/2021 26   02/12/2021 26     ALT (U/L)   Date Value   02/12/2021 27   02/12/2021 27     BUN (mg/dL)   Date Value   11/12/2021 13     BP Readings from Last 3 Encounters:   05/20/22 126/88   12/27/21 (!) 154/94   11/29/21 (!) 148/94          (goal 120/80)        Patient Active Problem List:     Type 2 diabetes mellitus with complication, with long-term current use of insulin (HCC)     Essential hypertension     Atypical chest pain     Need for prophylactic vaccination and inoculation against varicella     Allergic rhinitis     Pain in right toe(s)     Foot infection     Diabetic ulcer of toe of right foot associated with type 2 diabetes mellitus, with fat layer exposed ([address]     Ulcer of foot due to secondary diabetes ([address]     Ulcer of right foot, limited to breakdown of skin ([address]     Chest pain on breathing     Neuropathy     Hyperlipidemia      ----Earlis Royal

## 2022-06-13 RX ORDER — FLASH GLUCOSE SENSOR
KIT MISCELLANEOUS
Qty: 2 EACH | Refills: 0 | Status: SHIPPED | OUTPATIENT
Start: 2022-06-13

## 2022-06-13 RX ORDER — FLASH GLUCOSE SCANNING READER
EACH MISCELLANEOUS
Qty: 1 EACH | Refills: 0 | Status: SHIPPED | OUTPATIENT
Start: 2022-06-13

## 2022-06-13 RX ORDER — INSULIN LISPRO 100 [IU]/ML
INJECTION, SOLUTION INTRAVENOUS; SUBCUTANEOUS
Qty: 60 ML | Refills: 0 | Status: SHIPPED | OUTPATIENT
Start: 2022-06-13 | End: 2022-09-15 | Stop reason: SDUPTHER

## 2022-06-13 NOTE — TELEPHONE ENCOUNTER
E-scribe request for med refills. Please review and e-scribe if applicable. Last Visit Date:  5/20/22  Next Visit Date:  Visit date not found    Hemoglobin A1C (%)   Date Value   05/20/2022 11.4   01/29/2019 11.6 (H)   11/21/2018 12.5             ( goal A1C is < 7)   Microalb/Crt.  Ratio (mcg/mg creat)   Date Value   02/12/2021 32 (H)     LDL Cholesterol (mg/dL)   Date Value   02/12/2021 46   02/12/2021 46       (goal LDL is <100)   AST (U/L)   Date Value   02/12/2021 26   02/12/2021 26     ALT (U/L)   Date Value   02/12/2021 27   02/12/2021 27     BUN (mg/dL)   Date Value   11/12/2021 13     BP Readings from Last 3 Encounters:   05/20/22 126/88   12/27/21 (!) 154/94   11/29/21 (!) 148/94          (goal 120/80)        Patient Active Problem List:     Type 2 diabetes mellitus with complication, with long-term current use of insulin (HCC)     Essential hypertension     Atypical chest pain     Need for prophylactic vaccination and inoculation against varicella     Allergic rhinitis     Pain in right toe(s)     Foot infection     Diabetic ulcer of toe of right foot associated with type 2 diabetes mellitus, with fat layer exposed ([address]     Ulcer of foot due to secondary diabetes ([address]     Ulcer of right foot, limited to breakdown of skin ([address]     Chest pain on breathing     Neuropathy     Hyperlipidemia      ----Tessa Christopher
Unable to offer due to clinical condition

## 2022-08-11 ENCOUNTER — TELEPHONE (OUTPATIENT)
Dept: FAMILY MEDICINE CLINIC | Age: 52
End: 2022-08-11

## 2022-08-11 DIAGNOSIS — I10 ESSENTIAL HYPERTENSION: ICD-10-CM

## 2022-08-11 NOTE — TELEPHONE ENCOUNTER
[facility] HEALTH CLINICAL PHARMACY: ADHERENCE REVIEW  Identified care gap per [place]: fills at [facility] Aid : ACE/ARB and Statin adherence    Last Visit: 5/20/22    (Also on insulin) with Freestyle     ASSESSMENT  ACE/ARB ADHERENCE    Insurance Records claims through 7.25.22 (Prior Year PDC = FAILED; [place]ARL [place] =  73%; Potential Fail Date: 8/21/22 ):   LISINOPRIL 40 MG  Next refill due: 6/24/22    Per  [place] Portal:   last filled on 7/29/22 for 30 day supply. 2RF (written for 60DS)    Due 8/29/22    Per Pharmacist-just picked up on 8/10 so won't go through insurance. He will set it up to auto fill for 90 day supply. Will most likely fail measure    BP Readings from Last 3 Encounters:   05/20/22 126/88   12/27/21 (!) 154/94   11/29/21 (!) 148/94     CrCl cannot be calculated (Patient's most recent lab result is older than the maximum 180 days allowed. ). DIABETES    Lab Results   Component Value Date    LABA1C 11.4 05/20/2022    LABA1C 11.6 (H) 01/29/2019    LABA1C 12.5 11/21/2018     NOTE A1c >9%    STATIN ADHERENCE    Insurance Records claims through 7.25.22 YTD [place] =  100%; Potential Fail Date: 10/30/22 ):   Atorvastatin 40mg Next refill due: 9/12/22    (Getting 60 day supply) 1RF    Lab Results   Component Value Date    CHOL 95 02/12/2021    CHOL 95 02/12/2021    TRIG 67 02/12/2021    TRIG 67 02/12/2021    HDL 36 (L) 02/12/2021    HDL 36 (L) 02/12/2021    LDLCHOLESTEROL 46 02/12/2021    LDLCHOLESTEROL 46 02/12/2021     ALT   Date Value Ref Range Status   02/12/2021 27 5 - 41 U/L Final   02/12/2021 27 5 - 41 U/L Final     AST   Date Value Ref Range Status   02/12/2021 26 <40 U/L Final   02/12/2021 26 <40 U/L Final     The ASCVD Risk score (Ming Sepulveda, et al., 2013) failed to calculate for the following reasons: The valid total cholesterol range is 130 to 320 mg/dL     PLAN  The following are interventions that have been identified:   Verified both above.     Lisinopril due to refill 8/29/22 with fail date of 8/21/22  Patient would like 90DS on both lisinopril & atorvastatin. Will route to PharmD to facilitate refill authorizations. Writer can follow up to verify refill prior to fail date. Future Appointments   Date Time Provider Kaley Ocampo   9/2/2022  3:30 PM Ck Warren MD [id number]       Amira Blunt CP.    [facility] free: [phone]

## 2022-08-11 NOTE — TELEPHONE ENCOUNTER
Janessa Chaudhary MD, patient would prefer 90ds on some of his maintenance medications if possible. New Rx would be required to fill this way. Rx pended for your signature/modification as appropriate    LOV: 5/20/22  Next: 9/2/22    Thank you,  NICHOLAS Ragland, PharmD, [facility]  Department, toll free: [phone]

## 2022-08-13 RX ORDER — LISINOPRIL 40 MG/1
40 TABLET ORAL DAILY
Qty: 90 TABLET | Refills: 1 | Status: SHIPPED | OUTPATIENT
Start: 2022-08-13 | End: 2022-09-15 | Stop reason: SDUPTHER

## 2022-08-13 RX ORDER — ATORVASTATIN CALCIUM 40 MG/1
40 TABLET, FILM COATED ORAL DAILY
Qty: 90 TABLET | Refills: 1 | Status: SHIPPED | OUTPATIENT
Start: 2022-08-13 | End: 2022-09-15 | Stop reason: SDUPTHER

## 2022-08-15 NOTE — TELEPHONE ENCOUNTER
For [facility] in place:  No  Recommendation Provided To: Provider: 1 via Note to Provider, Patient/Caregiver: 1 via Telephone, and Pharmacy: 1  Intervention Detail: Adherence Monitorin and New Rx: 1, reason: BRENDAN, Patient Preference  Gap Closed?: Yes   Intervention Accepted By: Provider: 1, Patient/Caregiver: 1, and Pharmacy: 0  Time Spent (min): 20

## 2022-08-30 RX ORDER — METOPROLOL SUCCINATE 25 MG/1
TABLET, EXTENDED RELEASE ORAL
Qty: 90 TABLET | Refills: 0 | OUTPATIENT
Start: 2022-08-30

## 2022-08-30 NOTE — TELEPHONE ENCOUNTER
Last visit: 5/20/22  Last Med refill: 8/20/22  Does patient have enough medication for 72 hours: Yes    Next Visit Date:  Future Appointments   Date Time Provider Kaley Ocampo   9/2/2022  3:30 PM Lu Gallo MD [address] [place] [address] Maintenance   Topic Date Due    Annual Wellness Visit (AWV)  Never done    COVID-19 Vaccine (1) Never done    Diabetic retinal exam  Never done    Colorectal Cancer Screen  Never done    Hepatitis B vaccine (2 of 3 - Risk 3-dose series) 05/10/2019    Shingles vaccine (2 of 2) 02/01/2020    Pneumococcal 0-64 years Vaccine (2 - PCV) 04/12/2020    Diabetic microalbuminuria test  02/12/2022    Lipids  02/12/2022    A1C test (Diabetic or Prediabetic)  08/20/2022    Flu vaccine (1) 09/01/2022    Diabetic foot exam  11/10/2022    Depression Screen  05/20/2023    DTaP/Tdap/Td vaccine (2 - Td or Tdap) 08/10/2028    Hepatitis C screen  Completed    HIV screen  Completed    Hepatitis A vaccine  Aged Out    Hib vaccine  Aged Out    Meningococcal (ACWY) vaccine  Aged Out       Hemoglobin A1C (%)   Date Value   05/20/2022 11.4   01/29/2019 11.6 (H)   11/21/2018 12.5             ( goal A1C is < 7)   Microalb/Crt.  Ratio (mcg/mg creat)   Date Value   02/12/2021 32 (H)     LDL Cholesterol (mg/dL)   Date Value   02/12/2021 46   02/12/2021 46       (goal LDL is <100)   AST (U/L)   Date Value   02/12/2021 26   02/12/2021 26     ALT (U/L)   Date Value   02/12/2021 27   02/12/2021 27     BUN (mg/dL)   Date Value   11/12/2021 13     BP Readings from Last 3 Encounters:   05/20/22 126/88   12/27/21 (!) 154/94   11/29/21 (!) 148/94          (goal 120/80)    All Future Testing planned in [facility]  Lab Frequency Next Occurrence   NM MYOCARDIAL SPECT REST EXERCISE OR RX Once 10/14/2022   Cologuard (For External Results Only) Once 11/29/2022   NM MYOCARDIAL SPECT REST EXERCISE OR RX Once 12/27/2022   Lipid Panel Once 69/86/2344   Basic Metabolic Panel Once 81/86/7250   Microalbumin, Ur Once 05/20/2022 Patient Active Problem List:     Type 2 diabetes mellitus with complication, with long-term current use of insulin (HCC)     Essential hypertension     Atypical chest pain     Need for prophylactic vaccination and inoculation against varicella     Allergic rhinitis     Pain in right toe(s)     Foot infection     Diabetic ulcer of toe of right foot associated with type 2 diabetes mellitus, with fat layer exposed ([address]     Ulcer of foot due to secondary diabetes ([address]     Ulcer of right foot, limited to breakdown of skin ([address]     Chest pain on breathing     Neuropathy     Hyperlipidemia

## 2022-09-14 ENCOUNTER — TELEPHONE (OUTPATIENT)
Dept: PHARMACY | Facility: CLINIC | Age: 52
End: 2022-09-14

## 2022-09-14 NOTE — TELEPHONE ENCOUNTER
[facility] HEALTH CLINICAL PHARMACY: ADHERENCE REVIEW  Identified care gap per [place]: fills at [facility] Aid : Diabetes and Statin adherence    Last Visit: 5/20/22    Patient not found in [facility]    [address] Records claims through 8/22/22; YTD [place] =  76%; Potential Fail Date: 9/19/22 ):   LISINOPRIL 40 MG TABLET due to refill 8/28/22 for 30 day supply. Per [facility]:   LISINOPRIL 40 MG TABLET last picked up on 9/1/22 for 90 day supply. 1 refills remaining. Billed through [facility]     BP Readings from Last 3 Encounters:   05/20/22 126/88   12/27/21 (!) 154/94   11/29/21 (!) 148/94     CrCl cannot be calculated (Patient's most recent lab result is older than the maximum 180 days allowed. ). [address] Records claims through 8/22/22; YTD [place] =  100%; Potential Fail Date: 10/30/22 ):   ATORVASTATIN TAB 40MG due to refill 9/12/22 for 60 day supply. Per [facility]:   ATORVASTATIN TAB 40MG last picked up on 7/5/22 for 60 day supply. 0 refills remaining. Billed through [facility]. New rx on file for 90 d/s. Lab Results   Component Value Date    CHOL 95 02/12/2021    CHOL 95 02/12/2021    TRIG 67 02/12/2021    TRIG 67 02/12/2021    HDL 36 (L) 02/12/2021    HDL 36 (L) 02/12/2021    LDLCHOLESTEROL 46 02/12/2021    LDLCHOLESTEROL 46 02/12/2021     ALT   Date Value Ref Range Status   02/12/2021 27 5 - 41 U/L Final   02/12/2021 27 5 - 41 U/L Final     AST   Date Value Ref Range Status   02/12/2021 26 <40 U/L Final   02/12/2021 26 <40 U/L Final     The ASCVD Risk score (Rosangela Bernstein, et al., 2013) failed to calculate for the following reasons: The valid total cholesterol range is 130 to 320 mg/dL     PLAN    Patient recently picked up Lisinopril for 90 d/s. Appears adherent at this time and no outreach planned for now.      Future Appointments   Date Time Provider Kaley Ocampo   9/15/2022  3:30 PM Andre Williamson MD [address]   Direct: [phone]  Phone: toll free [phone]     For Pharmacy Admin Tracking Only    CPA in place:  No  Gap Closed?: Yes   Time Spent (min): 15

## 2022-09-15 ENCOUNTER — OFFICE VISIT (OUTPATIENT)
Dept: FAMILY MEDICINE CLINIC | Age: 52
End: 2022-09-15
Payer: MEDICARE

## 2022-09-15 VITALS
WEIGHT: 264.4 LBS | BODY MASS INDEX: 35.04 KG/M2 | SYSTOLIC BLOOD PRESSURE: 144 MMHG | HEART RATE: 96 BPM | DIASTOLIC BLOOD PRESSURE: 84 MMHG | HEIGHT: 73 IN | TEMPERATURE: 98 F

## 2022-09-15 DIAGNOSIS — R10.13 DYSPEPSIA: ICD-10-CM

## 2022-09-15 DIAGNOSIS — E11.8 TYPE 2 DIABETES MELLITUS WITH COMPLICATION, WITH LONG-TERM CURRENT USE OF INSULIN (HCC): Primary | ICD-10-CM

## 2022-09-15 DIAGNOSIS — R07.89 CHEST PAIN, ATYPICAL: ICD-10-CM

## 2022-09-15 DIAGNOSIS — G62.9 NEUROPATHY: ICD-10-CM

## 2022-09-15 DIAGNOSIS — I10 ESSENTIAL HYPERTENSION: ICD-10-CM

## 2022-09-15 DIAGNOSIS — Z79.4 TYPE 2 DIABETES MELLITUS WITH COMPLICATION, WITH LONG-TERM CURRENT USE OF INSULIN (HCC): Primary | ICD-10-CM

## 2022-09-15 LAB — HBA1C MFR BLD: 12.1 %

## 2022-09-15 PROCEDURE — 99213 OFFICE O/P EST LOW 20 MIN: CPT

## 2022-09-15 PROCEDURE — G8417 CALC BMI ABV UP PARAM F/U: HCPCS

## 2022-09-15 PROCEDURE — 3017F COLORECTAL CA SCREEN DOC REV: CPT

## 2022-09-15 PROCEDURE — G8427 DOCREV CUR MEDS BY ELIG CLIN: HCPCS

## 2022-09-15 PROCEDURE — 83036 HEMOGLOBIN GLYCOSYLATED A1C: CPT

## 2022-09-15 PROCEDURE — 3046F HEMOGLOBIN A1C LEVEL >9.0%: CPT

## 2022-09-15 PROCEDURE — 2022F DILAT RTA XM EVC RTNOPTHY: CPT

## 2022-09-15 PROCEDURE — 1036F TOBACCO NON-USER: CPT

## 2022-09-15 RX ORDER — LISINOPRIL 40 MG/1
40 TABLET ORAL DAILY
Qty: 90 TABLET | Refills: 1 | Status: SHIPPED | OUTPATIENT
Start: 2022-09-15 | End: 2022-10-17 | Stop reason: SDUPTHER

## 2022-09-15 RX ORDER — NITROGLYCERIN 0.4 MG/1
0.4 TABLET SUBLINGUAL EVERY 5 MIN PRN
Qty: 25 TABLET | Refills: 3 | Status: SHIPPED | OUTPATIENT
Start: 2022-09-15 | End: 2022-10-17 | Stop reason: SDUPTHER

## 2022-09-15 RX ORDER — ASPIRIN 81 MG/1
TABLET ORAL
Qty: 60 TABLET | Refills: 3 | Status: SHIPPED | OUTPATIENT
Start: 2022-09-15 | End: 2022-10-17 | Stop reason: SDUPTHER

## 2022-09-15 RX ORDER — ATORVASTATIN CALCIUM 40 MG/1
40 TABLET, FILM COATED ORAL DAILY
Qty: 90 TABLET | Refills: 1 | Status: SHIPPED | OUTPATIENT
Start: 2022-09-15 | End: 2022-10-17 | Stop reason: SDUPTHER

## 2022-09-15 RX ORDER — FAMOTIDINE 40 MG/1
TABLET, FILM COATED ORAL
Qty: 30 TABLET | Refills: 2 | Status: SHIPPED | OUTPATIENT
Start: 2022-09-15 | End: 2022-10-17 | Stop reason: SDUPTHER

## 2022-09-15 RX ORDER — GABAPENTIN 600 MG/1
600 TABLET ORAL 3 TIMES DAILY
Qty: 90 TABLET | Refills: 3 | Status: SHIPPED | OUTPATIENT
Start: 2022-09-15 | End: 2022-10-17 | Stop reason: SDUPTHER

## 2022-09-15 RX ORDER — METOPROLOL SUCCINATE 50 MG/1
50 TABLET, EXTENDED RELEASE ORAL DAILY
Qty: 30 TABLET | Refills: 2 | Status: SHIPPED | OUTPATIENT
Start: 2022-09-15 | End: 2022-09-30

## 2022-09-15 RX ORDER — INSULIN LISPRO 100 [IU]/ML
INJECTION, SOLUTION INTRAVENOUS; SUBCUTANEOUS
Qty: 60 ML | Refills: 2 | Status: SHIPPED | OUTPATIENT
Start: 2022-09-15

## 2022-09-15 ASSESSMENT — ENCOUNTER SYMPTOMS
COUGH: 0
CONSTIPATION: 0
SHORTNESS OF BREATH: 0
VOMITING: 0
DIARRHEA: 0
ABDOMINAL DISTENTION: 0

## 2022-09-15 NOTE — PROGRESS NOTES
Diabetic microalbuminuria test  02/12/2022    Lipids  02/12/2022    Flu vaccine (1) 09/01/2022    A1C test (Diabetic or Prediabetic)  08/20/2022    Diabetic foot exam  11/10/2022    Depression Screen  05/20/2023    DTaP/Tdap/Td vaccine (2 - Td or Tdap) 08/10/2028    Hepatitis C screen  Completed    HIV screen  Completed    Hepatitis A vaccine  Aged Out    Hib vaccine  Aged Out    Meningococcal (ACWY) vaccine  Aged Out

## 2022-09-15 NOTE — PROGRESS NOTES
[facility] Medicine Residency Program - Outpatient Note      Subjective:    Todd Song is a 45 y.o. male with  has a past medical history of Cervical disc disease and Diabetes mellitus ([address] Presented to the office today for:  Chief Complaint   Patient presents with    Hypertension     Follow up on HTN, and needs med refills       HPI    Pt is a 68-year-old male presenting to clinic for diabetes follow-up. HbA1c today is 12.1%, patient states he is doing well. Upon further questioning about patient's diet, he states he has cut out sugars. When asking about his carb intake patient does admit that he eats potatoes, bread, cereal etc. patient was unaware that this would cause spikes in his blood sugar. He was previously under the care of an endocrinologist but states that due to location it was too far for him and is requesting a new referral to another physician. Patient does have continuous glucose monitoring box at home that has not yet been placed on him, states that he was supposed to follow-up with endocrinology to get it placed. Offered to patient to come in and see nurse Breana Morley to have it placed tomorrow, patient agreeable to plan. Also interested in the starting fresh program and going to see diabetes educator to help get his diabetes under control. And also requesting medication refills today. Review of Systems   Constitutional:  Negative for chills and fever. Respiratory:  Negative for cough and shortness of breath. Cardiovascular:  Negative for leg swelling. Gastrointestinal:  Negative for abdominal distention, constipation, diarrhea and vomiting. Endocrine: Negative for polyuria. Genitourinary:  Negative for difficulty urinating. Neurological:  Negative for dizziness and headaches.      The patient has a   Family History   Problem Relation Age of Onset    Diabetes Mother     Diabetes Father     Hypertension Father     Stroke Sister     Other nutrition. Patient does walk daily, also suggested to do some additional strength training as well. Patient to see [facility] tomorrow for placement of CGM. - POCT glycosylated hemoglobin (Hb A1C)  - Thi Morfin MD, Endocrinology, [address] Starting [employer] [facility] [employer] [facility] Program  - gabapentin (NEURONTIN) 600 MG tablet; Take 1 tablet by mouth 3 times daily for 120 days. Dispense: 90 tablet; Refill: 3  - metoprolol succinate (TOPROL XL) 50 MG extended release tablet; Take 1 tablet by mouth daily  Dispense: 30 tablet; Refill: 2    2. Dyspepsia  Medication refill  - famotidine (PEPCID) 40 MG tablet; take 1/2 tablet by mouth every evening  Dispense: 30 tablet; Refill: 2    3. Neuropathy  Medication refill  - gabapentin (NEURONTIN) 600 MG tablet; Take 1 tablet by mouth 3 times daily for 120 days. Dispense: 90 tablet; Refill: 3    4. Essential hypertension  Medication refill  - lisinopril (PRINIVIL;ZESTRIL) 40 MG tablet; Take 1 tablet by mouth daily  Dispense: 90 tablet; Refill: 1  - metoprolol succinate (TOPROL XL) 50 MG extended release tablet; Take 1 tablet by mouth daily  Dispense: 30 tablet; Refill: 2    5. Chest pain, atypical  Medication refill  - nitroGLYCERIN (NITROSTAT) 0.4 MG SL tablet; Place 1 tablet under the tongue every 5 minutes as needed for Chest pain (chest pain) up to max of 3 total doses. If no relief after 1 dose, call 911. Dispense: 25 tablet; Refill: 3          Requested Prescriptions     Signed Prescriptions Disp Refills    aspirin (RA ASPIRIN EC) 81 MG EC tablet 60 tablet 3     Sig: take 1 tablet by mouth once daily    atorvastatin (LIPITOR) 40 MG tablet 90 tablet 1     Sig: Take 1 tablet by mouth daily    famotidine (PEPCID) 40 MG tablet 30 tablet 2     Sig: take 1/2 tablet by mouth every evening    gabapentin (NEURONTIN) 600 MG tablet 90 tablet 3     Sig: Take 1 tablet by mouth 3 times daily for 120 days.     insulin lispro (HUMALOG) 100 UNIT/ML SOLN injection vial 60 mL 2     Sig: INJECT UP  UNITS DAILY VIA PUMP    lisinopril (PRINIVIL;ZESTRIL) 40 MG tablet 90 tablet 1     Sig: Take 1 tablet by mouth daily    metoprolol succinate (TOPROL XL) 50 MG extended release tablet 30 tablet 2     Sig: Take 1 tablet by mouth daily    nitroGLYCERIN (NITROSTAT) 0.4 MG SL tablet 25 tablet 3     Sig: Place 1 tablet under the tongue every 5 minutes as needed for Chest pain (chest pain) up to max of 3 total doses. If no relief after 1 dose, call 911. Medications Discontinued During This Encounter   Medication Reason    cetirizine (ZYRTEC) 10 MG tablet LIST CLEANUP    NOVOLOG FLEXPEN 100 UNIT/ML injection pen LIST CLEANUP    NOVOLOG FLEXPEN 100 UNIT/ML injection pen LIST CLEANUP    nitroGLYCERIN (NITROSTAT) 0.4 MG SL tablet REORDER    metoprolol succinate (TOPROL XL) 50 MG extended release tablet REORDER    aspirin (RA ASPIRIN EC) 81 MG EC tablet REORDER    gabapentin (NEURONTIN) 600 MG tablet REORDER    famotidine (PEPCID) 40 MG tablet REORDER    HUMALOG 100 UNIT/ML SOLN injection vial REORDER    lisinopril (PRINIVIL;ZESTRIL) 40 MG tablet REORDER    atorvastatin (LIPITOR) 40 MG tablet REORDER       Raul received counseling on the following healthy behaviors: nutrition, exercise and medication adherence    Discussed use,benefit, and side effects of prescribed medications. Barriers to medication compliance addressed. All patient questions answered. Pt voiced understanding. No follow-ups on file. Disclaimer: Some orall of this note was transcribed using voice-recognition software. This may cause typographical errors occasionally. Although all effort is made to fix these errors, please do not hesitate to contact our office if there Everrett Roseliater concern with the understanding of this note.

## 2022-09-15 NOTE — PROGRESS NOTES
Attending Physician Statement  I have discussed the care of Jacques Rhoades 45 y.o. male, including pertinent history and exam findings, with the resident Dr. Elsa Benitez MD.    History and Exam:   Chief Complaint   Patient presents with    Hypertension     Follow up on HTN, and needs med refills       Past Medical History:   Diagnosis Date    Cervical disc disease     Diabetes mellitus ([facility] [address] 1993    IDDM     No Known Allergies   I have seen and examined the patient and the key elements of the encounter have been performed by me. BP Readings from Last 3 Encounters:   09/15/22 (!) 144/84   05/20/22 126/88   12/27/21 (!) 154/94     BP (!) 144/84 (Site: Right Upper Arm, Position: Sitting, Cuff Size: Medium Adult)   Pulse 96   Temp 98 °F (36.7 °C) (Temporal)   Ht 6' 0.99\" (1.854 m)   Wt 264 lb 6.4 oz (119.9 kg)   BMI 34.89 kg/m²   Lab Results   Component Value Date    WBC 6.7 11/12/2021    HGB 12.3 (L) 11/12/2021    HCT 38.1 (L) 11/12/2021     11/12/2021    CHOL 95 02/12/2021    CHOL 95 02/12/2021    TRIG 67 02/12/2021    TRIG 67 02/12/2021    HDL 36 (L) 02/12/2021    HDL 36 (L) 02/12/2021    ALT 27 02/12/2021    ALT 27 02/12/2021    AST 26 02/12/2021    AST 26 02/12/2021     11/12/2021    K 5.1 11/12/2021    CL 99 11/12/2021    CREATININE 0.96 11/12/2021    BUN 13 11/12/2021    CO2 26 11/12/2021    LABA1C 12.1 09/15/2022    LABMICR 32 (H) 02/12/2021     Lab Results   Component Value Date    LABALBU 4.3 02/12/2021    LABALBU 4.3 02/12/2021     No results found for: IRON, TIBC, FERRITIN  Lab Results   Component Value Date    LDLCHOLESTEROL 46 02/12/2021    LDLCHOLESTEROL 46 02/12/2021     I agree with the assessment, plan and the diagnosis of    Diagnosis Orders   1.  Type 2 diabetes mellitus with complication, with long-term current use of insulin (HCC)  POCT glycosylated hemoglobin (Hb A1C)    Marky Abarca MD, Endocrinology, [address] [place] [address] Starting [employer] [facility] Program    gabapentin (NEURONTIN) 600 MG tablet    metoprolol succinate (TOPROL XL) 50 MG extended release tablet      2. Dyspepsia  famotidine (PEPCID) 40 MG tablet      3. Neuropathy  gabapentin (NEURONTIN) 600 MG tablet      4. Essential hypertension  lisinopril (PRINIVIL;ZESTRIL) 40 MG tablet    metoprolol succinate (TOPROL XL) 50 MG extended release tablet      5. Chest pain, atypical  nitroGLYCERIN (NITROSTAT) 0.4 MG SL tablet       . I agree with orders as documented by the resident. More than 25 minutes spent  in face to face encounter with the patient and more than half in counseling. Patient's questions were answered. Patient Voiced understanding to the counseling. Return in about 2 months (around 11/15/2022), or if symptoms worsen or fail to improve, for DM.    (GC Modifier)-Dr. Henry Smith MD

## 2022-09-15 NOTE — PATIENT INSTRUCTIONS
Thank you for letting us take care of you today. We hope all your questions were addressed. If a question was overlooked or something else comes to mind after you return home, please contact a member of your Care Team listed below. Your Care Team at [place] [address] is Team #1  Lev Joy MD (Faculty)  Kristen Gonzalez MD(Resident)  Jm Ceballos MD (Resident)  Merlin Whalen DO (Resident)  Aishwarya Fletcher, CRISTIANE Sargent., CRISTIANE Suarez., DEYANIRA Baez., Virginia Gallardo., [facility] office)  Daylene Hodgkins, [address] (Clinical Practice Manager)  Glenn Villanueva [facility] (Clinical Pharmacist)     Office phone number: [phone]    If you need to get in right away due to illness, please be advised we have \"Same Day\" appointments available Monday-Friday. Please call us at [phone] option #3 to schedule your \"Same Day\" appointment.

## 2022-09-16 ENCOUNTER — NURSE ONLY (OUTPATIENT)
Dept: FAMILY MEDICINE CLINIC | Age: 52
End: 2022-09-16

## 2022-09-16 DIAGNOSIS — Z79.899 MEDICATION MANAGEMENT: Primary | ICD-10-CM

## 2022-09-16 PROCEDURE — 99999 PR OFFICE/OUTPT VISIT,PROCEDURE ONLY: CPT | Performed by: PHARMACIST

## 2022-09-16 PROCEDURE — APPNB60 APP NON BILLABLE TIME 46-60 MINS: Performed by: PHARMACIST

## 2022-09-16 NOTE — Clinical Note
Dr Dillon Banks,  Met with patient on Friday for Hachiko sensor application. Sensor he brought to office was . Used office sample to get him started. Now profile is linked to office [facility] account for viewing glucose readings. Sent new orders off to [employer] for Hachiko 2 sensors and reader. Will meet with patient again in 2 weeks to review report. Patient shared that he will be following up with endocrinology. Noted that no PCP appointment had been scheduled. Naa Fair, Pharm. D., [address] Medication Management Service [phone] 2022 6:14 PM

## 2022-09-19 NOTE — PROGRESS NOTES
Medication Management Service ([address] [place] [address])  [facility]  [phone]     CLINICAL PHARMACY NOTE:      Met with patient for application of Rian 2 sensor. Patient brought sensor to appointment today. It was for the Rubio 14 CGM system. Sensor had . Rian-14 CGM is being phased out by [employer]. Noted that patient is currently also using an Omnipod pass insulin pump which is not compatible with the Rian CGM system or Dexcom G6. New referral to endocrinology was placed on 09/15/2022. Patient shared that his phone is currently not compatible with Omnipod 5 which would be able to communicate with the Dexcom G6. He is unsure if he will be able to be started on Omnipod 5 insulin pump. Reports that his daughter has the Omnipod 5 and uses her IPhone for both the Dexcom G6 and the Omnipod 5 insulin pump. Patient currently has an android platform phone. Office currently has Rian 2 CGM sensors available and patient's phone is compatible with Rubio 2 application. Rian 2 sensor applied upper arm using both skin tac and overlay bandage. Sample Lot# [id number]  Expiration-2022. Rian account linked to office account for viewing glucose readings until patient seen by endocrinology. Sensor was in the warm up period when patient left office. Unclear as to when patient will be seen by endocrinology. Will submit Rian 2 orders to [facility] ([address] [place] [address]). Signatures obtained. Packet faxed with confirmation received and uploaded to Media [employer]. Plan to meet with patient in 2 weeks to review LibreView Report.  (2022). Will need to check with patient as to when he is to follow up with PCP. No follow up appointment has been scheduled. Plan to help patient with next sensor application. Patient will bring sensor to next appointment. Melanie Garcia, Pharm. D., [address] Medication Management Service  [phone]  2022  6:07

## 2022-09-30 ENCOUNTER — OFFICE VISIT (OUTPATIENT)
Dept: FAMILY MEDICINE CLINIC | Age: 52
End: 2022-09-30
Payer: MEDICARE

## 2022-09-30 VITALS
SYSTOLIC BLOOD PRESSURE: 152 MMHG | HEART RATE: 87 BPM | BODY MASS INDEX: 35.78 KG/M2 | HEIGHT: 72 IN | DIASTOLIC BLOOD PRESSURE: 93 MMHG | WEIGHT: 264.2 LBS

## 2022-09-30 DIAGNOSIS — I10 ESSENTIAL HYPERTENSION: Primary | ICD-10-CM

## 2022-09-30 DIAGNOSIS — Z79.4 TYPE 2 DIABETES MELLITUS WITH OTHER SPECIFIED COMPLICATION, WITH LONG-TERM CURRENT USE OF INSULIN (HCC): ICD-10-CM

## 2022-09-30 DIAGNOSIS — Z79.4 TYPE 2 DIABETES MELLITUS WITH COMPLICATION, WITH LONG-TERM CURRENT USE OF INSULIN (HCC): ICD-10-CM

## 2022-09-30 DIAGNOSIS — E11.8 TYPE 2 DIABETES MELLITUS WITH COMPLICATION, WITH LONG-TERM CURRENT USE OF INSULIN (HCC): ICD-10-CM

## 2022-09-30 DIAGNOSIS — E11.69 TYPE 2 DIABETES MELLITUS WITH OTHER SPECIFIED COMPLICATION, WITH LONG-TERM CURRENT USE OF INSULIN (HCC): ICD-10-CM

## 2022-09-30 PROBLEM — E11.9 DIABETES MELLITUS (HCC): Status: ACTIVE | Noted: 2022-09-30

## 2022-09-30 PROCEDURE — 99213 OFFICE O/P EST LOW 20 MIN: CPT | Performed by: STUDENT IN AN ORGANIZED HEALTH CARE EDUCATION/TRAINING PROGRAM

## 2022-09-30 PROCEDURE — 3046F HEMOGLOBIN A1C LEVEL >9.0%: CPT | Performed by: STUDENT IN AN ORGANIZED HEALTH CARE EDUCATION/TRAINING PROGRAM

## 2022-09-30 RX ORDER — METOPROLOL SUCCINATE 50 MG/1
100 TABLET, EXTENDED RELEASE ORAL DAILY
Qty: 30 TABLET | Refills: 2 | Status: SHIPPED | OUTPATIENT
Start: 2022-09-30 | End: 2022-10-03 | Stop reason: SDUPTHER

## 2022-09-30 RX ORDER — FLASH GLUCOSE SENSOR
KIT MISCELLANEOUS
Qty: 2 EACH | Refills: 11 | Status: SHIPPED | OUTPATIENT
Start: 2022-09-30

## 2022-09-30 ASSESSMENT — PATIENT HEALTH QUESTIONNAIRE - PHQ9
SUM OF ALL RESPONSES TO PHQ QUESTIONS 1-9: 0
1. LITTLE INTEREST OR PLEASURE IN DOING THINGS: 0
SUM OF ALL RESPONSES TO PHQ QUESTIONS 1-9: 0
SUM OF ALL RESPONSES TO PHQ9 QUESTIONS 1 & 2: 0
2. FEELING DOWN, DEPRESSED OR HOPELESS: 0
SUM OF ALL RESPONSES TO PHQ QUESTIONS 1-9: 0
SUM OF ALL RESPONSES TO PHQ QUESTIONS 1-9: 0

## 2022-09-30 ASSESSMENT — ENCOUNTER SYMPTOMS
CHEST TIGHTNESS: 0
CONSTIPATION: 0
ABDOMINAL PAIN: 0
DIARRHEA: 0
COUGH: 0
BACK PAIN: 0
NAUSEA: 0
SHORTNESS OF BREATH: 0
COLOR CHANGE: 0
VOMITING: 0

## 2022-09-30 NOTE — PROGRESS NOTES
Subjective:    Malgorzata Taveras is a 45 y.o. male with  has a past medical history of Cervical disc disease and Diabetes mellitus ([facility] [address] Presented to the office today for:  Chief Complaint   Patient presents with    Diabetes     New odilia sensor    Hypertension       HPI      46 Y  O M with PMHx of DM , HTN presents for follow up. Last A1c was 12.1, he follow up with endocrinology for that. He is on insulin pump. He had odilia placed 2 weeks ago. He states he is getting good blood sugar numbers. /93, on lisinopril 40mg QD and toprol XL 50 mg QD. He took his BP medication today. He is not sure if he is taking both medications, however most likely he is taking both. Patient states that he will call back and let us know. Denies dizziness, headache, blurry vision, chest pain or leg swelling. Denies smokng, alcohol or drug use. Review of Systems   Constitutional:  Negative for activity change, appetite change, chills, fatigue and fever. HENT: Negative. Respiratory:  Negative for cough, chest tightness and shortness of breath. Cardiovascular:  Negative for chest pain, palpitations and leg swelling. Gastrointestinal:  Negative for abdominal pain, constipation, diarrhea, nausea and vomiting. Genitourinary:  Negative for decreased urine volume, difficulty urinating, dysuria, frequency and hematuria. Musculoskeletal:  Negative for arthralgias, back pain and neck pain. Skin:  Negative for color change. Neurological:  Negative for dizziness, weakness, light-headedness, numbness and headaches.      The patient has a   Family History   Problem Relation Age of Onset    Diabetes Mother     Diabetes Father     Hypertension Father     Stroke Sister     Other Sister         HIP REPLACEMENT       Objective:    BP (!) 152/93   Pulse 87   Ht 6' (1.829 m)   Wt 264 lb 3.2 oz (119.8 kg)   BMI 35.83 kg/m²    BP Readings from Last 3 Encounters:   09/30/22 (!) 152/93   09/15/22 (!) 144/84   05/20/22 126/88       Physical Exam  Vitals and nursing note reviewed. Constitutional:       General: He is not in acute distress. Appearance: Normal appearance. He is not ill-appearing. HENT:      Head: Normocephalic and atraumatic. Mouth/Throat:      Pharynx: Oropharynx is clear. Cardiovascular:      Rate and Rhythm: Normal rate and regular rhythm. Pulses: Normal pulses. Heart sounds: No murmur heard. Pulmonary:      Effort: Pulmonary effort is normal.      Breath sounds: Normal breath sounds. Abdominal:      Palpations: Abdomen is soft. There is no mass. Tenderness: There is no abdominal tenderness. Musculoskeletal:         General: No swelling or tenderness. Normal range of motion. Cervical back: Normal range of motion and neck supple. Neurological:      General: No focal deficit present. Mental Status: He is alert and oriented to person, place, and time. Lab Results   Component Value Date    WBC 6.7 11/12/2021    HGB 12.3 (L) 11/12/2021    HCT 38.1 (L) 11/12/2021     11/12/2021    CHOL 95 02/12/2021    CHOL 95 02/12/2021    TRIG 67 02/12/2021    TRIG 67 02/12/2021    HDL 36 (L) 02/12/2021    HDL 36 (L) 02/12/2021    ALT 27 02/12/2021    ALT 27 02/12/2021    AST 26 02/12/2021    AST 26 02/12/2021     11/12/2021    K 5.1 11/12/2021    CL 99 11/12/2021    CREATININE 0.96 11/12/2021    BUN 13 11/12/2021    CO2 26 11/12/2021    LABA1C 12.1 09/15/2022    LABMICR 32 (H) 02/12/2021     Lab Results   Component Value Date    CALCIUM 8.5 (L) 11/12/2021     Lab Results   Component Value Date    LDLCHOLESTEROL 46 02/12/2021    LDLCHOLESTEROL 46 02/12/2021       Assessment and Plan:    1. Essential hypertension  Uncontrolled. BP is 152/93. Will increase Toprol- mg daily. Counseled patient if he is not taking both medications to just take 50 or if he is taking both. Take 2 tablets of the metoprolol.   Will need to schedule an earlier appointment on Monday to discuss starting diuretic with the patient. - metoprolol succinate (TOPROL XL) 50 MG extended release tablet; Take 2 tablets by mouth daily  Dispense: 30 tablet; Refill: 2    2. Type 2 diabetes mellitus with other specified complication, with long-term current use of insulin (HCC)  Poorly controlled, patient follows with endocrinology. Patient has insulin pump and just had neto inserted for CGM. Regarding diet exercise and weight loss along with medication compliance. - Continuous Blood Gluc Sensor (FREESTYLE NETO 2 SENSOR) MISC; Apply new sensor to back of arm every 14 days. Dispense: 2 each; Refill: 11  - metoprolol succinate (TOPROL XL) 50 MG extended release tablet; Take 2 tablets by mouth daily  Dispense: 30 tablet; Refill: 2        Requested Prescriptions     Signed Prescriptions Disp Refills    Continuous Blood Gluc Sensor (FREESTYLE NETO 2 SENSOR) MISC 2 each 11     Sig: Apply new sensor to back of arm every 14 days. metoprolol succinate (TOPROL XL) 50 MG extended release tablet 30 tablet 2     Sig: Take 2 tablets by mouth daily       Medications Discontinued During This Encounter   Medication Reason    metoprolol succinate (TOPROL XL) 50 MG extended release tablet        Raul received counseling on the following healthy behaviors: nutrition, exercise and medication adherence    Discussed use,benefit, and side effects of prescribed medications. Barriers to medication compliance addressed. All patient questions answered. Pt voiced understanding. Return in about 1 month (around 10/30/2022) for follow up, HTN. Disclaimer: Some orall of this note was transcribed using voice-recognition software. This may cause typographical errors occasionally. Although all effort is made to fix these errors, please do not hesitate to contact our office if there Pavel Radford concern with the understanding of this note.

## 2022-09-30 NOTE — PROGRESS NOTES
Visit Information    Have you changed or started any medications since your last visit including any over-the-counter medicines, vitamins, or herbal medicines? no   Are you having any side effects from any of your medications? -  no  Have you stopped taking any of your medications? Is so, why? -  no    Have you seen any other physician or provider since your last visit? No  Have you had any other diagnostic tests since your last visit? No  Have you been seen in the emergency room and/or had an admission to a hospital since we last saw you? No  Have you had your routine dental cleaning in the past 6 months? no    Have you activated your [employer] account? If not, what are your barriers?  Yes     Patient Care Team:  Anabella Naranjo MD as PCP - General (Family Medicine)    Medical History Review  Past Medical, Family, and Social History reviewed and does not contribute to the patient presenting condition    Health Maintenance   Topic Date Due    COVID-19 Vaccine (1) Never done    Diabetic retinal exam  Never done    Colorectal Cancer Screen  Never done    Hepatitis B vaccine (2 of 4 - 4-dose series) 05/10/2019    Shingles vaccine (2 of 2) 02/01/2020    Pneumococcal 0-64 years Vaccine (2 - PCV) 04/12/2020    Annual Wellness Visit (AWV)  Never done    Diabetic microalbuminuria test  02/12/2022    Lipids  02/12/2022    Flu vaccine (1) 08/01/2022    Diabetic foot exam  11/10/2022    A1C test (Diabetic or Prediabetic)  12/15/2022    Depression Screen  05/20/2023    DTaP/Tdap/Td vaccine (2 - Td or Tdap) 08/10/2028    Hepatitis C screen  Completed    HIV screen  Completed    Hepatitis A vaccine  Aged Out    Hib vaccine  Aged Out    Meningococcal (ACWY) vaccine  Aged Out

## 2022-09-30 NOTE — PATIENT INSTRUCTIONS
Thank you for letting us take care of you today. We hope all your questions were addressed. If a question was overlooked or something else comes to mind after you return home, please contact a member of your Care Team listed below. Your Care Team at [place] [address] is Team #1  Britney Triana MD (Faculty)  Nikki Poole MD(Resident)  Germaine Mcdonald MD (Resident)  Eddie Roblero DO (Resident)  Brittany Perez, CRISTIANE Carpio., CRISTIANE Pickard., DEYANIRA Hudson., Delia Toledo., Samantha [facility] office)  Amaris Rivera, [address] (Clinical Practice Manager)  Prosper Noel [facility] (Clinical Pharmacist)     Office phone number: [phone]    If you need to get in right away due to illness, please be advised we have \"Same Day\" appointments available Monday-Friday. Please call us at [phone] option #3 to schedule your \"Same Day\" appointment.

## 2022-10-03 DIAGNOSIS — I10 ESSENTIAL HYPERTENSION: ICD-10-CM

## 2022-10-03 DIAGNOSIS — Z79.4 TYPE 2 DIABETES MELLITUS WITH COMPLICATION, WITH LONG-TERM CURRENT USE OF INSULIN (HCC): ICD-10-CM

## 2022-10-03 DIAGNOSIS — E11.8 TYPE 2 DIABETES MELLITUS WITH COMPLICATION, WITH LONG-TERM CURRENT USE OF INSULIN (HCC): ICD-10-CM

## 2022-10-03 RX ORDER — METOPROLOL SUCCINATE 50 MG/1
100 TABLET, EXTENDED RELEASE ORAL DAILY
Qty: 30 TABLET | Refills: 2 | Status: SHIPPED | OUTPATIENT
Start: 2022-10-03 | End: 2022-11-17

## 2022-10-03 NOTE — TELEPHONE ENCOUNTER
Patient contacted office and would need the metoprolol to be sent to [facility] on [address] and the QTY was only written for a 15 day supplies. Please send new RX with updated QTY to [facility] on [address]. Thank you.  Writer spoked to Pharmacist at [facility] and informed to cancel)

## 2022-10-07 ENCOUNTER — TELEPHONE (OUTPATIENT)
Dept: FAMILY MEDICINE CLINIC | Age: 52
End: 2022-10-07

## 2022-10-07 ENCOUNTER — CLINICAL DOCUMENTATION (OUTPATIENT)
Dept: FAMILY MEDICINE CLINIC | Age: 52
End: 2022-10-07

## 2022-10-07 DIAGNOSIS — Z79.4 TYPE 2 DIABETES MELLITUS WITH OTHER SPECIFIED COMPLICATION, WITH LONG-TERM CURRENT USE OF INSULIN (HCC): Primary | ICD-10-CM

## 2022-10-07 DIAGNOSIS — E11.69 TYPE 2 DIABETES MELLITUS WITH OTHER SPECIFIED COMPLICATION, WITH LONG-TERM CURRENT USE OF INSULIN (HCC): Primary | ICD-10-CM

## 2022-10-07 NOTE — TELEPHONE ENCOUNTER
Medication Management Service ([address] [place] [address])  [facility]  [phone]       CLINICAL PHARMACY NOTE:      This author followed-up with [address] in [facility] ([phone]) on 10/7/2022 to confirm that they were able to process the prescription for sensors that was sent by Donald Rubin MD on 9/30/22 and ultimately send new Rian 2 sensors to the patient. Spoke with Konstantin Reid from [place]: She confirmed that they received a script and were able to process and fill Rian 2 sensors. Konstantin Reid stated that 2 sensors were dispensed by the pharmacy on Monday October 3 and delivered to the patient on Tuesday October 4, 2022. Of note, patient independently applied a Rian 2 sample in the office on 9/30/22 and will be due to apply a new sensor on 10/14/22. Patient will follow-up with Endocrinology for diabetes management per Dr. Sharmaine Tsai note from 9/30/2022.     Ariane Herrera, PharmD, [facility]  PGY2 Ambulatory Care Pharmacy Resident   [facility]) Medication Management Service  [phone]  10/7/2022  9:45 AM

## 2022-10-07 NOTE — TELEPHONE ENCOUNTER
Writer left vm for patient to contact office to [facility] appt for today per provider to discuss medications.

## 2022-10-07 NOTE — PROGRESS NOTES
Medication Management Service ([address] [place] [address])  [facility]  [phone]       CLINICAL PHARMACY NOTE:      Patient was originally scheduled to have an appointment with Meds Management on 9/30/22 at 10 am to review his LibreView Report and to apply a new Rian 2 sensor. However, the patient cancelled this appointment with Meds Management. Patient also had a primary care appointment with Darylene Saupe, MD on 9/30/2022 at 3:15 pm.  Meds Management met with the patient prior to his appointment on 9/30/22 with Dr. Britta Vo. At his 9/16/22 appointment with Meds Management, Alvarado Cassi 2 orders were sent to [employer] (a DME company). Patient reported at today's visit (9/30/22) that he was told by [employer] that they could not fill this order due to his insurance. After discussion with the patient's insurance, it was determined by Paola Ochoa PharmD that a prescription would have to be sent to a [facility] in [facility]. A prescription for Alvarado Cassi 2 sensors was sent by Dr. Britta Vo. Receipt of this prescription was confirmed by the pharmacy on 9/30/22. As the patient could not receive sensors from [address] [place] [address] and because he was due for a new sensor, a Rian 2 sensor from the office was used. A Rian 2 sensor was applied to the upper arm by the patient using both skin tac and overlay bandage. Sample Lot# [id number]  Expiration- 11/30/2022. Sensor was in the warm up period when patient left the office. The patient will be due for a new sensor in 14 days (10/14/2022). Glucose readings were briefly reviewed with patient on his phone using the Alvarado Cassi 2 application. Of note, Meds Management has not been referred for Diabetes Management under a Collaborative Practice Agreement. Patient will follow-up with Endocrinology in 2 weeks per Dr. Layne Brittle note from 9/30/2022.     Patient has a follow up primary care appointment with Darylene Saupe, MD on 10/31/2022 at 2:45 pm.    Pharmacy will sign off at this time as prescriptions for Triston Clonts 2 sensors have been sent by Kb Katz MD to [address] [place] [address], the patient has been able to independently apply the sensor twice in the office, and because patient will follow-up with endocrinology for diabetes management. This Clement Tovar will follow-up with [address] [place] [address] to confirm that they were able to process and send Triston Clonts 2 sensors to the patient.     Wen Lam, PharmD, [address]  PGY2 Ambulatory Care Pharmacy Resident   [facility]) Medication Management Service  [phone]  10/7/2022  9:34 AM

## 2022-10-07 NOTE — TELEPHONE ENCOUNTER
Attempted to call patient multiple times to discuss BP medications and confirm whatever medications he is actually taking from his med list with no answer. Left a voice massage asking patient to call back and schedule a sooner appointment than 10/31 to adjust his BP medication.

## 2022-10-10 DIAGNOSIS — Z79.4 TYPE 2 DIABETES MELLITUS WITH COMPLICATION, WITH LONG-TERM CURRENT USE OF INSULIN (HCC): ICD-10-CM

## 2022-10-10 DIAGNOSIS — G62.9 NEUROPATHY: ICD-10-CM

## 2022-10-10 DIAGNOSIS — I10 ESSENTIAL HYPERTENSION: ICD-10-CM

## 2022-10-10 DIAGNOSIS — R07.89 CHEST PAIN, ATYPICAL: ICD-10-CM

## 2022-10-10 DIAGNOSIS — E11.8 TYPE 2 DIABETES MELLITUS WITH COMPLICATION, WITH LONG-TERM CURRENT USE OF INSULIN (HCC): ICD-10-CM

## 2022-10-10 DIAGNOSIS — R10.13 DYSPEPSIA: ICD-10-CM

## 2022-10-10 DIAGNOSIS — E11.621 DIABETIC ULCER OF TOE OF RIGHT FOOT ASSOCIATED WITH TYPE 2 DIABETES MELLITUS, WITH FAT LAYER EXPOSED (HCC): ICD-10-CM

## 2022-10-10 DIAGNOSIS — L97.512 DIABETIC ULCER OF TOE OF RIGHT FOOT ASSOCIATED WITH TYPE 2 DIABETES MELLITUS, WITH FAT LAYER EXPOSED (HCC): ICD-10-CM

## 2022-10-10 NOTE — TELEPHONE ENCOUNTER
----- Message from Isamar Lepe sent at 10/10/2022  9:54 AM EDT -----  Subject: Refill Request    QUESTIONS  Name of Medication? aspirin (RA ASPIRIN EC) 81 MG EC tablet  Patient-reported dosage and instructions? take 1 tablet by mouth once   daily  How many days do you have left? 0  [facility]erred [facility]? [facility]  Pharmacy phone number (if available)? [phone]  ---------------------------------------------------------------------------  --------------,  Name of Medication? atorvastatin (LIPITOR) 40 MG tablet  Patient-reported dosage and instructions? Take 1 tablet by mouth daily  How many days do you have left? 0  Preferred [facility]? [facility]  Pharmacy phone number (if available)? [phone]  ---------------------------------------------------------------------------  --------------,  Name of Medication? HYDROcodone-acetaminophen (NORCO) 5-325 MG per tablet  Patient-reported dosage and instructions? Take 1 tablet by mouth every 6   hours as needed for Pain for up to 7 days. Intended supply? 7 days. Take   lowest dose possible to manage pain  How many days do you have left? 0  Preferred [facility]? [facility]  Pharmacy phone number (if available)? [phone]  ---------------------------------------------------------------------------  --------------,  Name of Medication? famotidine (PEPCID) 40 MG tablet  Patient-reported dosage and instructions? take 1/2 tablet by mouth every   evening  How many days do you have left? 0  Preferred [facility]? [facility]  Pharmacy phone number (if available)? [phone]  ---------------------------------------------------------------------------  --------------,  Name of Medication? gabapentin (NEURONTIN) 600 MG tablet  Patient-reported dosage and instructions? Take 1 tablet by mouth 3 times   daily for 120 days. How many days do you have left? 0  Preferred [facility]? [facility]  Pharmacy phone number (if available)? [phone]  ---------------------------------------------------------------------------  --------------,  Name of Medication? lisinopril (PRINIVIL;ZESTRIL) 40 MG tablet  Patient-reported dosage and instructions? Take 1 tablet by mouth daily  How many days do you have left? 0  Preferred [facility]? [facility]  Pharmacy phone number (if available)? [phone]  ---------------------------------------------------------------------------  --------------,  Name of Medication? nitroGLYCERIN (NITROSTAT) 0.4 MG SL tablet  Patient-reported dosage and instructions? Place 1 tablet under the tongue   every 5 minutes as needed for Chest pain (chest pain) up to max of 3 total   doses. If no relief after 1 dose, call 911. How many days do you have left? 0  Preferred [facility]? [facility]  Pharmacy phone number (if available)? [phone]  ---------------------------------------------------------------------------  --------------  CALL BACK INFO  What is the best way for the office to contact you? OK to leave message on   voicemail  Preferred Call Back Phone Number? [phone]  ---------------------------------------------------------------------------  --------------  SCRIPT ANSWERS  Relationship to Patient?  Self

## 2022-10-10 NOTE — TELEPHONE ENCOUNTER
Please address the medication refill and close the encounter. If I can be of assistance, please route to the applicable pool. Thank you. Last visit: 9-30-22  Last Med refill: 9-15-22  Does patient have enough medication for 72 hours: Yes    Next Visit Date:  Future Appointments   Date Time Provider Kaley Ocampo   10/31/2022  2:45 PM Pancho Maciel MD [address] [place] [address] Maintenance   Topic Date Due    COVID-19 Vaccine (1) Never done    Diabetic retinal exam  Never done    Colorectal Cancer Screen  Never done    Hepatitis B vaccine (2 of 3 - Risk 3-dose series) 05/10/2019    Shingles vaccine (2 of 2) 02/01/2020    Pneumococcal 0-64 years Vaccine (2 - PCV) 04/12/2020    Annual Wellness Visit (AWV)  Never done    Diabetic microalbuminuria test  02/12/2022    Lipids  02/12/2022    Flu vaccine (1) 08/01/2022    Diabetic foot exam  11/10/2022    A1C test (Diabetic or Prediabetic)  12/15/2022    Depression Screen  09/30/2023    DTaP/Tdap/Td vaccine (2 - Td or Tdap) 08/10/2028    Hepatitis C screen  Completed    HIV screen  Completed    Hepatitis A vaccine  Aged Out    Hib vaccine  Aged Out    Meningococcal (ACWY) vaccine  Aged Out       Hemoglobin A1C (%)   Date Value   09/15/2022 12.1   05/20/2022 11.4   01/29/2019 11.6 (H)             ( goal A1C is < 7)   Microalb/Crt.  Ratio (mcg/mg creat)   Date Value   02/12/2021 32 (H)     LDL Cholesterol (mg/dL)   Date Value   02/12/2021 46   02/12/2021 46       (goal LDL is <100)   AST (U/L)   Date Value   02/12/2021 26   02/12/2021 26     ALT (U/L)   Date Value   02/12/2021 27   02/12/2021 27     BUN (mg/dL)   Date Value   11/12/2021 13     BP Readings from Last 3 Encounters:   09/30/22 (!) 152/93   09/15/22 (!) 144/84   05/20/22 126/88          (goal 120/80)    All Future Testing planned in [facility]  Lab Frequency Next Occurrence   NM MYOCARDIAL SPECT REST EXERCISE OR RX Once 10/14/2022   [place] (For External Results Only) Once 11/29/2022   NM MYOCARDIAL SPECT REST EXERCISE OR RX Once 12/27/2022   Lipid Panel Once 96/27/2346   Basic Metabolic Panel Once 35/10/2795   Microalbumin, Ur Once 05/20/2022               Patient Active Problem List:     Type 2 diabetes mellitus with complication, with long-term current use of insulin (HCC)     Essential hypertension     Atypical chest pain     Need for prophylactic vaccination and inoculation against varicella     Allergic rhinitis     Pain in right toe(s)     Foot infection     Diabetic ulcer of toe of right foot associated with type 2 diabetes mellitus, with fat layer exposed ([address]     Ulcer of foot due to secondary diabetes ([address]     Ulcer of right foot, limited to breakdown of skin ([address]     Chest pain on breathing     Neuropathy     Hyperlipidemia     Diabetes mellitus ([address]

## 2022-10-12 ENCOUNTER — TELEPHONE (OUTPATIENT)
Dept: FAMILY MEDICINE CLINIC | Age: 52
End: 2022-10-12

## 2022-10-12 NOTE — TELEPHONE ENCOUNTER
Information for Provider? Pt called for status of his meds. Let him know   we were waiting for his PCP's response. Patient medication been pending for 2 days since 10-.

## 2022-10-17 RX ORDER — GABAPENTIN 600 MG/1
600 TABLET ORAL 3 TIMES DAILY
Qty: 90 TABLET | Refills: 3 | Status: SHIPPED | OUTPATIENT
Start: 2022-10-17 | End: 2023-02-14

## 2022-10-17 RX ORDER — ATORVASTATIN CALCIUM 40 MG/1
40 TABLET, FILM COATED ORAL DAILY
Qty: 90 TABLET | Refills: 1 | Status: SHIPPED | OUTPATIENT
Start: 2022-10-17

## 2022-10-17 RX ORDER — HYDROCODONE BITARTRATE AND ACETAMINOPHEN 5; 325 MG/1; MG/1
1 TABLET ORAL EVERY 6 HOURS PRN
Qty: 28 TABLET | Refills: 0 | OUTPATIENT
Start: 2022-10-17 | End: 2022-10-24

## 2022-10-17 RX ORDER — NITROGLYCERIN 0.4 MG/1
0.4 TABLET SUBLINGUAL EVERY 5 MIN PRN
Qty: 25 TABLET | Refills: 3 | Status: SHIPPED | OUTPATIENT
Start: 2022-10-17

## 2022-10-17 RX ORDER — ASPIRIN 81 MG/1
TABLET ORAL
Qty: 60 TABLET | Refills: 3 | Status: SHIPPED | OUTPATIENT
Start: 2022-10-17

## 2022-10-17 RX ORDER — LISINOPRIL 40 MG/1
40 TABLET ORAL DAILY
Qty: 90 TABLET | Refills: 1 | Status: SHIPPED | OUTPATIENT
Start: 2022-10-17

## 2022-10-17 RX ORDER — FAMOTIDINE 40 MG/1
TABLET, FILM COATED ORAL
Qty: 30 TABLET | Refills: 2 | Status: SHIPPED | OUTPATIENT
Start: 2022-10-17

## 2022-11-18 DIAGNOSIS — I10 ESSENTIAL HYPERTENSION: ICD-10-CM

## 2022-11-18 DIAGNOSIS — Z79.4 TYPE 2 DIABETES MELLITUS WITH COMPLICATION, WITH LONG-TERM CURRENT USE OF INSULIN (HCC): ICD-10-CM

## 2022-11-18 DIAGNOSIS — E11.8 TYPE 2 DIABETES MELLITUS WITH COMPLICATION, WITH LONG-TERM CURRENT USE OF INSULIN (HCC): ICD-10-CM

## 2022-11-18 RX ORDER — METOPROLOL SUCCINATE 50 MG/1
TABLET, EXTENDED RELEASE ORAL
Qty: 30 TABLET | Refills: 2 | Status: SHIPPED | OUTPATIENT
Start: 2022-11-18

## 2022-11-18 NOTE — TELEPHONE ENCOUNTER
E-scribe request for med refill. Please review and e-scribe if applicable. Last Visit Date:  09/30/2022  Next Visit Date:  Visit date not found    Hemoglobin A1C (%)   Date Value   09/15/2022 12.1   05/20/2022 11.4   01/29/2019 11.6 (H)             ( goal A1C is < 7)   Microalb/Crt.  Ratio (mcg/mg creat)   Date Value   02/12/2021 32 (H)     LDL Cholesterol (mg/dL)   Date Value   02/12/2021 46   02/12/2021 46       (goal LDL is <100)   AST (U/L)   Date Value   02/12/2021 26   02/12/2021 26     ALT (U/L)   Date Value   02/12/2021 27   02/12/2021 27     BUN (mg/dL)   Date Value   11/12/2021 13     BP Readings from Last 3 Encounters:   09/30/22 (!) 152/93   09/15/22 (!) 144/84   05/20/22 126/88          (goal 120/80)        Patient Active Problem List:     Type 2 diabetes mellitus with complication, with long-term current use of insulin (HCC)     Essential hypertension     Atypical chest pain     Need for prophylactic vaccination and inoculation against varicella     Allergic rhinitis     Pain in right toe(s)     Foot infection     Diabetic ulcer of toe of right foot associated with type 2 diabetes mellitus, with fat layer exposed ([address]     Ulcer of foot due to secondary diabetes ([address]     Ulcer of right foot, limited to breakdown of skin ([address]     Chest pain on breathing     Neuropathy     Hyperlipidemia     Diabetes mellitus ([address]      ----Jossue Anglin

## 2023-01-06 DIAGNOSIS — E11.8 TYPE 2 DIABETES MELLITUS WITH COMPLICATION, WITH LONG-TERM CURRENT USE OF INSULIN (HCC): ICD-10-CM

## 2023-01-06 DIAGNOSIS — I10 ESSENTIAL HYPERTENSION: ICD-10-CM

## 2023-01-06 DIAGNOSIS — Z79.4 TYPE 2 DIABETES MELLITUS WITH COMPLICATION, WITH LONG-TERM CURRENT USE OF INSULIN (HCC): ICD-10-CM

## 2023-01-06 RX ORDER — METOPROLOL SUCCINATE 50 MG/1
TABLET, EXTENDED RELEASE ORAL
Qty: 30 TABLET | Refills: 0 | Status: SHIPPED | OUTPATIENT
Start: 2023-01-06 | End: 2023-01-25

## 2023-01-06 NOTE — TELEPHONE ENCOUNTER
E-scribe request for med refill. Please review and e-scribe if applicable. Last Visit Date:  09/30/2022  Next Visit Date:  Visit date not found    Hemoglobin A1C (%)   Date Value   09/15/2022 12.1   05/20/2022 11.4   01/29/2019 11.6 (H)             ( goal A1C is < 7)   Microalb/Crt.  Ratio (mcg/mg creat)   Date Value   02/12/2021 32 (H)     LDL Cholesterol (mg/dL)   Date Value   02/12/2021 46   02/12/2021 46       (goal LDL is <100)   AST (U/L)   Date Value   02/12/2021 26   02/12/2021 26     ALT (U/L)   Date Value   02/12/2021 27   02/12/2021 27     BUN (mg/dL)   Date Value   11/12/2021 13     BP Readings from Last 3 Encounters:   09/30/22 (!) 152/93   09/15/22 (!) 144/84   05/20/22 126/88          (goal 120/80)        Patient Active Problem List:     Type 2 diabetes mellitus with complication, with long-term current use of insulin (HCC)     Essential hypertension     Atypical chest pain     Need for prophylactic vaccination and inoculation against varicella     Allergic rhinitis     Pain in right toe(s)     Foot infection     Diabetic ulcer of toe of right foot associated with type 2 diabetes mellitus, with fat layer exposed ([address]     Ulcer of foot due to secondary diabetes ([address]     Ulcer of right foot, limited to breakdown of skin ([address]     Chest pain on breathing     Neuropathy     Hyperlipidemia     Diabetes mellitus ([address]      ----Ronni Rodrigez

## 2023-01-25 DIAGNOSIS — Z79.4 TYPE 2 DIABETES MELLITUS WITH COMPLICATION, WITH LONG-TERM CURRENT USE OF INSULIN (HCC): ICD-10-CM

## 2023-01-25 DIAGNOSIS — E11.8 TYPE 2 DIABETES MELLITUS WITH COMPLICATION, WITH LONG-TERM CURRENT USE OF INSULIN (HCC): ICD-10-CM

## 2023-01-25 DIAGNOSIS — I10 ESSENTIAL HYPERTENSION: ICD-10-CM

## 2023-01-25 RX ORDER — METOPROLOL SUCCINATE 50 MG/1
TABLET, EXTENDED RELEASE ORAL
Qty: 30 TABLET | Refills: 0 | Status: SHIPPED | OUTPATIENT
Start: 2023-01-25

## 2023-02-10 DIAGNOSIS — Z79.4 TYPE 2 DIABETES MELLITUS WITH COMPLICATION, WITH LONG-TERM CURRENT USE OF INSULIN (HCC): ICD-10-CM

## 2023-02-10 DIAGNOSIS — I10 ESSENTIAL HYPERTENSION: ICD-10-CM

## 2023-02-10 DIAGNOSIS — E11.8 TYPE 2 DIABETES MELLITUS WITH COMPLICATION, WITH LONG-TERM CURRENT USE OF INSULIN (HCC): ICD-10-CM

## 2023-02-10 RX ORDER — METOPROLOL SUCCINATE 50 MG/1
TABLET, EXTENDED RELEASE ORAL
Qty: 30 TABLET | Refills: 0 | Status: SHIPPED | OUTPATIENT
Start: 2023-02-10

## 2023-02-10 NOTE — TELEPHONE ENCOUNTER
Metoprolol Succ. Pending refill         Health Maintenance   Topic Date Due    COVID-19 Vaccine (1) Never done    Diabetic retinal exam  Never done    Colorectal Cancer Screen  Never done    Hepatitis B vaccine (2 of 3 - Risk 3-dose series) 05/10/2019    Shingles vaccine (2 of 2) 02/01/2020    Pneumococcal 0-64 years Vaccine (2 - PCV) 04/12/2020    Annual Wellness Visit (AWV)  Never done    Diabetic Alb to Cr ratio (uACR) test  02/12/2022    Lipids  02/12/2022    Flu vaccine (1) 08/01/2022    Diabetic foot exam  11/10/2022    GFR test (Diabetes, CKD 3-4, OR last GFR 15-59)  11/12/2022    A1C test (Diabetic or Prediabetic)  12/15/2022    Depression Screen  09/30/2023    DTaP/Tdap/Td vaccine (2 - Td or Tdap) 08/10/2028    Hepatitis C screen  Completed    HIV screen  Completed    Hepatitis A vaccine  Aged Out    Hib vaccine  Aged Out    Meningococcal (ACWY) vaccine  Aged Out             (applicable per patient's age: Cancer Screenings, Depression Screening, Fall Risk Screening, Immunizations)    Hemoglobin A1C (%)   Date Value   09/15/2022 12.1   05/20/2022 11.4   01/29/2019 11.6 (H)     Microalb/Crt. Ratio (mcg/mg creat)   Date Value   02/12/2021 32 (H)     LDL Cholesterol (mg/dL)   Date Value   02/12/2021 46   02/12/2021 46     AST (U/L)   Date Value   02/12/2021 26   02/12/2021 26     ALT (U/L)   Date Value   02/12/2021 27   02/12/2021 27     BUN (mg/dL)   Date Value   11/12/2021 13      (goal A1C is < 7)   (goal LDL is <100) need 30-50% reduction from baseline     BP Readings from Last 3 Encounters:   09/30/22 (!) 152/93   09/15/22 (!) 144/84   05/20/22 126/88    (goal /80)      All Future Testing planned in [facility]:  Lab Frequency Next Occurrence   Lipid Panel Once 04/40/3633   Basic Metabolic Panel Once 31/82/6313   Microalbumin, Ur Once 05/20/2022       Next Visit Date:  No future appointments.          Patient Active Problem List:     Type 2 diabetes mellitus with complication, with long-term current use of insulin ([address]     Essential hypertension     Atypical chest pain     Need for prophylactic vaccination and inoculation against varicella     Allergic rhinitis     Pain in right toe(s)     Foot infection     Diabetic ulcer of toe of right foot associated with type 2 diabetes mellitus, with fat layer exposed ([address]     Ulcer of foot due to secondary diabetes ([address]     Ulcer of right foot, limited to breakdown of skin ([address]     Chest pain on breathing     Neuropathy     Hyperlipidemia     Diabetes mellitus ([address]

## 2023-03-03 DIAGNOSIS — R10.13 DYSPEPSIA: ICD-10-CM

## 2023-03-03 RX ORDER — FAMOTIDINE 40 MG/1
TABLET, FILM COATED ORAL
Qty: 30 TABLET | Refills: 2 | Status: SHIPPED | OUTPATIENT
Start: 2023-03-03

## 2023-03-03 NOTE — TELEPHONE ENCOUNTER
E-scribe request for med refills. Please review and e-scribe if applicable. Last Visit Date:  9/30/22  Next Visit Date:  Visit date not found    Hemoglobin A1C (%)   Date Value   09/15/2022 12.1   05/20/2022 11.4   01/29/2019 11.6 (H)             ( goal A1C is < 7)   Microalb/Crt.  Ratio (mcg/mg creat)   Date Value   02/12/2021 32 (H)     LDL Cholesterol (mg/dL)   Date Value   02/12/2021 46   02/12/2021 46       (goal LDL is <100)   AST (U/L)   Date Value   02/12/2021 26   02/12/2021 26     ALT (U/L)   Date Value   02/12/2021 27   02/12/2021 27     BUN (mg/dL)   Date Value   11/12/2021 13     BP Readings from Last 3 Encounters:   09/30/22 (!) 152/93   09/15/22 (!) 144/84   05/20/22 126/88          (goal 120/80)        Patient Active Problem List:     Type 2 diabetes mellitus with complication, with long-term current use of insulin (HCC)     Essential hypertension     Atypical chest pain     Need for prophylactic vaccination and inoculation against varicella     Allergic rhinitis     Pain in right toe(s)     Foot infection     Diabetic ulcer of toe of right foot associated with type 2 diabetes mellitus, with fat layer exposed ([address]     Ulcer of foot due to secondary diabetes ([address]     Ulcer of right foot, limited to breakdown of skin ([address]     Chest pain on breathing     Neuropathy     Hyperlipidemia     Diabetes mellitus ([address]      ----Rockne Battles

## 2023-03-15 ENCOUNTER — OFFICE VISIT (OUTPATIENT)
Dept: FAMILY MEDICINE CLINIC | Age: 53
End: 2023-03-15
Payer: MEDICARE

## 2023-03-15 VITALS
WEIGHT: 260.4 LBS | HEIGHT: 72 IN | BODY MASS INDEX: 35.27 KG/M2 | SYSTOLIC BLOOD PRESSURE: 158 MMHG | DIASTOLIC BLOOD PRESSURE: 93 MMHG | HEART RATE: 80 BPM

## 2023-03-15 DIAGNOSIS — Z79.4 TYPE 2 DIABETES MELLITUS WITH COMPLICATION, WITH LONG-TERM CURRENT USE OF INSULIN (HCC): ICD-10-CM

## 2023-03-15 DIAGNOSIS — M79.89 LEG SWELLING: ICD-10-CM

## 2023-03-15 DIAGNOSIS — R07.9 CHEST PAIN, UNSPECIFIED TYPE: ICD-10-CM

## 2023-03-15 DIAGNOSIS — Z13.220 SCREENING FOR HYPERLIPIDEMIA: ICD-10-CM

## 2023-03-15 DIAGNOSIS — G47.00 INSOMNIA, UNSPECIFIED TYPE: ICD-10-CM

## 2023-03-15 DIAGNOSIS — R07.89 CHEST PAIN, ATYPICAL: ICD-10-CM

## 2023-03-15 DIAGNOSIS — E66.01 SEVERE OBESITY (BMI 35.0-39.9) WITH COMORBIDITY (HCC): ICD-10-CM

## 2023-03-15 DIAGNOSIS — I10 ESSENTIAL HYPERTENSION: Primary | ICD-10-CM

## 2023-03-15 DIAGNOSIS — E11.8 TYPE 2 DIABETES MELLITUS WITH COMPLICATION, WITH LONG-TERM CURRENT USE OF INSULIN (HCC): ICD-10-CM

## 2023-03-15 PROCEDURE — 3078F DIAST BP <80 MM HG: CPT | Performed by: STUDENT IN AN ORGANIZED HEALTH CARE EDUCATION/TRAINING PROGRAM

## 2023-03-15 PROCEDURE — 3046F HEMOGLOBIN A1C LEVEL >9.0%: CPT | Performed by: STUDENT IN AN ORGANIZED HEALTH CARE EDUCATION/TRAINING PROGRAM

## 2023-03-15 PROCEDURE — 1036F TOBACCO NON-USER: CPT | Performed by: STUDENT IN AN ORGANIZED HEALTH CARE EDUCATION/TRAINING PROGRAM

## 2023-03-15 PROCEDURE — 99211 OFF/OP EST MAY X REQ PHY/QHP: CPT | Performed by: STUDENT IN AN ORGANIZED HEALTH CARE EDUCATION/TRAINING PROGRAM

## 2023-03-15 PROCEDURE — G8427 DOCREV CUR MEDS BY ELIG CLIN: HCPCS | Performed by: STUDENT IN AN ORGANIZED HEALTH CARE EDUCATION/TRAINING PROGRAM

## 2023-03-15 PROCEDURE — G8484 FLU IMMUNIZE NO ADMIN: HCPCS | Performed by: STUDENT IN AN ORGANIZED HEALTH CARE EDUCATION/TRAINING PROGRAM

## 2023-03-15 PROCEDURE — G8417 CALC BMI ABV UP PARAM F/U: HCPCS | Performed by: STUDENT IN AN ORGANIZED HEALTH CARE EDUCATION/TRAINING PROGRAM

## 2023-03-15 PROCEDURE — 3074F SYST BP LT 130 MM HG: CPT | Performed by: STUDENT IN AN ORGANIZED HEALTH CARE EDUCATION/TRAINING PROGRAM

## 2023-03-15 PROCEDURE — 3017F COLORECTAL CA SCREEN DOC REV: CPT | Performed by: STUDENT IN AN ORGANIZED HEALTH CARE EDUCATION/TRAINING PROGRAM

## 2023-03-15 PROCEDURE — 2022F DILAT RTA XM EVC RTNOPTHY: CPT | Performed by: STUDENT IN AN ORGANIZED HEALTH CARE EDUCATION/TRAINING PROGRAM

## 2023-03-15 PROCEDURE — 99213 OFFICE O/P EST LOW 20 MIN: CPT | Performed by: STUDENT IN AN ORGANIZED HEALTH CARE EDUCATION/TRAINING PROGRAM

## 2023-03-15 RX ORDER — LANOLIN ALCOHOL/MO/W.PET/CERES
3 CREAM (GRAM) TOPICAL DAILY
Qty: 30 TABLET | Refills: 3 | Status: SHIPPED | OUTPATIENT
Start: 2023-03-15

## 2023-03-15 RX ORDER — NITROGLYCERIN 0.4 MG/1
0.4 TABLET SUBLINGUAL EVERY 5 MIN PRN
Qty: 25 TABLET | Refills: 3 | Status: SHIPPED | OUTPATIENT
Start: 2023-03-15

## 2023-03-15 RX ORDER — HYDROCHLOROTHIAZIDE 25 MG/1
25 TABLET ORAL EVERY MORNING
Qty: 30 TABLET | Refills: 0 | Status: SHIPPED | OUTPATIENT
Start: 2023-03-15

## 2023-03-15 RX ORDER — METOPROLOL SUCCINATE 50 MG/1
50 TABLET, EXTENDED RELEASE ORAL DAILY
Qty: 30 TABLET | Refills: 2 | Status: SHIPPED | OUTPATIENT
Start: 2023-03-15

## 2023-03-15 SDOH — ECONOMIC STABILITY: HOUSING INSECURITY
IN THE LAST 12 MONTHS, WAS THERE A TIME WHEN YOU DID NOT HAVE A STEADY PLACE TO SLEEP OR SLEPT IN A SHELTER (INCLUDING NOW)?: NO

## 2023-03-15 SDOH — ECONOMIC STABILITY: FOOD INSECURITY: WITHIN THE PAST 12 MONTHS, YOU WORRIED THAT YOUR FOOD WOULD RUN OUT BEFORE YOU GOT MONEY TO BUY MORE.: NEVER TRUE

## 2023-03-15 SDOH — ECONOMIC STABILITY: FOOD INSECURITY: WITHIN THE PAST 12 MONTHS, THE FOOD YOU BOUGHT JUST DIDN'T LAST AND YOU DIDN'T HAVE MONEY TO GET MORE.: NEVER TRUE

## 2023-03-15 SDOH — ECONOMIC STABILITY: INCOME INSECURITY: HOW HARD IS IT FOR YOU TO PAY FOR THE VERY BASICS LIKE FOOD, HOUSING, MEDICAL CARE, AND HEATING?: NOT HARD AT ALL

## 2023-03-15 ASSESSMENT — ENCOUNTER SYMPTOMS
DIARRHEA: 0
ABDOMINAL PAIN: 0
COLOR CHANGE: 0
NAUSEA: 0
SHORTNESS OF BREATH: 0
CHEST TIGHTNESS: 0
COUGH: 0
VOMITING: 0
CONSTIPATION: 0
BACK PAIN: 0

## 2023-03-15 ASSESSMENT — PATIENT HEALTH QUESTIONNAIRE - PHQ9
SUM OF ALL RESPONSES TO PHQ QUESTIONS 1-9: 1
2. FEELING DOWN, DEPRESSED OR HOPELESS: 1
SUM OF ALL RESPONSES TO PHQ QUESTIONS 1-9: 1
1. LITTLE INTEREST OR PLEASURE IN DOING THINGS: 0
SUM OF ALL RESPONSES TO PHQ9 QUESTIONS 1 & 2: 1
SUM OF ALL RESPONSES TO PHQ QUESTIONS 1-9: 1
SUM OF ALL RESPONSES TO PHQ QUESTIONS 1-9: 1

## 2023-03-15 NOTE — PATIENT INSTRUCTIONS
Thank you for letting us take care of you today. We hope all your questions were addressed. If a question was overlooked or something else comes to mind after you return home, please contact a member of your Care Team listed below. Your Care Team at [place] [address] is Team #1  Penny Sullivan MD (Faculty)  Genaro Pickard MD(Resident)  Faith Angelo MD (Resident)  Kaci Lee DO (Resident)  Jonathan Leblanc, CRISTIANE Connor., CRISTIANE Story., DEYANIRA Gan., Harriet Mendoza., [facility] office)  Jose Alberto Clark, [address] (Clinical Practice Manager)  Arthur Mattson [facility] (Clinical Pharmacist)     Office phone number: [phone]    If you need to get in right away due to illness, please be advised we have \"Same Day\" appointments available Monday-Friday. Please call us at [phone] option #3 to schedule your \"Same Day\" appointment.

## 2023-03-16 NOTE — PROGRESS NOTES
Attending Physician Statement    Wt Readings from Last 3 Encounters:   03/15/23 260 lb 6.4 oz (118.1 kg)   09/30/22 264 lb 3.2 oz (119.8 kg)   09/15/22 264 lb 6.4 oz (119.9 kg)     Temp Readings from Last 3 Encounters:   09/15/22 98 °F (36.7 °C) (Temporal)   05/20/22 97.2 °F (36.2 °C)   11/12/21 98.7 °F (37.1 °C) (Oral)     BP Readings from Last 3 Encounters:   03/15/23 (!) 158/93   09/30/22 (!) 152/93   09/15/22 (!) 144/84     Pulse Readings from Last 3 Encounters:   03/15/23 80   09/30/22 87   09/15/22 96         I have discussed the care of Dennys Reynoso, including pertinent history and exam findings with the resident. I have reviewed the key elements of all parts of the encounter with the resident. I agree with the assessment, plan and orders as documented by the resident.   (GE Modifier)
Diabetic visit information    BP Readings from Last 3 Encounters:   09/30/22 (!) 152/93   09/15/22 (!) 144/84   05/20/22 126/88       Hemoglobin A1C (%)   Date Value   09/15/2022 12.1   05/20/2022 11.4   01/29/2019 11.6 (H)     Microalb/Crt. Ratio (mcg/mg creat)   Date Value   02/12/2021 32 (H)     LDL Cholesterol (mg/dL)   Date Value   02/12/2021 46   02/12/2021 46               Have you changed or started any medications since your last visit including any over-the-counter medicines, vitamins, or herbal medicines? no   Have you stopped taking any of your medications? Is so, why? -  no  Are you having any side effects from any of your medications? - no    Have you seen any other physician or provider since your last visit?  no   Have you had any other diagnostic tests since your last visit?  no   Have you been seen in the emergency room and/or had an admission in a hospital since we last saw you?  no     Have you had your annual diabetic retinal (eye) exam? End of 2022  (ensure copy of exam is in the chart)    Have you had your routine dental cleaning in the past 6 months? no    Do you have an active [facility] account? If not, what are your barriers? Yes    Patient Care Team:  Tierney Worthington MD as PCP - General (Family Medicine)    Medical history Review  Past Medical, Family, and Social History reviewed and does contribute to the patient presenting condition.     Health Maintenance   Topic Date Due    COVID-19 Vaccine (1) Never done    Diabetic retinal exam  Never done    Colorectal Cancer Screen  Never done    Hepatitis B vaccine (2 of 3 - Risk 3-dose series) 05/10/2019    Shingles vaccine (2 of 2) 02/01/2020    Pneumococcal 0-64 years Vaccine (2 - PCV) 04/12/2020    Annual Wellness Visit (AWV)  Never done    Diabetic Alb to Cr ratio (uACR) test  02/12/2022    Lipids  02/12/2022    Flu vaccine (1) 08/01/2022    Diabetic foot exam  11/10/2022    GFR test (Diabetes, CKD 3-4, OR last GFR 15-59)  11/12/2022    A1C
hydrochlorothiazide. Counseled patient regarding DASH diet, exercise, weight loss and medication compliance. We will follow-up closely with patient in a week. - hydroCHLOROthiazide (HYDRODIURIL) 25 MG tablet; Take 1 tablet by mouth every morning  Dispense: 30 tablet; Refill: 0  - metoprolol succinate (TOPROL XL) 50 MG extended release tablet; Take 1 tablet by mouth daily  Dispense: 30 tablet; Refill: 2  - Basic Metabolic Panel; Future    2. Severe obesity (BMI 35.0-39. 9) with comorbidity ([facility]  Counseled patient regarding weight loss. 3. Type 2 diabetes mellitus with complication, with long-term current use of insulin ([facility]  Follows up with endocrinology, however DM is uncontrolled. - Basic Metabolic Panel; Future  - Microalbumin, Ur; Future    4. Chest pain, unspecified type  Intermittent chest pain, patient has been using nitro stat very frequently and he ran out his many refills. Likely Cardiologic in origin. Will check EKG, echo and stress test.    Counseled patient heavily if he developed any chest pain to go to the emergency department immediately.    - NM MYOCARDIAL SPECT REST EXERCISE OR RX; Future  - EKG 12 lead; Future  - ECHO 2D WO Color Doppler Complete; Future    5. Screening for hyperlipidemia    - Lipid Panel; Future    6. Leg swelling    - ECHO 2D WO Color Doppler Complete; Future    7. Insomnia, unspecified type  Counseled patient on sleep hygiene. Ordered melatonin.           Requested Prescriptions     Signed Prescriptions Disp Refills    hydroCHLOROthiazide (HYDRODIURIL) 25 MG tablet 30 tablet 0     Sig: Take 1 tablet by mouth every morning    metoprolol succinate (TOPROL XL) 50 MG extended release tablet 30 tablet 2     Sig: Take 1 tablet by mouth daily    melatonin (RA MELATONIN) 3 MG TABS tablet 30 tablet 3     Sig: Take 1 tablet by mouth daily       Medications Discontinued During This Encounter   Medication Reason    Continuous Blood Gluc  (FREESTYLE NETO 2 READER) DWAYNE

## 2023-04-07 DIAGNOSIS — I10 ESSENTIAL HYPERTENSION: ICD-10-CM

## 2023-04-07 DIAGNOSIS — Z79.4 TYPE 2 DIABETES MELLITUS WITH COMPLICATION, WITH LONG-TERM CURRENT USE OF INSULIN (HCC): ICD-10-CM

## 2023-04-07 DIAGNOSIS — E11.8 TYPE 2 DIABETES MELLITUS WITH COMPLICATION, WITH LONG-TERM CURRENT USE OF INSULIN (HCC): ICD-10-CM

## 2023-04-07 RX ORDER — METOPROLOL SUCCINATE 50 MG/1
50 TABLET, EXTENDED RELEASE ORAL DAILY
Qty: 30 TABLET | Refills: 2 | OUTPATIENT
Start: 2023-04-07

## 2023-05-01 ENCOUNTER — HOSPITAL ENCOUNTER (OUTPATIENT)
Dept: NUCLEAR MEDICINE | Age: 53
Discharge: HOME | End: 2023-05-03
Payer: MEDICARE

## 2023-05-01 ENCOUNTER — HOSPITAL ENCOUNTER (OUTPATIENT)
Dept: NON INVASIVE DIAGNOSTICS | Age: 53
Discharge: HOME | End: 2023-05-03
Payer: MEDICARE

## 2023-05-01 VITALS — DIASTOLIC BLOOD PRESSURE: 73 MMHG | HEART RATE: 85 BPM | SYSTOLIC BLOOD PRESSURE: 148 MMHG

## 2023-05-01 DIAGNOSIS — M79.89 LEG SWELLING: ICD-10-CM

## 2023-05-01 DIAGNOSIS — R07.9 CHEST PAIN, UNSPECIFIED TYPE: ICD-10-CM

## 2023-05-01 LAB
LV EF: 50 %
LV EF: 53 %
LVEF MODALITY: NORMAL
LVEF MODALITY: NORMAL

## 2023-05-01 PROCEDURE — A9500 TC99M SESTAMIBI: HCPCS | Performed by: STUDENT IN AN ORGANIZED HEALTH CARE EDUCATION/TRAINING PROGRAM

## 2023-05-01 PROCEDURE — 93017 CV STRESS TEST TRACING ONLY: CPT

## 2023-05-01 PROCEDURE — 2580000003 HC RX 258: Performed by: STUDENT IN AN ORGANIZED HEALTH CARE EDUCATION/TRAINING PROGRAM

## 2023-05-01 PROCEDURE — 6360000002 HC RX W HCPCS: Performed by: STUDENT IN AN ORGANIZED HEALTH CARE EDUCATION/TRAINING PROGRAM

## 2023-05-01 PROCEDURE — 78452 HT MUSCLE IMAGE SPECT MULT: CPT

## 2023-05-01 PROCEDURE — 3430000000 HC RX DIAGNOSTIC RADIOPHARMACEUTICAL: Performed by: STUDENT IN AN ORGANIZED HEALTH CARE EDUCATION/TRAINING PROGRAM

## 2023-05-01 PROCEDURE — 93306 TTE W/DOPPLER COMPLETE: CPT

## 2023-05-01 RX ORDER — ALBUTEROL SULFATE 90 UG/1
2 AEROSOL, METERED RESPIRATORY (INHALATION) PRN
Status: DISCONTINUED | OUTPATIENT
Start: 2023-05-01 | End: 2023-05-01 | Stop reason: ALTCHOICE

## 2023-05-01 RX ORDER — AMINOPHYLLINE DIHYDRATE 25 MG/ML
50 INJECTION, SOLUTION INTRAVENOUS PRN
Status: DISCONTINUED | OUTPATIENT
Start: 2023-05-01 | End: 2023-05-01 | Stop reason: ALTCHOICE

## 2023-05-01 RX ORDER — ATROPINE SULFATE 0.1 MG/ML
0.5 INJECTION INTRAVENOUS EVERY 5 MIN PRN
Status: DISCONTINUED | OUTPATIENT
Start: 2023-05-01 | End: 2023-05-01 | Stop reason: ALTCHOICE

## 2023-05-01 RX ORDER — METOPROLOL TARTRATE 5 MG/5ML
5 INJECTION INTRAVENOUS EVERY 5 MIN PRN
Status: DISCONTINUED | OUTPATIENT
Start: 2023-05-01 | End: 2023-05-01 | Stop reason: ALTCHOICE

## 2023-05-01 RX ORDER — SODIUM CHLORIDE 0.9 % (FLUSH) 0.9 %
10 SYRINGE (ML) INJECTION ONCE
Status: COMPLETED | OUTPATIENT
Start: 2023-05-01 | End: 2023-05-01

## 2023-05-01 RX ORDER — TETRAKIS(2-METHOXYISOBUTYLISOCYANIDE)COPPER(I) TETRAFLUOROBORATE 1 MG/ML
11.36 INJECTION, POWDER, LYOPHILIZED, FOR SOLUTION INTRAVENOUS
Status: COMPLETED | OUTPATIENT
Start: 2023-05-01 | End: 2023-05-01

## 2023-05-01 RX ORDER — NITROGLYCERIN 0.4 MG/1
0.4 TABLET SUBLINGUAL EVERY 5 MIN PRN
Status: DISCONTINUED | OUTPATIENT
Start: 2023-05-01 | End: 2023-05-01 | Stop reason: ALTCHOICE

## 2023-05-01 RX ORDER — SODIUM CHLORIDE 0.9 % (FLUSH) 0.9 %
5-40 SYRINGE (ML) INJECTION PRN
Status: DISCONTINUED | OUTPATIENT
Start: 2023-05-01 | End: 2023-05-01 | Stop reason: ALTCHOICE

## 2023-05-01 RX ORDER — TETRAKIS(2-METHOXYISOBUTYLISOCYANIDE)COPPER(I) TETRAFLUOROBORATE 1 MG/ML
36 INJECTION, POWDER, LYOPHILIZED, FOR SOLUTION INTRAVENOUS
Status: COMPLETED | OUTPATIENT
Start: 2023-05-01 | End: 2023-05-01

## 2023-05-01 RX ORDER — SODIUM CHLORIDE 9 MG/ML
500 INJECTION, SOLUTION INTRAVENOUS CONTINUOUS PRN
Status: DISCONTINUED | OUTPATIENT
Start: 2023-05-01 | End: 2023-05-01 | Stop reason: ALTCHOICE

## 2023-05-01 RX ADMIN — TETRAKIS(2-METHOXYISOBUTYLISOCYANIDE)COPPER(I) TETRAFLUOROBORATE 34.9 MILLICURIE: 1 INJECTION, POWDER, LYOPHILIZED, FOR SOLUTION INTRAVENOUS at 09:10

## 2023-05-01 RX ADMIN — SODIUM CHLORIDE, PRESERVATIVE FREE 20 ML: 5 INJECTION INTRAVENOUS at 09:03

## 2023-05-01 RX ADMIN — TETRAKIS(2-METHOXYISOBUTYLISOCYANIDE)COPPER(I) TETRAFLUOROBORATE 11.36 MILLICURIE: 1 INJECTION, POWDER, LYOPHILIZED, FOR SOLUTION INTRAVENOUS at 08:23

## 2023-05-01 RX ADMIN — REGADENOSON 0.4 MG: 0.08 INJECTION, SOLUTION INTRAVENOUS at 09:11

## 2023-05-01 RX ADMIN — SODIUM CHLORIDE, PRESERVATIVE FREE 10 ML: 5 INJECTION INTRAVENOUS at 09:10

## 2023-05-01 RX ADMIN — SODIUM CHLORIDE, PRESERVATIVE FREE 10 ML: 5 INJECTION INTRAVENOUS at 08:24

## 2023-05-01 NOTE — PROGRESS NOTES
Patient present for lexiscan stress test. Educated on procedure. All questions/concerns addressed. Allergies and medication reviewed. Patient prepped for procedure. Stress test will be supervised by TIGIST Oneil.

## 2023-05-01 NOTE — PROGRESS NOTES
Lexiscan stress test completed and reviewed by TIGIST Sheridan. Patient experienced mild symptoms but tolerated test well. PO caffeine provided 2 minutes after injection while in recovery and symptoms resolved. VS returned to baseline, see flow sheets for documented VS throughout procedure.

## 2023-05-10 DIAGNOSIS — I10 ESSENTIAL HYPERTENSION: ICD-10-CM

## 2023-05-10 NOTE — TELEPHONE ENCOUNTER
E-scribe request for med refills. Please review and e-scribe if applicable. Last Visit Date:  3/15/23  Next Visit Date:  Visit date not found    Hemoglobin A1C (%)   Date Value   09/15/2022 12.1   05/20/2022 11.4   01/29/2019 11.6 (H)             ( goal A1C is < 7)   Microalb/Crt. Ratio (mcg/mg creat)   Date Value   02/12/2021 32 (H)     LDL Cholesterol (mg/dL)   Date Value   02/12/2021 46   02/12/2021 46       (goal LDL is <100)   AST (U/L)   Date Value   02/12/2021 26   02/12/2021 26     ALT (U/L)   Date Value   02/12/2021 27   02/12/2021 27     BUN (mg/dL)   Date Value   11/12/2021 13     BP Readings from Last 3 Encounters:   05/01/23 (!) 148/73   03/15/23 (!) 158/93   09/30/22 (!) 152/93          (goal 120/80)        Patient Active Problem List:     Type 2 diabetes mellitus with complication, with long-term current use of insulin (HCC)     Essential hypertension     Chest pain, atypical     Need for prophylactic vaccination and inoculation against varicella     Allergic rhinitis     Pain in right toe(s)     Foot infection     Diabetic ulcer of toe of right foot associated with type 2 diabetes mellitus, with fat layer exposed ([address]     Ulcer of foot due to secondary diabetes ([address]     Ulcer of right foot, limited to breakdown of skin (HCC)     Chest pain on breathing     Neuropathy     Hyperlipidemia     Diabetes mellitus (HCC)     Severe obesity (BMI 35.0-39. 9) with comorbidity ([address]      ----Ceci Diaz

## 2023-05-11 RX ORDER — HYDROCHLOROTHIAZIDE 25 MG/1
25 TABLET ORAL EVERY MORNING
Qty: 30 TABLET | Refills: 0 | Status: SHIPPED | OUTPATIENT
Start: 2023-05-11

## 2023-06-02 ENCOUNTER — OFFICE VISIT (OUTPATIENT)
Dept: FAMILY MEDICINE CLINIC | Age: 53
End: 2023-06-02
Payer: MEDICARE

## 2023-06-02 VITALS
WEIGHT: 259 LBS | BODY MASS INDEX: 35.12 KG/M2 | DIASTOLIC BLOOD PRESSURE: 83 MMHG | HEART RATE: 88 BPM | SYSTOLIC BLOOD PRESSURE: 134 MMHG

## 2023-06-02 DIAGNOSIS — I10 ESSENTIAL HYPERTENSION: ICD-10-CM

## 2023-06-02 DIAGNOSIS — M75.81 RIGHT ROTATOR CUFF TENDINITIS: ICD-10-CM

## 2023-06-02 DIAGNOSIS — M77.11 RIGHT LATERAL EPICONDYLITIS: ICD-10-CM

## 2023-06-02 DIAGNOSIS — Z12.11 COLON CANCER SCREENING: ICD-10-CM

## 2023-06-02 DIAGNOSIS — R20.0 RIGHT UPPER EXTREMITY NUMBNESS: Primary | ICD-10-CM

## 2023-06-02 DIAGNOSIS — E11.65 UNCONTROLLED TYPE 2 DIABETES MELLITUS WITH HYPERGLYCEMIA (HCC): ICD-10-CM

## 2023-06-02 DIAGNOSIS — G62.9 NEUROPATHY: ICD-10-CM

## 2023-06-02 PROCEDURE — 3017F COLORECTAL CA SCREEN DOC REV: CPT | Performed by: FAMILY MEDICINE

## 2023-06-02 PROCEDURE — 3074F SYST BP LT 130 MM HG: CPT | Performed by: FAMILY MEDICINE

## 2023-06-02 PROCEDURE — G8417 CALC BMI ABV UP PARAM F/U: HCPCS | Performed by: FAMILY MEDICINE

## 2023-06-02 PROCEDURE — 99214 OFFICE O/P EST MOD 30 MIN: CPT | Performed by: FAMILY MEDICINE

## 2023-06-02 PROCEDURE — 3046F HEMOGLOBIN A1C LEVEL >9.0%: CPT | Performed by: FAMILY MEDICINE

## 2023-06-02 PROCEDURE — 3078F DIAST BP <80 MM HG: CPT | Performed by: FAMILY MEDICINE

## 2023-06-02 PROCEDURE — 2022F DILAT RTA XM EVC RTNOPTHY: CPT | Performed by: FAMILY MEDICINE

## 2023-06-02 PROCEDURE — G8427 DOCREV CUR MEDS BY ELIG CLIN: HCPCS | Performed by: FAMILY MEDICINE

## 2023-06-02 PROCEDURE — 1036F TOBACCO NON-USER: CPT | Performed by: FAMILY MEDICINE

## 2023-06-07 ASSESSMENT — ENCOUNTER SYMPTOMS
RESPIRATORY NEGATIVE: 1
BACK PAIN: 0

## 2023-06-08 DIAGNOSIS — E11.8 TYPE 2 DIABETES MELLITUS WITH COMPLICATION, WITH LONG-TERM CURRENT USE OF INSULIN (HCC): ICD-10-CM

## 2023-06-08 DIAGNOSIS — I10 ESSENTIAL HYPERTENSION: ICD-10-CM

## 2023-06-08 DIAGNOSIS — Z79.4 TYPE 2 DIABETES MELLITUS WITH COMPLICATION, WITH LONG-TERM CURRENT USE OF INSULIN (HCC): ICD-10-CM

## 2023-06-08 NOTE — TELEPHONE ENCOUNTER
E-scribe request for med refills. Please review and e-scribe if applicable. Last Visit Date:  6/2/23  Next Visit Date:  7/3/2023    Hemoglobin A1C (%)   Date Value   09/15/2022 12.1   05/20/2022 11.4   01/29/2019 11.6 (H)             ( goal A1C is < 7)   Microalb/Crt. Ratio (mcg/mg creat)   Date Value   02/12/2021 32 (H)     LDL Cholesterol (mg/dL)   Date Value   02/12/2021 46   02/12/2021 46       (goal LDL is <100)   AST (U/L)   Date Value   02/12/2021 26   02/12/2021 26     ALT (U/L)   Date Value   02/12/2021 27   02/12/2021 27     BUN (mg/dL)   Date Value   11/12/2021 13     BP Readings from Last 3 Encounters:   06/02/23 134/83   05/01/23 (!) 148/73   03/15/23 (!) 158/93          (goal 120/80)        Patient Active Problem List:     Type 2 diabetes mellitus with complication, with long-term current use of insulin (HCC)     Essential hypertension     Chest pain, atypical     Need for prophylactic vaccination and inoculation against varicella     Allergic rhinitis     Pain in right toe(s)     Foot infection     Diabetic ulcer of toe of right foot associated with type 2 diabetes mellitus, with fat layer exposed ([address]     Ulcer of foot due to secondary diabetes ([address]     Ulcer of right foot, limited to breakdown of skin (HCC)     Chest pain on breathing     Neuropathy     Hyperlipidemia     Diabetes mellitus (HCC)     Severe obesity (BMI 35.0-39. 9) with comorbidity ([address]      ----Lew Liz

## 2023-06-09 DIAGNOSIS — I10 ESSENTIAL HYPERTENSION: ICD-10-CM

## 2023-06-09 RX ORDER — METOPROLOL SUCCINATE 50 MG/1
TABLET, EXTENDED RELEASE ORAL
Qty: 30 TABLET | Refills: 2 | Status: SHIPPED | OUTPATIENT
Start: 2023-06-09

## 2023-06-09 RX ORDER — HYDROCHLOROTHIAZIDE 25 MG/1
25 TABLET ORAL EVERY MORNING
Qty: 30 TABLET | Refills: 0 | Status: SHIPPED | OUTPATIENT
Start: 2023-06-09

## 2023-06-09 NOTE — TELEPHONE ENCOUNTER
Last visit: 6/2/23  Last Med refill:   Does patient have enough medication for 72 hours: No:     Next Visit Date:  Future Appointments   Date Time Provider Kaley Ocampo   7/3/2023 10:00 AM Kyra Bland MD [address] [place] [address] Maintenance   Topic Date Due    COVID-19 Vaccine (1) Never done    Diabetic retinal exam  Never done    Colorectal Cancer Screen  Never done    Hepatitis B vaccine (2 of 3 - Risk 3-dose series) 05/10/2019    Shingles vaccine (2 of 2) 02/01/2020    Pneumococcal 0-64 years Vaccine (2 - PCV) 04/12/2020    Annual Wellness Visit (AWV)  Never done    Diabetic Alb to Cr ratio (uACR) test  02/12/2022    Lipids  02/12/2022    Diabetic foot exam  11/10/2022    GFR test (Diabetes, CKD 3-4, OR last GFR 15-59)  11/12/2022    A1C test (Diabetic or Prediabetic)  12/15/2022    Flu vaccine (Season Ended) 08/01/2023    Depression Screen  03/15/2024    DTaP/Tdap/Td vaccine (2 - Td or Tdap) 08/10/2028    Hepatitis C screen  Completed    HIV screen  Completed    Hepatitis A vaccine  Aged Out    Hib vaccine  Aged Out    Meningococcal (ACWY) vaccine  Aged Out       Hemoglobin A1C (%)   Date Value   09/15/2022 12.1   05/20/2022 11.4   01/29/2019 11.6 (H)             ( goal A1C is < 7)   Microalb/Crt.  Ratio (mcg/mg creat)   Date Value   02/12/2021 32 (H)     LDL Cholesterol (mg/dL)   Date Value   02/12/2021 46   02/12/2021 46       (goal LDL is <100)   AST (U/L)   Date Value   02/12/2021 26   02/12/2021 26     ALT (U/L)   Date Value   02/12/2021 27   02/12/2021 27     BUN (mg/dL)   Date Value   11/12/2021 13     BP Readings from Last 3 Encounters:   06/02/23 134/83   05/01/23 (!) 148/73   03/15/23 (!) 158/93          (goal 120/80)    All Future Testing planned in [facility]  Lab Frequency Next Occurrence   Basic Metabolic Panel Once 42/34/4633   Lipid Panel Once 03/15/2023   Microalbumin, Ur Once 24/98/6268   Basic Metabolic Panel Once 36/03/8717   Microalbumin, Ur Once 06/02/2023   Lipid Panel Once

## 2023-07-05 DIAGNOSIS — E78.2 MIXED HYPERLIPIDEMIA: Primary | ICD-10-CM

## 2023-07-05 RX ORDER — ATORVASTATIN CALCIUM 40 MG/1
TABLET, FILM COATED ORAL
Qty: 90 TABLET | Refills: 1 | Status: SHIPPED | OUTPATIENT
Start: 2023-07-05

## 2023-07-05 NOTE — TELEPHONE ENCOUNTER
E-scribe request for LIPITOR 40 MG TAB. Please review and e-scribe if applicable. Last Visit Date:  6/2/2023  Next Visit Date:  8/14/2023    Hemoglobin A1C (%)   Date Value   09/15/2022 12.1   05/20/2022 11.4   01/29/2019 11.6 (H)             ( goal A1C is < 7)   Microalb/Crt. Ratio (mcg/mg creat)   Date Value   02/12/2021 32 (H)     LDL Cholesterol (mg/dL)   Date Value   02/12/2021 46   02/12/2021 46       (goal LDL is <100)   AST (U/L)   Date Value   02/12/2021 26   02/12/2021 26     ALT (U/L)   Date Value   02/12/2021 27   02/12/2021 27     BUN (mg/dL)   Date Value   11/12/2021 13     BP Readings from Last 3 Encounters:   06/02/23 134/83   05/01/23 (!) 148/73   03/15/23 (!) 158/93          (goal 120/80)        Patient Active Problem List:     Type 2 diabetes mellitus with complication, with long-term current use of insulin (HCC)     Essential hypertension     Chest pain, atypical     Need for prophylactic vaccination and inoculation against varicella     Allergic rhinitis     Pain in right toe(s)     Foot infection     Diabetic ulcer of toe of right foot associated with type 2 diabetes mellitus, with fat layer exposed ([address])     Ulcer of foot due to secondary diabetes ([address])     Ulcer of right foot, limited to breakdown of skin (HCC)     Chest pain on breathing     Neuropathy     Hyperlipidemia     Diabetes mellitus (HCC)     Severe obesity (BMI 35.0-39. 9) with comorbidity ([address])      ----JF

## 2023-07-05 NOTE — TELEPHONE ENCOUNTER
Will refill patient statin medication at this time for 90 days with 1 refill. Patient is due for repeat Lipid panel as his last panel was in 2021. Repeat Lipid panel ordered. Patient was also to follow up in July re: his RUE/Acute on chronic shoulder pains as well as Diabetes. Patient will need to update lipid panel by the end of the year or we will need to consider shorter supply of medication as patient dosage may not be appropriate if LDL > 70. MA team to request if patient would like closer follow up for his right shoulder and/or Diabetes prior to his wellness visit in August.  MA team to notify patient we will not be able to address these issues during his wellness visit but will address diet/exercise/preventive needs. MA team to also let patient know of ordered labs/the above and let writer know if any questions or concerns.

## 2023-07-07 DIAGNOSIS — E11.8 TYPE 2 DIABETES MELLITUS WITH COMPLICATION, WITH LONG-TERM CURRENT USE OF INSULIN (HCC): ICD-10-CM

## 2023-07-07 DIAGNOSIS — G62.9 NEUROPATHY: ICD-10-CM

## 2023-07-07 DIAGNOSIS — Z79.4 TYPE 2 DIABETES MELLITUS WITH COMPLICATION, WITH LONG-TERM CURRENT USE OF INSULIN (HCC): ICD-10-CM

## 2023-07-08 DIAGNOSIS — I10 ESSENTIAL HYPERTENSION: ICD-10-CM

## 2023-07-10 RX ORDER — GABAPENTIN 600 MG/1
TABLET ORAL
Qty: 90 TABLET | Refills: 3 | OUTPATIENT
Start: 2023-07-10

## 2023-07-10 RX ORDER — HYDROCHLOROTHIAZIDE 25 MG/1
25 TABLET ORAL EVERY MORNING
Qty: 30 TABLET | Refills: 0 | Status: SHIPPED | OUTPATIENT
Start: 2023-07-10

## 2023-07-10 NOTE — TELEPHONE ENCOUNTER
E-scribe request for GABAPENTIN 600 MG TAB. Please review and e-scribe if applicable. Last Visit Date:  6/2/2023  Next Visit Date:  8/14/2023    Hemoglobin A1C (%)   Date Value   09/15/2022 12.1   05/20/2022 11.4   01/29/2019 11.6 (H)             ( goal A1C is < 7)   Microalb/Crt. Ratio (mcg/mg creat)   Date Value   02/12/2021 32 (H)     LDL Cholesterol (mg/dL)   Date Value   02/12/2021 46   02/12/2021 46       (goal LDL is <100)   AST (U/L)   Date Value   02/12/2021 26   02/12/2021 26     ALT (U/L)   Date Value   02/12/2021 27   02/12/2021 27     BUN (mg/dL)   Date Value   11/12/2021 13     BP Readings from Last 3 Encounters:   06/02/23 134/83   05/01/23 (!) 148/73   03/15/23 (!) 158/93          (goal 120/80)        Patient Active Problem List:     Type 2 diabetes mellitus with complication, with long-term current use of insulin (HCC)     Essential hypertension     Chest pain, atypical     Need for prophylactic vaccination and inoculation against varicella     Allergic rhinitis     Pain in right toe(s)     Foot infection     Diabetic ulcer of toe of right foot associated with type 2 diabetes mellitus, with fat layer exposed ([address])     Ulcer of foot due to secondary diabetes ([address])     Ulcer of right foot, limited to breakdown of skin (HCC)     Chest pain on breathing     Neuropathy     Hyperlipidemia     Diabetes mellitus (HCC)     Severe obesity (BMI 35.0-39. 9) with comorbidity ([address])      ----JF

## 2023-07-10 NOTE — TELEPHONE ENCOUNTER
E-scribe request for HYDROCHLOROTHIAZIDE 25 MG. Please review and e-scribe if applicable. Last Visit Date:  6/2/2023  Next Visit Date:  8/14/2023    Hemoglobin A1C (%)   Date Value   09/15/2022 12.1   05/20/2022 11.4   01/29/2019 11.6 (H)             ( goal A1C is < 7)   Microalb/Crt. Ratio (mcg/mg creat)   Date Value   02/12/2021 32 (H)     LDL Cholesterol (mg/dL)   Date Value   02/12/2021 46   02/12/2021 46       (goal LDL is <100)   AST (U/L)   Date Value   02/12/2021 26   02/12/2021 26     ALT (U/L)   Date Value   02/12/2021 27   02/12/2021 27     BUN (mg/dL)   Date Value   11/12/2021 13     BP Readings from Last 3 Encounters:   06/02/23 134/83   05/01/23 (!) 148/73   03/15/23 (!) 158/93          (goal 120/80)        Patient Active Problem List:     Type 2 diabetes mellitus with complication, with long-term current use of insulin (HCC)     Essential hypertension     Chest pain, atypical     Need for prophylactic vaccination and inoculation against varicella     Allergic rhinitis     Pain in right toe(s)     Foot infection     Diabetic ulcer of toe of right foot associated with type 2 diabetes mellitus, with fat layer exposed ([address])     Ulcer of foot due to secondary diabetes ([address])     Ulcer of right foot, limited to breakdown of skin (HCC)     Chest pain on breathing     Neuropathy     Hyperlipidemia     Diabetes mellitus (HCC)     Severe obesity (BMI 35.0-39. 9) with comorbidity ([address])      ----JF

## 2023-08-22 ENCOUNTER — TELEPHONE (OUTPATIENT)
Dept: FAMILY MEDICINE CLINIC | Age: 53
End: 2023-08-22

## 2023-08-22 NOTE — TELEPHONE ENCOUNTER
----- Message from Ant Carreno sent at 8/21/2023  2:32 PM EDT -----  Subject: Refill Request    QUESTIONS  Name of Medication? gabapentin (NEURONTIN) 600 MG tablet  Patient-reported dosage and instructions? as directed,   How many days do you have left? 0  Preferred Pharmacy? [address]  Pharmacy phone number (if available)? [phone]  Additional Information for Provider? pt had VV set up last friday had   connection issues and tried to call but got the office was closed, set up   for in person instead and it isn't till 8/30, he has been out for a month   and asking if there is anyway he can get some medication to get him   through.   ---------------------------------------------------------------------------  --------------  [address]  What is the best way for the office to contact you? OK to leave message on   voicemail  Preferred Call Back Phone Number? [phone]  ---------------------------------------------------------------------------  --------------  SCRIPT ANSWERS  Relationship to Patient?  Self

## 2023-08-22 NOTE — TELEPHONE ENCOUNTER
Patient called left a voicemail due to previous no show appointments gabapentin will not be refilled prior to appointment, if patient has any further questions to call the office back.

## 2023-08-30 ENCOUNTER — OFFICE VISIT (OUTPATIENT)
Dept: FAMILY MEDICINE CLINIC | Age: 53
End: 2023-08-30
Payer: MEDICAID

## 2023-08-30 VITALS
BODY MASS INDEX: 35.08 KG/M2 | SYSTOLIC BLOOD PRESSURE: 116 MMHG | HEIGHT: 72 IN | WEIGHT: 259 LBS | HEART RATE: 85 BPM | DIASTOLIC BLOOD PRESSURE: 71 MMHG

## 2023-08-30 DIAGNOSIS — E11.8 TYPE 2 DIABETES MELLITUS WITH COMPLICATION, WITH LONG-TERM CURRENT USE OF INSULIN (HCC): ICD-10-CM

## 2023-08-30 DIAGNOSIS — Z79.4 TYPE 2 DIABETES MELLITUS WITH COMPLICATION, WITH LONG-TERM CURRENT USE OF INSULIN (HCC): ICD-10-CM

## 2023-08-30 DIAGNOSIS — I10 ESSENTIAL HYPERTENSION: ICD-10-CM

## 2023-08-30 DIAGNOSIS — G62.9 NEUROPATHY: ICD-10-CM

## 2023-08-30 PROCEDURE — 3074F SYST BP LT 130 MM HG: CPT

## 2023-08-30 PROCEDURE — 2022F DILAT RTA XM EVC RTNOPTHY: CPT

## 2023-08-30 PROCEDURE — 3078F DIAST BP <80 MM HG: CPT

## 2023-08-30 PROCEDURE — G8417 CALC BMI ABV UP PARAM F/U: HCPCS

## 2023-08-30 PROCEDURE — 1036F TOBACCO NON-USER: CPT

## 2023-08-30 PROCEDURE — 3046F HEMOGLOBIN A1C LEVEL >9.0%: CPT

## 2023-08-30 PROCEDURE — 99213 OFFICE O/P EST LOW 20 MIN: CPT

## 2023-08-30 PROCEDURE — 3017F COLORECTAL CA SCREEN DOC REV: CPT

## 2023-08-30 PROCEDURE — G8427 DOCREV CUR MEDS BY ELIG CLIN: HCPCS

## 2023-08-30 RX ORDER — INSULIN LISPRO 100 [IU]/ML
INJECTION, SOLUTION INTRAVENOUS; SUBCUTANEOUS
Qty: 60 ML | Refills: 2 | Status: SHIPPED | OUTPATIENT
Start: 2023-08-30

## 2023-08-30 RX ORDER — GABAPENTIN 600 MG/1
600 TABLET ORAL 3 TIMES DAILY
Qty: 90 TABLET | Refills: 0 | Status: SHIPPED | OUTPATIENT
Start: 2023-08-30 | End: 2023-12-28

## 2023-08-30 RX ORDER — METOPROLOL SUCCINATE 50 MG/1
50 TABLET, EXTENDED RELEASE ORAL DAILY
Qty: 30 TABLET | Refills: 2 | Status: SHIPPED | OUTPATIENT
Start: 2023-08-30

## 2023-08-30 RX ORDER — GLUCOSAMINE HCL/CHONDROITIN SU 500-400 MG
CAPSULE ORAL
Qty: 100 STRIP | Refills: 0 | Status: SHIPPED | OUTPATIENT
Start: 2023-08-30

## 2023-08-30 ASSESSMENT — ENCOUNTER SYMPTOMS
SHORTNESS OF BREATH: 0
WHEEZING: 0
CONSTIPATION: 0
NAUSEA: 0
COUGH: 0
DIARRHEA: 0
ABDOMINAL PAIN: 0
VOMITING: 0

## 2023-08-30 NOTE — PATIENT INSTRUCTIONS
Thank you for letting us take care of you today. We hope all your questions were addressed. If a question was overlooked or something else comes to mind after you return home, please contact a member of your Care Team listed below. Your Care Team at [address] [place] [address] is Team #2  Jessica Boland M.D. (Faculty)  Jourdan Lucas, (Resident)  Lacie Prieto, (Resident)  Demarcus Sun, (Resident)  Don Silva, (Resident)  Lashell Booth, (Resident)  Tani Bills, [address] [place] [address], [facility]  Silvia Damian,  Critical access hospital  Amanda Betancourt, [facility]  John Allan, Critical access hospital  Dayana Peterson, [facility]  Sandy Jovelal) [place], [place] ([facility])  Giovanni Prieto, [address] [place] [address] (Clinical Pharmacist)     Office phone number: [phone]    If you need to get in right away due to illness, please be advised we have \"Same Day\" appointments available Monday-Friday. Please call us at [phone] option #3 to schedule your \"Same Day\" appointment.

## 2023-09-06 ENCOUNTER — TELEPHONE (OUTPATIENT)
Dept: FAMILY MEDICINE CLINIC | Age: 53
End: 2023-09-06

## 2023-09-29 ENCOUNTER — TELEPHONE (OUTPATIENT)
Dept: FAMILY MEDICINE CLINIC | Age: 53
End: 2023-09-29

## 2023-09-29 NOTE — TELEPHONE ENCOUNTER
Patient called to reschedule the appointment on 10/16, patient was unable to be reached, appointment is cancelled at this time.

## 2023-10-10 DIAGNOSIS — G62.9 NEUROPATHY: ICD-10-CM

## 2023-10-10 DIAGNOSIS — E11.8 TYPE 2 DIABETES MELLITUS WITH COMPLICATION, WITH LONG-TERM CURRENT USE OF INSULIN (HCC): ICD-10-CM

## 2023-10-10 DIAGNOSIS — Z79.4 TYPE 2 DIABETES MELLITUS WITH COMPLICATION, WITH LONG-TERM CURRENT USE OF INSULIN (HCC): ICD-10-CM

## 2023-10-10 RX ORDER — GABAPENTIN 600 MG/1
600 TABLET ORAL 3 TIMES DAILY
Qty: 45 TABLET | Refills: 0 | Status: SHIPPED | OUTPATIENT
Start: 2023-10-10 | End: 2023-10-25

## 2023-10-10 NOTE — TELEPHONE ENCOUNTER
Last visit: 08/30/2023  Last Med refill: 08/30/2023  Does patient have enough medication for 72 hours: No:     Next Visit Date:  Future Appointments   Date Time Provider [address]   10/24/2023  4:00 PM [facility]  [facility]   10/24/2023  4:00 PM Theresa Cooper MD [address] [place] [address]   11/8/2023  1:30 PM Baldemar Olvera MD [address] [place] [address],[place] [address] Maintenance   Topic Date Due    COVID-19 Vaccine (1) Never done    Diabetic retinal exam  Never done    Colorectal Cancer Screen  Never done    Hepatitis B vaccine (2 of 3 - 19+ 3-dose series) 05/10/2019    Shingles vaccine (2 of 2) 02/01/2020    Pneumococcal 0-64 years Vaccine (2 - PCV) 04/12/2020    Diabetic Alb to Cr ratio (uACR) test  02/12/2022    Lipids  02/12/2022    Diabetic foot exam  11/10/2022    GFR test (Diabetes, CKD 3-4, OR last GFR 15-59)  11/12/2022    Flu vaccine (1) 08/01/2023    Annual Wellness Visit (AWV)  Never done    Depression Screen  03/15/2024    A1C test (Diabetic or Prediabetic)  08/31/2024    DTaP/Tdap/Td vaccine (2 - Td or Tdap) 08/10/2028    Hepatitis C screen  Completed    HIV screen  Completed    Hepatitis A vaccine  Aged Out    Hib vaccine  Aged Out    Meningococcal (ACWY) vaccine  Aged Out       Hemoglobin A1C (%)   Date Value   09/15/2022 12.1   05/20/2022 11.4   01/29/2019 11.6 (H)             ( goal A1C is < 7)   No components found for: \"LABMICR\"  LDL Cholesterol (mg/dL)   Date Value   02/12/2021 46   02/12/2021 46       (goal LDL is <100)   AST (U/L)   Date Value   02/12/2021 26   02/12/2021 26     ALT (U/L)   Date Value   02/12/2021 27   02/12/2021 27     BUN (mg/dL)   Date Value   11/12/2021 13     BP Readings from Last 3 Encounters:   08/30/23 116/71   06/02/23 134/83   05/01/23 (!) 148/73          (goal 120/80)    All Future Testing planned in [facility]  Lab Frequency Next Occurrence   Basic Metabolic Panel Once 84/42/0181   Lipid Panel Once 03/15/2023   Microalbumin, Ur Once 03/15/2023   Basic

## 2023-10-10 NOTE — TELEPHONE ENCOUNTER
Chart reviewed due to request for medication refill. Patient requesting refill of gabapentin. Per PDMP patient is last refill was on August 30, 2023 for 30 days and patient should have been done with medication at the end of September and currently it is October 10th. Patient should have been out of medication for the last 10 days. Patient does not have any appointment with writer or member of office until November 8th. Writer will provide 15 days worth of medication as patient was to follow-up by the end of September for continued gabapentin prescription. MA team to call patient and arrange for a sooner appointment to discuss gabapentin. Patient is able to see any of the resident physicians as well or soonest available. Please let writer know if any questions or concerns.

## 2023-10-10 NOTE — TELEPHONE ENCOUNTER
----- Message from [employer] sent at 10/10/2023 11:56 AM EDT -----  Subject: Refill Request    QUESTIONS  Name of Medication? gabapentin (NEURONTIN) 600 MG tablet  Patient-reported dosage and instructions? 600 mg, 3 times a day  How many days do you have left? 7  Preferred Pharmacy? [address]  Pharmacy phone number (if available)? [phone]  Additional Information for Provider? Patient has an appt with Dr. Hayden Ramsey   on 11/08. Please honor refill. Call patient to confirm. Thank you.  ---------------------------------------------------------------------------  --------------  CALL BACK INFO  What is the best way for the office to contact you? OK to leave message on   voicemail  Preferred Call Back Phone Number? [phone]  ---------------------------------------------------------------------------  --------------  SCRIPT ANSWERS  Relationship to Patient?  Self

## 2023-10-11 DIAGNOSIS — I10 ESSENTIAL HYPERTENSION: ICD-10-CM

## 2023-10-11 RX ORDER — LISINOPRIL 40 MG/1
40 TABLET ORAL DAILY
Qty: 30 TABLET | Refills: 0 | Status: SHIPPED | OUTPATIENT
Start: 2023-10-11 | End: 2023-11-10

## 2023-10-11 NOTE — TELEPHONE ENCOUNTER
Chart reviewed again per medication refill request.  Patient has been requested for sooner follow up than November 8th. BMP and Microalbumin also still pending and incomplete per current review of records. Writer requesting these to be completed as soon as possible or prior to visit within 15 days in setting of Gabapentin request (see previous medication refill request). MA team to assist patient in scheduling closer follow up and obtaining the above labs. MA team to let writer know if any questions or concerns.

## 2023-10-11 NOTE — TELEPHONE ENCOUNTER
Last visit: 08/30/23  Last Med refill: 07/08/23  Does patient have enough medication for 72 hours: No: PATIENT HAS SCHEDULED APPT.  ON 11/08/23    Next Visit Date:  Future Appointments   Date Time Provider [address]   10/24/2023  4:00 PM [facility]  [facility]   10/24/2023  4:00 PM Atiya Denson MD [facility]   11/8/2023  1:30 PM Duane Parr, MD [address] [place] [address],[place] [address] Maintenance   Topic Date Due    COVID-19 Vaccine (1) Never done    Diabetic retinal exam  Never done    Colorectal Cancer Screen  Never done    Hepatitis B vaccine (2 of 3 - 19+ 3-dose series) 05/10/2019    Shingles vaccine (2 of 2) 02/01/2020    Pneumococcal 0-64 years Vaccine (2 - PCV) 04/12/2020    Diabetic Alb to Cr ratio (uACR) test  02/12/2022    Lipids  02/12/2022    Diabetic foot exam  11/10/2022    GFR test (Diabetes, CKD 3-4, OR last GFR 15-59)  11/12/2022    Flu vaccine (1) 08/01/2023    Annual Wellness Visit (AWV)  Never done    Depression Screen  03/15/2024    A1C test (Diabetic or Prediabetic)  08/31/2024    DTaP/Tdap/Td vaccine (2 - Td or Tdap) 08/10/2028    Hepatitis C screen  Completed    HIV screen  Completed    Hepatitis A vaccine  Aged Out    Hib vaccine  Aged Out    Meningococcal (ACWY) vaccine  Aged Out       Hemoglobin A1C (%)   Date Value   09/15/2022 12.1   05/20/2022 11.4   01/29/2019 11.6 (H)             ( goal A1C is < 7)   No components found for: \"LABMICR\"  LDL Cholesterol (mg/dL)   Date Value   02/12/2021 46   02/12/2021 46       (goal LDL is <100)   AST (U/L)   Date Value   02/12/2021 26   02/12/2021 26     ALT (U/L)   Date Value   02/12/2021 27   02/12/2021 27     BUN (mg/dL)   Date Value   11/12/2021 13     BP Readings from Last 3 Encounters:   08/30/23 116/71   06/02/23 134/83   05/01/23 (!) 148/73          (goal 120/80)    All Future Testing planned in [facility]  Lab Frequency Next Occurrence   Basic Metabolic Panel Once 67/39/1091   Lipid Panel Once 03/15/2023

## 2023-11-05 DIAGNOSIS — I10 ESSENTIAL HYPERTENSION: ICD-10-CM

## 2023-11-06 RX ORDER — LISINOPRIL 40 MG/1
40 TABLET ORAL DAILY
Qty: 30 TABLET | Refills: 0 | Status: SHIPPED | OUTPATIENT
Start: 2023-11-06

## 2023-11-06 NOTE — TELEPHONE ENCOUNTER
Patient chart reviewed for patient medication refill request and review of [facility] protocol which states patient does not have any recent renal function panels done in the last 12 months. On review of patient chart and Care everywhere, last renal function was done 11/12/2021 with normal renal function and elevated blood sugar (341) and hypocalcemia (8.5). Please see refill note from 10/11/2023. BMP and Microalbumin urine study ordered on 6/2/2023 and incomplete per record. Patient also had BMP ordered in 3/2023 as well. Writer will provide patient with longer supply of medication once patient safety and protocol satisfied. MA team to notify patient of the above and request that obtain labs ASAP prior to appointment on the 8th with writer so we may review results. If renal labs stable or WNL, writer will strongly consider 90 day supply or longer pending clinical situation. Please let writer know if any questions or concerns.

## 2023-11-06 NOTE — TELEPHONE ENCOUNTER
Last visit: 8/30/23  Last Med refill: 10/11/23  Does patient have enough medication for 72 hours: No:     Next Visit Date:  Future Appointments   Date Time Provider [address]   11/8/2023  1:30 PM Duane Parr, MD [facility]   11/29/2023  1:30 PM Duane Parr, MD [facility]   3/5/2024 10:00 AM [facility]  [facility]   3/5/2024 10:00 AM Atiya Denson  [facility] Maintenance   Topic Date Due    COVID-19 Vaccine (1) Never done    Diabetic retinal exam  Never done    Colorectal Cancer Screen  Never done    Hepatitis B vaccine (2 of 3 - 19+ 3-dose series) 05/10/2019    Shingles vaccine (2 of 2) 02/01/2020    Pneumococcal 0-64 years Vaccine (2 - PCV) 04/12/2020    Diabetic Alb to Cr ratio (uACR) test  02/12/2022    Lipids  02/12/2022    Diabetic foot exam  11/10/2022    GFR test (Diabetes, CKD 3-4, OR last GFR 15-59)  11/12/2022    Flu vaccine (1) 08/01/2023    Annual Wellness Visit (AWV)  Never done    Depression Screen  03/15/2024    A1C test (Diabetic or Prediabetic)  08/31/2024    DTaP/Tdap/Td vaccine (2 - Td or Tdap) 08/10/2028    Hepatitis C screen  Completed    HIV screen  Completed    Hepatitis A vaccine  Aged Out    Hib vaccine  Aged Out    Meningococcal (ACWY) vaccine  Aged Out       Hemoglobin A1C (%)   Date Value   09/15/2022 12.1   05/20/2022 11.4   01/29/2019 11.6 (H)             ( goal A1C is < 7)   No components found for: \"LABMICR\"  LDL Cholesterol (mg/dL)   Date Value   02/12/2021 46   02/12/2021 46       (goal LDL is <100)   AST (U/L)   Date Value   02/12/2021 26   02/12/2021 26     ALT (U/L)   Date Value   02/12/2021 27   02/12/2021 27     BUN (mg/dL)   Date Value   11/12/2021 13     BP Readings from Last 3 Encounters:   08/30/23 116/71   06/02/23 134/83   05/01/23 (!) 148/73          (goal 120/80)    All Future Testing planned in [facility]  Lab Frequency Next Occurrence   Basic Metabolic Panel Once 39/56/7835   Lipid Panel Once

## 2023-11-08 ENCOUNTER — TELEPHONE (OUTPATIENT)
Dept: INTERNAL MEDICINE | Age: 53
End: 2023-11-08

## 2023-11-08 NOTE — TELEPHONE ENCOUNTER
----- Message from Mariahmiriam Elizabeth sent at 11/8/2023  1:08 PM EST -----  Subject: Appointment Request    Reason for Call: Established Patient Appointment needed: Routine Existing   Condition Follow Up    QUESTIONS    Reason for appointment request? No appointments available during search     Additional Information for Provider? Patient is wanting to reschedule his   appointment that he had for today for a follow up to go over labs and   medications. He would like a morning appointment after 10am on any day. Please call him back to schedule.  Thank you.   ---------------------------------------------------------------------------  --------------  Stevie KIRKPATRICK  [phone]; OK to leave message on voicemail  ---------------------------------------------------------------------------  --------------  SCRIPT ANSWERS

## 2023-11-21 DIAGNOSIS — E11.8 TYPE 2 DIABETES MELLITUS WITH COMPLICATION, WITH LONG-TERM CURRENT USE OF INSULIN (HCC): ICD-10-CM

## 2023-11-21 DIAGNOSIS — G62.9 NEUROPATHY: ICD-10-CM

## 2023-11-21 DIAGNOSIS — Z79.4 TYPE 2 DIABETES MELLITUS WITH COMPLICATION, WITH LONG-TERM CURRENT USE OF INSULIN (HCC): ICD-10-CM

## 2023-11-21 NOTE — TELEPHONE ENCOUNTER
E-scribe request for GABAPENTIN. Please review and e-scribe if applicable. Last Visit Date:  8/30/2023  Next Visit Date:  11/29/2023    Hemoglobin A1C (%)   Date Value   09/15/2022 12.1   05/20/2022 11.4   01/29/2019 11.6 (H)             ( goal A1C is < 7)   No components found for: \"LABMICR\"  LDL Cholesterol (mg/dL)   Date Value   02/12/2021 46   02/12/2021 46       (goal LDL is <100)   AST (U/L)   Date Value   02/12/2021 26   02/12/2021 26     ALT (U/L)   Date Value   02/12/2021 27   02/12/2021 27     BUN (mg/dL)   Date Value   11/12/2021 13     BP Readings from Last 3 Encounters:   08/30/23 116/71   06/02/23 134/83   05/01/23 (!) 148/73          (goal 120/80)        Patient Active Problem List:     Type 2 diabetes mellitus with complication, with long-term current use of insulin (HCC)     Essential hypertension     Chest pain, atypical     Need for prophylactic vaccination and inoculation against varicella     Allergic rhinitis     Pain in right toe(s)     Foot infection     Diabetic ulcer of toe of right foot associated with type 2 diabetes mellitus, with fat layer exposed ([address])     Ulcer of foot due to secondary diabetes ([address])     Ulcer of right foot, limited to breakdown of skin (HCC)     Chest pain on breathing     Neuropathy     Hyperlipidemia     Diabetes mellitus (HCC)     Severe obesity (BMI 35.0-39. 9) with comorbidity ([address])      ----JF

## 2023-11-24 RX ORDER — GABAPENTIN 600 MG/1
600 TABLET ORAL 3 TIMES DAILY
Qty: 18 TABLET | Refills: 0 | Status: SHIPPED | OUTPATIENT
Start: 2023-11-24 | End: 2023-11-30

## 2023-11-24 RX ORDER — ASPIRIN 81 MG/1
TABLET ORAL
Qty: 60 TABLET | Refills: 3 | Status: SHIPPED | OUTPATIENT
Start: 2023-11-24

## 2023-11-24 NOTE — TELEPHONE ENCOUNTER
PC to pt to let know of medication refill of 6 day supply to [facility] in order to get him to appt on 11/29/23, pt expressed gratitude, writer then reminded of lab work to be done and requested we get it before his appt. Pt stated he would stop in Monday or Tuesday to the lab so we have those results in time.

## 2023-11-24 NOTE — TELEPHONE ENCOUNTER
Chart reviewed again per medication refill request.  Patient has been requested for sooner follow up on November 27th for further evaluation of Gabapentin. BMP and Microalbumin also still pending and incomplete per current review of records as well as cologuard. Writer requesting these to be completed as soon as possible or prior to visit in setting of Gabapentin request (see previous medication refill requests). PDMP reviewed showing last Gabapentin refill on 10/10/2023. Will provide patient with Gabapentin until visit on the 29th or 6 days worth of Gabapentin due to monitored nature of medication and risks therein. MA team to notify patient that medication refilled for 6 days and that labs are to be completed ASAP. MA team to let writer know if any questions or concerns.

## 2023-11-28 NOTE — PROGRESS NOTES
Medicare Annual Wellness Visit    Carmen Pina is here for [facility] AWV    Assessment & Plan   Initial Medicare annual wellness visit  Comments:  Resources given for patient, pt to fol/up w/ Dentistry, will review memory issues in future, pt given Adv. Directive information, fall prev discussed  Type 2 diabetes mellitus with complication, with long-term current use of insulin ([address])  Comments:  Uncontrolled, will co-manage w/ Endo, will fol/up w/ pt after Endo visit in January and d/further, pt aware if sugars > 400 persistently/severe sx's to go to ER  Orders:  -     [address]  -     gabapentin (NEURONTIN) 600 MG tablet; Take 1 tablet by mouth 3 times daily for 30 days. , Disp-90 tablet, R-0Normal  -     [facility] DIABETES FOOT EXAM  Other diabetic neurological complication associated with type 2 diabetes mellitus ([address])  Comments: Will fill Gabapentin at this time for diabetic neuropathy, refer to Podiatry for further eval and if pt needs GENNY or further testing, last vascular study WNL  Orders:  -     gabapentin (NEURONTIN) 600 MG tablet; Take 1 tablet by mouth 3 times daily for 30 days. , Disp-90 tablet, R-0Normal  -     HM DIABETES FOOT EXAM  Diabetic polyneuropathy associated with type 2 diabetes mellitus ([address])  -     [facility] [address]  -      DIABETES FOOT EXAM  Neuropathy  Comments:  ELY, pt attributes to Neck issue, pt to complete previousely ordered EMG, stable   Orders:  -     gabapentin (NEURONTIN) 600 MG tablet; Take 1 tablet by mouth 3 times daily for 30 days. , Disp-90 tablet, R-0Normal  -     [facility] DIABETES FOOT EXAM  Chest pain, atypical  Comments:  No active chest pain, stable, will refer to Cardiology for further risk stratification, if needed, and further evaluation in setting of recurrent Nitro use  Orders:  -     AFL (Epic) - Kapil Levine DO, Cardiology, [place]  Class 2 severe obesity due to excess calories with serious comorbidity and body mass

## 2023-11-29 ENCOUNTER — OFFICE VISIT (OUTPATIENT)
Dept: FAMILY MEDICINE CLINIC | Age: 53
End: 2023-11-29
Payer: MEDICAID

## 2023-11-29 VITALS
HEIGHT: 72 IN | WEIGHT: 270.6 LBS | BODY MASS INDEX: 36.65 KG/M2 | DIASTOLIC BLOOD PRESSURE: 89 MMHG | HEART RATE: 82 BPM | SYSTOLIC BLOOD PRESSURE: 144 MMHG

## 2023-11-29 DIAGNOSIS — Z91.89 PNEUMOCOCCAL VACCINATION INDICATED: ICD-10-CM

## 2023-11-29 DIAGNOSIS — L85.9 HYPERKERATOSIS: ICD-10-CM

## 2023-11-29 DIAGNOSIS — R07.89 CHEST PAIN, ATYPICAL: ICD-10-CM

## 2023-11-29 DIAGNOSIS — Z23 ENCOUNTER FOR IMMUNIZATION: ICD-10-CM

## 2023-11-29 DIAGNOSIS — E11.42 DIABETIC POLYNEUROPATHY ASSOCIATED WITH TYPE 2 DIABETES MELLITUS (HCC): ICD-10-CM

## 2023-11-29 DIAGNOSIS — Z00.00 INITIAL MEDICARE ANNUAL WELLNESS VISIT: Primary | ICD-10-CM

## 2023-11-29 DIAGNOSIS — E11.49 OTHER DIABETIC NEUROLOGICAL COMPLICATION ASSOCIATED WITH TYPE 2 DIABETES MELLITUS (HCC): ICD-10-CM

## 2023-11-29 DIAGNOSIS — Z23 NEEDS FLU SHOT: ICD-10-CM

## 2023-11-29 DIAGNOSIS — G62.9 NEUROPATHY: ICD-10-CM

## 2023-11-29 DIAGNOSIS — Z71.82 EXERCISE COUNSELING: ICD-10-CM

## 2023-11-29 DIAGNOSIS — E11.69 TYPE 2 DIABETES MELLITUS WITH OTHER SPECIFIED COMPLICATION, WITH LONG-TERM CURRENT USE OF INSULIN (HCC): Primary | ICD-10-CM

## 2023-11-29 DIAGNOSIS — Z79.4 TYPE 2 DIABETES MELLITUS WITH OTHER SPECIFIED COMPLICATION, WITH LONG-TERM CURRENT USE OF INSULIN (HCC): Primary | ICD-10-CM

## 2023-11-29 DIAGNOSIS — E11.8 TYPE 2 DIABETES MELLITUS WITH COMPLICATION, WITH LONG-TERM CURRENT USE OF INSULIN (HCC): ICD-10-CM

## 2023-11-29 DIAGNOSIS — Z87.891 FORMER SMOKER, STOPPED SMOKING IN DISTANT PAST: ICD-10-CM

## 2023-11-29 DIAGNOSIS — R20.8 DECREASED SENSATION OF FOOT: ICD-10-CM

## 2023-11-29 DIAGNOSIS — Z71.3 NUTRITIONAL COUNSELING: ICD-10-CM

## 2023-11-29 DIAGNOSIS — Z79.4 TYPE 2 DIABETES MELLITUS WITH COMPLICATION, WITH LONG-TERM CURRENT USE OF INSULIN (HCC): ICD-10-CM

## 2023-11-29 DIAGNOSIS — E66.01 CLASS 2 SEVERE OBESITY DUE TO EXCESS CALORIES WITH SERIOUS COMORBIDITY AND BODY MASS INDEX (BMI) OF 36.0 TO 36.9 IN ADULT (HCC): ICD-10-CM

## 2023-11-29 PROCEDURE — 90686 IIV4 VACC NO PRSV 0.5 ML IM: CPT | Performed by: FAMILY MEDICINE

## 2023-11-29 PROCEDURE — 90677 PCV20 VACCINE IM: CPT | Performed by: FAMILY MEDICINE

## 2023-11-29 RX ORDER — GABAPENTIN 600 MG/1
600 TABLET ORAL 3 TIMES DAILY
Qty: 90 TABLET | Refills: 0 | Status: SHIPPED | OUTPATIENT
Start: 2023-11-29 | End: 2023-12-29

## 2023-11-29 ASSESSMENT — PATIENT HEALTH QUESTIONNAIRE - PHQ9
SUM OF ALL RESPONSES TO PHQ QUESTIONS 1-9: 0
2. FEELING DOWN, DEPRESSED OR HOPELESS: 0
SUM OF ALL RESPONSES TO PHQ QUESTIONS 1-9: 0
SUM OF ALL RESPONSES TO PHQ QUESTIONS 1-9: 0
1. LITTLE INTEREST OR PLEASURE IN DOING THINGS: 0
1. LITTLE INTEREST OR PLEASURE IN DOING THINGS: 0
SUM OF ALL RESPONSES TO PHQ QUESTIONS 1-9: 0
SUM OF ALL RESPONSES TO PHQ QUESTIONS 1-9: 0
2. FEELING DOWN, DEPRESSED OR HOPELESS: 0
SUM OF ALL RESPONSES TO PHQ9 QUESTIONS 1 & 2: 0
SUM OF ALL RESPONSES TO PHQ QUESTIONS 1-9: 0
SUM OF ALL RESPONSES TO PHQ9 QUESTIONS 1 & 2: 0

## 2023-11-29 ASSESSMENT — LIFESTYLE VARIABLES
HOW MANY STANDARD DRINKS CONTAINING ALCOHOL DO YOU HAVE ON A TYPICAL DAY: PATIENT DOES NOT DRINK
HOW OFTEN DO YOU HAVE A DRINK CONTAINING ALCOHOL: NEVER

## 2023-11-29 NOTE — PROGRESS NOTES
Visit Information    Have you changed or started any medications since your last visit including any over-the-counter medicines, vitamins, or herbal medicines? no   Are you having any side effects from any of your medications? -  no  Have you stopped taking any of your medications? Is so, why? -  no    Have you seen any other physician or provider since your last visit? No  Have you had any other diagnostic tests since your last visit? No  Have you been seen in the emergency room and/or had an admission to a hospital since we last saw you? No  Have you had your routine dental cleaning in the past 6 months? yes -     Have you activated your [employer] account? If not, what are your barriers?  Yes      Patient Care Team:  Duane Parr, MD as PCP - General (Family Medicine)  Duane Parr, MD [facility] P[facility]    Medical History Review  Past Medical, Family, and Social History reviewed and does not contribute to the patient presenting condition    Health Maintenance   Topic Date Due    COVID-19 Vaccine (1) Never done    Diabetic retinal exam  Never done    Colorectal Cancer Screen  Never done    Hepatitis B vaccine (2 of 3 - 19+ 3-dose series) 05/10/2019    Shingles vaccine (2 of 2) 02/01/2020    Pneumococcal 0-64 years Vaccine (2 - PCV) 04/12/2020    Diabetic Alb to Cr ratio (uACR) test  02/12/2022    Lipids  02/12/2022    Diabetic foot exam  11/10/2022    GFR test (Diabetes, CKD 3-4, OR last GFR 15-59)  11/12/2022    Flu vaccine (1) 08/01/2023    Depression Screen  03/15/2024    A1C test (Diabetic or Prediabetic)  08/31/2024    Annual Wellness Visit (AWV)  11/29/2024    DTaP/Tdap/Td vaccine (2 - Td or Tdap) 08/10/2028    Hepatitis C screen  Completed    HIV screen  Completed    Hepatitis A vaccine  Aged Out    Hib vaccine  Aged Out    Meningococcal (ACWY) vaccine  Aged Out

## 2023-12-01 DIAGNOSIS — Z79.4 TYPE 2 DIABETES MELLITUS WITH COMPLICATION, WITH LONG-TERM CURRENT USE OF INSULIN (HCC): ICD-10-CM

## 2023-12-01 DIAGNOSIS — E11.8 TYPE 2 DIABETES MELLITUS WITH COMPLICATION, WITH LONG-TERM CURRENT USE OF INSULIN (HCC): ICD-10-CM

## 2023-12-01 DIAGNOSIS — I10 ESSENTIAL HYPERTENSION: ICD-10-CM

## 2023-12-01 PROBLEM — L97.509 ULCER OF FOOT DUE TO SECONDARY DIABETES (HCC): Status: RESOLVED | Noted: 2021-11-11 | Resolved: 2023-12-01

## 2023-12-01 PROBLEM — E66.812 CLASS 2 SEVERE OBESITY DUE TO EXCESS CALORIES WITH SERIOUS COMORBIDITY AND BODY MASS INDEX (BMI) OF 36.0 TO 36.9 IN ADULT: Status: ACTIVE | Noted: 2023-03-15

## 2023-12-01 PROBLEM — E11.42 DIABETIC POLYNEUROPATHY ASSOCIATED WITH TYPE 2 DIABETES MELLITUS (HCC): Status: ACTIVE | Noted: 2022-05-20

## 2023-12-01 PROBLEM — E13.621 ULCER OF FOOT DUE TO SECONDARY DIABETES (HCC): Status: RESOLVED | Noted: 2021-11-11 | Resolved: 2023-12-01

## 2023-12-01 PROBLEM — Z87.891 FORMER SMOKER, STOPPED SMOKING IN DISTANT PAST: Status: ACTIVE | Noted: 2023-12-01

## 2023-12-02 DIAGNOSIS — E11.8 TYPE 2 DIABETES MELLITUS WITH COMPLICATION, WITH LONG-TERM CURRENT USE OF INSULIN (HCC): ICD-10-CM

## 2023-12-02 DIAGNOSIS — I10 ESSENTIAL HYPERTENSION: ICD-10-CM

## 2023-12-02 DIAGNOSIS — Z79.4 TYPE 2 DIABETES MELLITUS WITH COMPLICATION, WITH LONG-TERM CURRENT USE OF INSULIN (HCC): ICD-10-CM

## 2023-12-04 DIAGNOSIS — I10 ESSENTIAL HYPERTENSION: ICD-10-CM

## 2023-12-04 NOTE — TELEPHONE ENCOUNTER
E-scribe request for METOPROLOL. Please review and e-scribe if applicable.      Last Visit Date:  11/29/2023  Next Visit Date:  Visit date not found    Hemoglobin A1C (%)   Date Value   09/15/2022 12.1   05/20/2022 11.4   01/29/2019 11.6 (H)             ( goal A1C is < 7)   No components found for: \"LABMICR\"  LDL Cholesterol (mg/dL)   Date Value   02/12/2021 46   02/12/2021 46       (goal LDL is <100)   AST (U/L)   Date Value   02/12/2021 26   02/12/2021 26     ALT (U/L)   Date Value   02/12/2021 27   02/12/2021 27     BUN (mg/dL)   Date Value   11/12/2021 13     BP Readings from Last 3 Encounters:   11/29/23 (!) 144/89   08/30/23 116/71   06/02/23 134/83          (goal 120/80)        Patient Active Problem List:     Type 2 diabetes mellitus with complication, with long-term current use of insulin (HCC)     Essential hypertension     Chest pain, atypical     Need for prophylactic vaccination and inoculation against varicella     Allergic rhinitis     Pain in right toe(s)     Foot infection     Ulcer of right foot, limited to breakdown of skin ([address])     Chest pain on breathing     Diabetic polyneuropathy associated with type 2 diabetes mellitus ([address])     Hyperlipidemia     Diabetes mellitus ([address])     Class 2 severe obesity due to excess calories with serious comorbidity and body mass index (BMI) of 36.0 to 36.9 in adult [facility])     Former smoker, stopped smoking in distant past      ----JF

## 2023-12-04 NOTE — TELEPHONE ENCOUNTER
E-scribe request for METOPROLOL. Please review and e-scribe if applicable.     Last Visit Date:  11/29/2023  Next Visit Date:  12/2/2023    Hemoglobin A1C (%)   Date Value   09/15/2022 12.1   05/20/2022 11.4   01/29/2019 11.6 (H)             ( goal A1C is < 7)   No components found for: \"LABMICR\"  LDL Cholesterol (mg/dL)   Date Value   02/12/2021 46   02/12/2021 46       (goal LDL is <100)   AST (U/L)   Date Value   02/12/2021 26   02/12/2021 26     ALT (U/L)   Date Value   02/12/2021 27   02/12/2021 27     BUN (mg/dL)   Date Value   11/12/2021 13     BP Readings from Last 3 Encounters:   11/29/23 (!) 144/89   08/30/23 116/71   06/02/23 134/83          (goal 120/80)        Patient Active Problem List:     Type 2 diabetes mellitus with complication, with long-term current use of insulin (HCC)     Essential hypertension     Chest pain, atypical     Need for prophylactic vaccination and inoculation against varicella     Allergic rhinitis     Pain in right toe(s)     Foot infection     Ulcer of right foot, limited to breakdown of skin (HCC)     Chest pain on breathing     Diabetic polyneuropathy associated with type 2 diabetes mellitus (HCC)     Hyperlipidemia     Diabetes mellitus (HCC)     Class 2 severe obesity due to excess calories with serious comorbidity and body mass index (BMI) of 36.0 to 36.9 in adult (HCC)     Former smoker, stopped smoking in distant past      ----JF

## 2023-12-04 NOTE — TELEPHONE ENCOUNTER
E-scribe request for LISINOPRIL. Please review and e-scribe if applicable.      Last Visit Date:  11/29/2023  Next Visit Date:  Visit date not found    Hemoglobin A1C (%)   Date Value   09/15/2022 12.1   05/20/2022 11.4   01/29/2019 11.6 (H)             ( goal A1C is < 7)   No components found for: \"LABMICR\"  LDL Cholesterol (mg/dL)   Date Value   02/12/2021 46   02/12/2021 46       (goal LDL is <100)   AST (U/L)   Date Value   02/12/2021 26   02/12/2021 26     ALT (U/L)   Date Value   02/12/2021 27   02/12/2021 27     BUN (mg/dL)   Date Value   11/12/2021 13     BP Readings from Last 3 Encounters:   11/29/23 (!) 144/89   08/30/23 116/71   06/02/23 134/83          (goal 120/80)        Patient Active Problem List:     Type 2 diabetes mellitus with complication, with long-term current use of insulin (HCC)     Essential hypertension     Chest pain, atypical     Need for prophylactic vaccination and inoculation against varicella     Allergic rhinitis     Pain in right toe(s)     Foot infection     Ulcer of right foot, limited to breakdown of skin ([address])     Chest pain on breathing     Diabetic polyneuropathy associated with type 2 diabetes mellitus ([address])     Hyperlipidemia     Diabetes mellitus ([address])     Class 2 severe obesity due to excess calories with serious comorbidity and body mass index (BMI) of 36.0 to 36.9 in adult [facility])     Former smoker, stopped smoking in distant past      ----JF no chest pain, , and no shortness of breath.

## 2023-12-08 ENCOUNTER — TELEPHONE (OUTPATIENT)
Dept: FAMILY MEDICINE CLINIC | Age: 53
End: 2023-12-08

## 2023-12-11 RX ORDER — METOPROLOL SUCCINATE 50 MG/1
50 TABLET, EXTENDED RELEASE ORAL DAILY
Qty: 30 TABLET | Refills: 2 | Status: SHIPPED | OUTPATIENT
Start: 2023-12-11

## 2023-12-11 RX ORDER — LISINOPRIL 40 MG/1
40 TABLET ORAL DAILY
Qty: 15 TABLET | Refills: 0 | Status: SHIPPED | OUTPATIENT
Start: 2023-12-11

## 2023-12-11 RX ORDER — METOPROLOL SUCCINATE 50 MG/1
50 TABLET, EXTENDED RELEASE ORAL DAILY
Qty: 90 TABLET | OUTPATIENT
Start: 2023-12-11

## 2023-12-11 NOTE — TELEPHONE ENCOUNTER
Writer will provide patient with 15 days worth of medication at this time for patient safety pending completion of labs requested since July 2023 and again discussed on last visit in November 2023. Patient made aware at that time failure to complete labs will result in shorter duration of medication until labs completed. Patient also to follow up in clinic for BP recheck as well. See telephone note from 12/8/2023. MA team to notify patient of the above, completing requested labs especially BMP and assist in scheduling blood pressure check with department. Please let writer know if any questions.

## 2023-12-11 NOTE — TELEPHONE ENCOUNTER
When patient calls back to schedule follow up, Please add his [address]- Diabetic ulcer of toe of right foot associated with type 2 diabetes mellitus, with fat layer exposed to the follow up too please.

## 2023-12-13 ENCOUNTER — TELEPHONE (OUTPATIENT)
Dept: FAMILY MEDICINE CLINIC | Age: 53
End: 2023-12-13

## 2023-12-13 ENCOUNTER — OFFICE VISIT (OUTPATIENT)
Dept: PODIATRY | Age: 53
End: 2023-12-13
Payer: MEDICAID

## 2023-12-13 VITALS
DIASTOLIC BLOOD PRESSURE: 94 MMHG | SYSTOLIC BLOOD PRESSURE: 184 MMHG | WEIGHT: 270 LBS | HEART RATE: 84 BPM | BODY MASS INDEX: 36.61 KG/M2

## 2023-12-13 DIAGNOSIS — B35.3 TINEA PEDIS OF BOTH FEET: ICD-10-CM

## 2023-12-13 DIAGNOSIS — M20.41 HAMMER TOES OF BOTH FEET: ICD-10-CM

## 2023-12-13 DIAGNOSIS — M79.671 PAIN IN BOTH FEET: ICD-10-CM

## 2023-12-13 DIAGNOSIS — M79.672 PAIN IN BOTH FEET: ICD-10-CM

## 2023-12-13 DIAGNOSIS — E11.51 TYPE II DIABETES MELLITUS WITH PERIPHERAL CIRCULATORY DISORDER (HCC): ICD-10-CM

## 2023-12-13 DIAGNOSIS — I73.9 PERIPHERAL VASCULAR DISORDER (HCC): Primary | ICD-10-CM

## 2023-12-13 DIAGNOSIS — M20.42 HAMMER TOES OF BOTH FEET: ICD-10-CM

## 2023-12-13 DIAGNOSIS — I73.9 PVD (PERIPHERAL VASCULAR DISEASE) (HCC): ICD-10-CM

## 2023-12-13 PROCEDURE — 3080F DIAST BP >= 90 MM HG: CPT

## 2023-12-13 PROCEDURE — 2022F DILAT RTA XM EVC RTNOPTHY: CPT

## 2023-12-13 PROCEDURE — 3077F SYST BP >= 140 MM HG: CPT

## 2023-12-13 PROCEDURE — 99203 OFFICE O/P NEW LOW 30 MIN: CPT

## 2023-12-13 PROCEDURE — 3046F HEMOGLOBIN A1C LEVEL >9.0%: CPT

## 2023-12-13 PROCEDURE — 3017F COLORECTAL CA SCREEN DOC REV: CPT

## 2023-12-13 PROCEDURE — G8482 FLU IMMUNIZE ORDER/ADMIN: HCPCS

## 2023-12-13 PROCEDURE — 1036F TOBACCO NON-USER: CPT

## 2023-12-13 PROCEDURE — G8427 DOCREV CUR MEDS BY ELIG CLIN: HCPCS

## 2023-12-13 PROCEDURE — G8417 CALC BMI ABV UP PARAM F/U: HCPCS

## 2023-12-13 PROCEDURE — 99203 OFFICE O/P NEW LOW 30 MIN: CPT | Performed by: PODIATRIST

## 2023-12-13 RX ORDER — INSULIN PUMP CONTROLLER
EACH MISCELLANEOUS
COMMUNITY
Start: 2023-11-28

## 2023-12-13 RX ORDER — KETOCONAZOLE 20 MG/G
CREAM TOPICAL
Qty: 30 G | Refills: 0 | Status: SHIPPED | OUTPATIENT
Start: 2023-12-13

## 2023-12-13 NOTE — PROGRESS NOTES
Patient instructed to remove shoes and socks and instructed to sit in exam chair.  Current PCP is Sandoval Ortiz MD and date of last visit was 08/30/2023.   Do you have a follow up visit scheduled?  No  If yes, the date is      Diabetic visit information    Blood pressure (Control is BP <140/90)  BP Readings from Last 3 Encounters:   11/29/23 (!) 144/89   08/30/23 116/71   06/02/23 134/83       BP taken with correct size cuff? - Yes   Repeated if > 140/90 Yes      Tobacco use:  Patient  reports that he quit smoking about 28 years ago. His smoking use included cigars. He has never used smokeless tobacco.  If Smoker - Cessation materials given?- Yes       Diabetic Health Maintenance Items due  Diabetes Management   Topic Date Due    Diabetic retinal exam  Never done    Diabetic Alb to Cr ratio (uACR) test  02/12/2022    Lipids  02/12/2022    Diabetic foot exam  11/10/2022       Diabetic retinal exam done in last year? - Yes   If No: remind patient that it is due and they should schedule an exam    Medications  Is patient taking any medications for diabetes? -   Yes  Have blood sugars been controlled?   Fasting blood sugars under 120   -   Yes   Random home sugars or today's POCT glucose is under 180 -   Yes   []  If No to the above then patient should schedule appt with PCP.     Diabetic Plan    A1C Plan  Lab Results   Component Value Date    LABA1C 12.1 09/15/2022    LABA1C 11.4 05/20/2022    LABA1C 11.6 (H) 01/29/2019      []  If A1C over 8 and last result >3 months ago - Order A1C and refer to PCP   []  If last A1C over 6 months ago - Order A1C and refer to PCP for follow up   []  If elevated blood sugars > 180 - refer to PCP for follow up    []  Blood sugar controlled - A1C under 8 and last check was < 6 months      Cholesterol Plan   Lab Results   Component Value Date    LDLCHOLESTEROL 46 02/12/2021    LDLCHOLESTEROL 46 02/12/2021      []  If LDL > 100 and last result >3 months ago - order Fasting lipids and 
GRISELDA Roa   Podiatric Medicine & Surgery   12/13/2023 at 1:02 PM      I performed a history and physical examination of the patient and discussed management with the resident. I reviewed the resident’s note and agree with the documented findings and plan of care. Any areas of disagreement are noted on the chart. I was personally present for the key portions of any procedures. I have documented in the chart those procedures where I was not present during the key portions. I have reviewed the Podiatry Resident progress note. I agree with the chief complaint, past medical history, past surgical history, allergies, medications, social and family history as documented unless otherwise noted below. Documentation of the HPI, Physical Exam and Medical Decision Making performed by medical students or scribes is based on my personal performance of the HPI, PE and MDM. I have personally evaluated this patient and have completed at least one if not all key elements of the E/M (history, physical exam, and MDM). Additional findings are as noted.     Electronically signed by eBn Arroyo DPM on 12/20/2023 at 12:49 PM

## 2023-12-14 NOTE — TELEPHONE ENCOUNTER
PC to pt to notify and schedule appt, call rang through to VM, writer TAVON to contact office back at [phone] for message from provider.

## 2024-01-01 DIAGNOSIS — E78.2 MIXED HYPERLIPIDEMIA: ICD-10-CM

## 2024-01-02 RX ORDER — ATORVASTATIN CALCIUM 40 MG/1
TABLET, FILM COATED ORAL
Qty: 90 TABLET | Refills: 0 | Status: SHIPPED | OUTPATIENT
Start: 2024-01-02

## 2024-01-02 NOTE — TELEPHONE ENCOUNTER
E-scribe request for LIPITOR. Please review and e-scribe if applicable.     Last Visit Date:  11/29/2023  Next Visit Date:  1/31/2024    Hemoglobin A1C (%)   Date Value   09/15/2022 12.1   05/20/2022 11.4   01/29/2019 11.6 (H)             ( goal A1C is < 7)   No components found for: \"LABMICR\"  LDL Cholesterol (mg/dL)   Date Value   02/12/2021 46   02/12/2021 46       (goal LDL is <100)   AST (U/L)   Date Value   02/12/2021 26   02/12/2021 26     ALT (U/L)   Date Value   02/12/2021 27   02/12/2021 27     BUN (mg/dL)   Date Value   11/12/2021 13     BP Readings from Last 3 Encounters:   12/13/23 (!) 184/94   11/29/23 (!) 144/89   08/30/23 116/71          (goal 120/80)        Patient Active Problem List:     Type 2 diabetes mellitus with complication, with long-term current use of insulin (HCC)     Essential hypertension     Chest pain, atypical     Need for prophylactic vaccination and inoculation against varicella     Allergic rhinitis     Pain in right toe(s)     Foot infection     Ulcer of right foot, limited to breakdown of skin (HCC)     Chest pain on breathing     Diabetic polyneuropathy associated with type 2 diabetes mellitus (HCC)     Hyperlipidemia     Diabetes mellitus (HCC)     Class 2 severe obesity due to excess calories with serious comorbidity and body mass index (BMI) of 36.0 to 36.9 in adult (HCC)     Former smoker, stopped smoking in distant past      ----JF

## 2024-01-02 NOTE — TELEPHONE ENCOUNTER
Patient medical chart review due to medication request for atorvastatin.  Of note patient does have a outstanding lipid panel ordered in 3/15/2023 that but was to completed completed.  Patient aware of the need for this test, as writer has discussed this with patient multiple times, and patient is also due for microalbumin test as well as basic metabolic panel.  Patient has scheduled follow-up with writer at the end of the month and patient is to complete blood work prior to then.  Will fill patient medication for 90 days without any refills at this time pending lab work as patient dose may need to be adjusted accordingly.    MA team to contact patient regarding the above, assist patient with completing labs requested, and let writer know if any questions or concerns.  Once patient is able to complete requested lab work writer will be able to provide patient with longer duration of medication pending again results.

## 2024-01-04 NOTE — TELEPHONE ENCOUNTER
PC to pt once more to relay message regarding lab work still needing completion, call rang through to VM once more, pt left message stating to contact office back at [phone] for message from their provider.

## 2024-01-08 DIAGNOSIS — I10 ESSENTIAL HYPERTENSION: ICD-10-CM

## 2024-01-08 RX ORDER — LISINOPRIL 40 MG/1
40 TABLET ORAL DAILY
Qty: 15 TABLET | Refills: 0 | Status: SHIPPED | OUTPATIENT
Start: 2024-01-08

## 2024-01-08 NOTE — TELEPHONE ENCOUNTER
Patient medical chart review due to medication request for atorvastatin.  Of note patient does have a outstanding lipid panel ordered in 3/15/2023 that but was to completed completed.  Patient aware of the need for this test, as writer has discussed this with patient multiple times, and patient is also due for microalbumin test as well as basic metabolic panel.  Patient has scheduled follow-up with writer at the end of the month and patient is to complete blood work prior to then.  Will fill patient medication for 15 days without any refills at this time pending lab work as patient dose may need to be adjusted accordingly.  Once lab work completed, will supply patient with 90 day supply to ensure patient safety prior to prescribing medication or if any dosage adjustment needed.       MA team to contact patient regarding the above, assist patient with completing labs requested, and let writer know if any questions or concerns.  Once patient is able to complete requested lab work writer will be able to provide patient with longer duration of medication pending again results.

## 2024-01-08 NOTE — TELEPHONE ENCOUNTER
----- Message from Rose Riley sent at 1/8/2024 10:46 AM EST -----  Subject: Refill Request    QUESTIONS  Name of Medication? lisinopril (PRINIVIL;ZESTRIL) 40 MG tablet  Patient-reported dosage and instructions? once a day   How many days do you have left? 1  Preferred Pharmacy? [id number]  Pharmacy phone number (if available)? [phone]  Additional Information for Provider? Patient tried to get through pharmacy   but they told him to call his PCP to get a refill. Does patient need an   appt?   ---------------------------------------------------------------------------  --------------  CALL BACK INFO  What is the best way for the office to contact you? OK to leave message on   voicemail  Preferred Call Back Phone Number? [phone]  ---------------------------------------------------------------------------  --------------  SCRIPT ANSWERS  Relationship to Patient? Self

## 2024-01-08 NOTE — TELEPHONE ENCOUNTER
Writer attempted to call patient and schedule but no answer. Writer left a detailed message on voicemail about upcoming appts and labs that needs to be done as well as the outstanding blood work to be completed before 1/31/824 appt.

## 2024-01-29 LAB
ESTIMATED AVERAGE GLUCOSE: NORMAL
HBA1C MFR BLD: 10.3 %

## 2024-01-31 ENCOUNTER — OFFICE VISIT (OUTPATIENT)
Dept: FAMILY MEDICINE CLINIC | Age: 54
End: 2024-01-31
Payer: MEDICARE

## 2024-01-31 VITALS
HEART RATE: 85 BPM | BODY MASS INDEX: 37.3 KG/M2 | DIASTOLIC BLOOD PRESSURE: 73 MMHG | HEIGHT: 72 IN | WEIGHT: 275.4 LBS | SYSTOLIC BLOOD PRESSURE: 135 MMHG

## 2024-01-31 DIAGNOSIS — I73.9 PERIPHERAL VASCULAR DISORDER (HCC): Chronic | ICD-10-CM

## 2024-01-31 DIAGNOSIS — E11.49 OTHER DIABETIC NEUROLOGICAL COMPLICATION ASSOCIATED WITH TYPE 2 DIABETES MELLITUS (HCC): Chronic | ICD-10-CM

## 2024-01-31 DIAGNOSIS — Z12.11 COLON CANCER SCREENING: ICD-10-CM

## 2024-01-31 DIAGNOSIS — E11.8 TYPE 2 DIABETES MELLITUS WITH COMPLICATION, WITH LONG-TERM CURRENT USE OF INSULIN (HCC): Chronic | ICD-10-CM

## 2024-01-31 DIAGNOSIS — E11.51 TYPE II DIABETES MELLITUS WITH PERIPHERAL CIRCULATORY DISORDER (HCC): ICD-10-CM

## 2024-01-31 DIAGNOSIS — Z87.891 FORMER SMOKER: ICD-10-CM

## 2024-01-31 DIAGNOSIS — R10.13 DYSPEPSIA: Chronic | ICD-10-CM

## 2024-01-31 DIAGNOSIS — E78.2 MIXED HYPERLIPIDEMIA: Chronic | ICD-10-CM

## 2024-01-31 DIAGNOSIS — G47.10 HYPERSOMNIA, UNSPECIFIED: ICD-10-CM

## 2024-01-31 DIAGNOSIS — Z79.4 TYPE 2 DIABETES MELLITUS WITH COMPLICATION, WITH LONG-TERM CURRENT USE OF INSULIN (HCC): Chronic | ICD-10-CM

## 2024-01-31 DIAGNOSIS — E66.01 SEVERE OBESITY (BMI 35.0-39.9) WITH COMORBIDITY (HCC): Chronic | ICD-10-CM

## 2024-01-31 DIAGNOSIS — I1A.0 RESISTANT HYPERTENSION: Primary | Chronic | ICD-10-CM

## 2024-01-31 DIAGNOSIS — G62.9 NEUROPATHY: Chronic | ICD-10-CM

## 2024-01-31 PROCEDURE — 3075F SYST BP GE 130 - 139MM HG: CPT | Performed by: FAMILY MEDICINE

## 2024-01-31 PROCEDURE — G8427 DOCREV CUR MEDS BY ELIG CLIN: HCPCS | Performed by: FAMILY MEDICINE

## 2024-01-31 PROCEDURE — 1036F TOBACCO NON-USER: CPT | Performed by: FAMILY MEDICINE

## 2024-01-31 PROCEDURE — G8482 FLU IMMUNIZE ORDER/ADMIN: HCPCS | Performed by: FAMILY MEDICINE

## 2024-01-31 PROCEDURE — 99214 OFFICE O/P EST MOD 30 MIN: CPT | Performed by: FAMILY MEDICINE

## 2024-01-31 PROCEDURE — 3017F COLORECTAL CA SCREEN DOC REV: CPT | Performed by: FAMILY MEDICINE

## 2024-01-31 PROCEDURE — G8417 CALC BMI ABV UP PARAM F/U: HCPCS | Performed by: FAMILY MEDICINE

## 2024-01-31 PROCEDURE — 3078F DIAST BP <80 MM HG: CPT | Performed by: FAMILY MEDICINE

## 2024-01-31 PROCEDURE — 2022F DILAT RTA XM EVC RTNOPTHY: CPT | Performed by: FAMILY MEDICINE

## 2024-01-31 PROCEDURE — 3046F HEMOGLOBIN A1C LEVEL >9.0%: CPT | Performed by: FAMILY MEDICINE

## 2024-01-31 RX ORDER — METFORMIN HYDROCHLORIDE 500 MG/1
500 TABLET, EXTENDED RELEASE ORAL
COMMUNITY
Start: 2024-01-29

## 2024-01-31 RX ORDER — HYDROCHLOROTHIAZIDE 25 MG/1
25 TABLET ORAL EVERY MORNING
Qty: 30 TABLET | Refills: 0 | Status: SHIPPED | OUTPATIENT
Start: 2024-01-31 | End: 2024-02-05 | Stop reason: SDUPTHER

## 2024-01-31 RX ORDER — METOPROLOL SUCCINATE 50 MG/1
50 TABLET, EXTENDED RELEASE ORAL DAILY
Qty: 30 TABLET | Refills: 2 | Status: SHIPPED | OUTPATIENT
Start: 2024-01-31

## 2024-01-31 RX ORDER — ATORVASTATIN CALCIUM 40 MG/1
40 TABLET, FILM COATED ORAL DAILY
Qty: 90 TABLET | Refills: 0 | Status: SHIPPED | OUTPATIENT
Start: 2024-01-31

## 2024-01-31 RX ORDER — FAMOTIDINE 40 MG/1
20 TABLET, FILM COATED ORAL 2 TIMES DAILY
Qty: 30 TABLET | Refills: 2 | Status: SHIPPED | OUTPATIENT
Start: 2024-01-31

## 2024-01-31 RX ORDER — GABAPENTIN 600 MG/1
600 TABLET ORAL 2 TIMES DAILY
Qty: 60 TABLET | Refills: 1 | Status: SHIPPED | OUTPATIENT
Start: 2024-01-31 | End: 2024-03-31

## 2024-01-31 RX ORDER — ASPIRIN 81 MG/1
TABLET ORAL
Qty: 60 TABLET | Refills: 3 | Status: SHIPPED | OUTPATIENT
Start: 2024-01-31

## 2024-01-31 ASSESSMENT — PATIENT HEALTH QUESTIONNAIRE - PHQ9
SUM OF ALL RESPONSES TO PHQ QUESTIONS 1-9: 2
1. LITTLE INTEREST OR PLEASURE IN DOING THINGS: 1
SUM OF ALL RESPONSES TO PHQ QUESTIONS 1-9: 2
SUM OF ALL RESPONSES TO PHQ9 QUESTIONS 1 & 2: 2
2. FEELING DOWN, DEPRESSED OR HOPELESS: 1
SUM OF ALL RESPONSES TO PHQ QUESTIONS 1-9: 2
SUM OF ALL RESPONSES TO PHQ QUESTIONS 1-9: 2

## 2024-01-31 NOTE — PATIENT INSTRUCTIONS
Thank you for letting us take care of you today. We hope all your questions were addressed. If a question was overlooked or something else comes to mind after you return home, please contact a member of your Care Team listed below.        Your Care Team at [facility] is Team #4  Sandoval Ortiz (Faculty)  Robb Winston (Resident)  Ashley Porter (Resident)  Uma Nguyen (Resident)  Rosy Smith (Resident)  Flor Patiño (Resident)  Radha Flores, EDUARD Us, EDUARD Holloway, EDUARD Walker, [facility]  Melinda Coker, [facility]  Rose Jacinto, [facility]  Roshni Joiner, [facility]  Ariana Wiley, EDUARD Delgado (LJ) CRISTIANE Valadez (Clinical Practice Manager)  Sravanthi Lopes RPH (Clinical Pharmacist)       Office phone number: [phone]    If you need to get in right away due to illness, please be advised we have \"Same Day\" appointments available Monday-Friday. Please call us at [phone] option #3 to schedule your \"Same Day\" appointment.

## 2024-01-31 NOTE — PROGRESS NOTES
HYPERTENSION visit     BP Readings from Last 3 Encounters:   12/13/23 (!) 184/94   11/29/23 (!) 144/89   08/30/23 116/71       LDL Cholesterol (mg/dL)   Date Value   02/12/2021 46   02/12/2021 46     HDL (mg/dL)   Date Value   02/12/2021 36 (L)   02/12/2021 36 (L)     BUN (mg/dL)   Date Value   11/12/2021 13     Creatinine (mg/dL)   Date Value   11/12/2021 0.96     Glucose (mg/dL)   Date Value   11/12/2021 341 (H)              Have you changed or started any medications since your last visit including any over-the-counter medicines, vitamins, or herbal medicines? no   Have you stopped taking any of your medications? Is so, why? -  no  Are you having any side effects from any of your medications? - no  How often do you miss doses of your medication? rare      Have you seen any other physician or provider since your last visit?  yes - PT, Endo   Have you had any other diagnostic tests since your last visit?  Yes - Labs  Have you been seen in the emergency room and/or had an admission in a hospital since we last saw you?  no   Have you had your routine dental cleaning in the past 6 months?  no     Do you have an active MyChart account? If no, what is the barrier?  Yes    Patient Care Team:  Sandoval Ortiz MD as PCP - General (Family Medicine)  Sandoval Ortiz MD as PCP - Empaneled Provider    Medical History Review  Past Medical, Family, and Social History reviewed and does contribute to the patient presenting condition    Health Maintenance   Topic Date Due    COVID-19 Vaccine (1) Never done    Diabetic retinal exam  Never done    Colorectal Cancer Screen  Never done    Hepatitis B vaccine (2 of 3 - 19+ 3-dose series) 05/10/2019    Shingles vaccine (2 of 2) 02/01/2020    Diabetic Alb to Cr ratio (uACR) test  02/12/2022    Lipids  02/12/2022    GFR test (Diabetes, CKD 3-4, OR last GFR 15-59)  11/12/2022    Annual Wellness Visit (Medicare Advantage)  01/01/2024    A1C test (Diabetic or Prediabetic)  09/06/2024 
Type 2 diabetes mellitus with complication, with long-term current use of insulin (HCC)  Comments:  Uncontrolled, will co-manage w/ Endo, will fol/up w/ pt after Endo visit in January and d/further, pt aware if sugars > 400 persistently/severe sx's to go to ER  Orders:  -     gabapentin (NEURONTIN) 600 MG tablet; Take 1 tablet by mouth 2 times daily for 60 days., Disp-60 tablet, R-1Normal  4. Other diabetic neurological complication associated with type 2 diabetes mellitus (HCC)  Comments:  Will fill Gabapentin, adjust for BID dosing for diabetic neuropathy, Podiatry ref pending, will repeat GENNY, will c/to work on risk reduction/lifestyle changes  Orders:  -     gabapentin (NEURONTIN) 600 MG tablet; Take 1 tablet by mouth 2 times daily for 60 days., Disp-60 tablet, R-1Normal  5. Type II diabetes mellitus with peripheral circulatory disorder (HCC)  Comments:  Restart Metf?, cand. for GLP-1 agent or SGLT2 agent pending cost barriers as opposed to DPP4, no hx of Pancreatitis/UTI/DKA/bone related disorders/Fhx of MEN2b  6. Neuropathy  Comments:  RUE, pt attributes to Neck issue, pt to complete previousely ordered EMG, stable   Orders:  -     gabapentin (NEURONTIN) 600 MG tablet; Take 1 tablet by mouth 2 times daily for 60 days., Disp-60 tablet, R-1Normal  7. Severe obesity (BMI 35.0-39.9) with comorbidity (HCC)  Comments:  Worsening, will strongly consider DM education and Starting Fresh program along with activity ref, will also consider GLP-1 agent and/or Nutrition ref/etc  8. Mixed hyperlipidemia  Comments:  Well controlled, LDL at goal of < 70, c/w Atorvastatin 40, repeat Lipid panel due fall/winter '24, will c/to work on lifestyle mgmt  Orders:  -     atorvastatin (LIPITOR) 40 MG tablet; Take 1 tablet by mouth daily, Disp-90 tablet, R-0Normal  9. Dyspepsia  Comments:  Chronic, stable, c/w Pepcid 20 BID dosing, briefly reviewed lifestyle changes and will c/to work on this  Orders:  -     famotidine (PEPCID) 40 MG

## 2024-02-05 DIAGNOSIS — I10 ESSENTIAL HYPERTENSION: ICD-10-CM

## 2024-02-05 RX ORDER — HYDROCHLOROTHIAZIDE 25 MG/1
25 TABLET ORAL EVERY MORNING
Qty: 90 TABLET | Refills: 1 | Status: SHIPPED | OUTPATIENT
Start: 2024-02-05

## 2024-02-06 DIAGNOSIS — I10 ESSENTIAL HYPERTENSION: ICD-10-CM

## 2024-02-06 PROBLEM — I1A.0 RESISTANT HYPERTENSION: Status: ACTIVE | Noted: 2024-02-06

## 2024-02-06 RX ORDER — LISINOPRIL 40 MG/1
40 TABLET ORAL DAILY
Qty: 90 TABLET | Refills: 1 | Status: SHIPPED | OUTPATIENT
Start: 2024-02-06 | End: 2024-08-04

## 2024-02-06 ASSESSMENT — ENCOUNTER SYMPTOMS
SHORTNESS OF BREATH: 0
BLURRED VISION: 0
RESPIRATORY NEGATIVE: 1
ORTHOPNEA: 0

## 2024-02-06 NOTE — TELEPHONE ENCOUNTER
Patient medical chart reviewed due to medication refill request. Of note, patient did receive a comprehensive metabolic panel at [facility] on 1/29/2024 in which renal function was within normal limits as well as his potassium. As such, writer will provide patient with 90-day supply of medication with 1 refill. Patient likely benefit from repeat renal function panel in 6 months to a year. MA team to notify patient of the above as well as labs outstanding labs ordered for patient including microalbumin, urinalysis, aldosterone, renin, and let writer know if any questions or concerns.   
Panel Once 06/02/2023   Hemoglobin A1C Once 11/29/2023   Vascular lower extremity arterial segmental pressures w PPG Once 12/13/2023   Renin Once 01/31/2024   Aldosterone Once 01/31/2024   Urinalysis with Microscopic Once 01/31/2024   US RENAL COMPLETE Once 02/01/2024   Microalbumin, Ur Once 02/01/2024               Patient Active Problem List:     Type 2 diabetes mellitus with complication, with long-term current use of insulin ([facility])     Essential hypertension     Chest pain, atypical     Need for prophylactic vaccination and inoculation against varicella     Allergic rhinitis     Pain in right toe(s)     Foot infection     Ulcer of right foot, limited to breakdown of skin (HCC)     Chest pain on breathing     Diabetic polyneuropathy associated with type 2 diabetes mellitus (HCC)     Hyperlipidemia     Diabetes mellitus ([facility])     Class 2 severe obesity due to excess calories with serious comorbidity and body mass index (BMI) of 36.0 to 36.9 in adult ([facility])     Former smoker, stopped smoking in distant past     Peripheral vascular disorder ([facility])

## 2024-02-07 NOTE — TELEPHONE ENCOUNTER
Patient medical chart reviewed due to medication refill request.  Of note, patient did receive a comprehensive metabolic panel at [facility] on 1/29/2024 that was normative with normal renal function and within normal limits potassium.  As such, writer will provide patient with 90-day supply of medication with 1 refill.  Patient likely benefit from repeat renal function panel in 6 months to a year.  MA team to notify patient of the above as well as labs outstanding labs ordered for patient including microalbumin, urinalysis, aldosterone, renin, and let writer know if any questions or concerns.  
Writer spoke to patient and informed of lab that are still pending. Writer mailed labs to patient as well. Patient stated will get done soon.   
Testing planned in [facility]  Lab Frequency Next Occurrence   Basic Metabolic Panel Once 03/15/2023   Lipid Panel Once 03/15/2023   Basic Metabolic Panel Once 06/02/2023   Hemoglobin A1C Once 11/29/2023   Vascular lower extremity arterial segmental pressures w PPG Once 12/13/2023   Renin Once 01/31/2024   Aldosterone Once 01/31/2024   Urinalysis with Microscopic Once 01/31/2024   US RENAL COMPLETE Once 02/01/2024   Microalbumin, Ur Once 02/01/2024               Patient Active Problem List:     Type 2 diabetes mellitus with complication, with long-term current use of insulin ([facility])     Essential hypertension     Chest pain, atypical     Need for prophylactic vaccination and inoculation against varicella     Allergic rhinitis     Pain in right toe(s)     Foot infection     Ulcer of right foot, limited to breakdown of skin (HCC)     Chest pain on breathing     Diabetic polyneuropathy associated with type 2 diabetes mellitus (HCC)     Hyperlipidemia     Diabetes mellitus ([facility])     Class 2 severe obesity due to excess calories with serious comorbidity and body mass index (BMI) of 36.0 to 36.9 in adult ([facility])     Former smoker, stopped smoking in distant past     Peripheral vascular disorder ([facility])

## 2024-02-16 RX ORDER — INSULIN LISPRO 100 [IU]/ML
INJECTION, SOLUTION INTRAVENOUS; SUBCUTANEOUS
Qty: 60 ML | Refills: 10 | Status: SHIPPED | OUTPATIENT
Start: 2024-02-16

## 2024-02-16 NOTE — TELEPHONE ENCOUNTER
Last visit: 1/31/24  Last Med refill: 8/30/23  Does patient have enough medication for 72 hours: no    Next Visit Date:  Future Appointments   Date Time Provider Department Center   3/5/2024 10:00 AM [facility] EMG  [facility] EM[facility]   3/5/2024 10:00 AM Kilo Kraus MD [facility]   3/13/2024 12:45 PM Ben Arroyo DPM [facility]   3/27/2024  4:00 PM [facility]       Health Maintenance   Topic Date Due    COVID-19 Vaccine (1) Never done    Diabetic retinal exam  Never done    Colorectal Cancer Screen  Never done    Hepatitis B vaccine (2 of 3 - 19+ 3-dose series) 05/10/2019    Shingles vaccine (2 of 2) 02/01/2020    Diabetic Alb to Cr ratio (uACR) test  02/12/2022    Lipids  02/12/2022    GFR test (Diabetes, CKD 3-4, OR last GFR 15-59)  11/12/2022    Annual Wellness Visit (Medicare Advantage)  01/01/2024    A1C test (Diabetic or Prediabetic)  04/29/2024    Diabetic foot exam  12/01/2024    Depression Screen  01/31/2025    DTaP/Tdap/Td vaccine (2 - Td or Tdap) 08/10/2028    Flu vaccine  Completed    Pneumococcal 0-64 years Vaccine  Completed    Hepatitis C screen  Completed    HIV screen  Completed    Hepatitis A vaccine  Aged Out    Hib vaccine  Aged Out    Polio vaccine  Aged Out    Meningococcal (ACWY) vaccine  Aged Out       Hemoglobin A1C (%)   Date Value   01/29/2024 10.3   09/15/2022 12.1   05/20/2022 11.4             ( goal A1C is < 7)   No components found for: \"LABMICR\"  LDL Cholesterol (mg/dL)   Date Value   02/12/2021 46   02/12/2021 46       (goal LDL is <100)   AST (U/L)   Date Value   02/12/2021 26   02/12/2021 26     ALT (U/L)   Date Value   02/12/2021 27   02/12/2021 27     BUN (mg/dL)   Date Value   11/12/2021 13     BP Readings from Last 3 Encounters:   01/31/24 135/73   12/13/23 (!) 184/94   11/29/23 (!) 144/89          (goal 120/80)    All Future Testing planned in [facility]  Lab Frequency Next Occurrence   Basic Metabolic Panel Once

## 2024-02-21 DIAGNOSIS — Z79.4 TYPE 2 DIABETES MELLITUS WITH COMPLICATION, WITH LONG-TERM CURRENT USE OF INSULIN (HCC): Chronic | ICD-10-CM

## 2024-02-21 DIAGNOSIS — G62.9 NEUROPATHY: Chronic | ICD-10-CM

## 2024-02-21 DIAGNOSIS — E11.8 TYPE 2 DIABETES MELLITUS WITH COMPLICATION, WITH LONG-TERM CURRENT USE OF INSULIN (HCC): Chronic | ICD-10-CM

## 2024-02-21 DIAGNOSIS — E11.49 OTHER DIABETIC NEUROLOGICAL COMPLICATION ASSOCIATED WITH TYPE 2 DIABETES MELLITUS (HCC): Chronic | ICD-10-CM

## 2024-02-22 RX ORDER — GABAPENTIN 600 MG/1
600 TABLET ORAL 2 TIMES DAILY
Qty: 60 TABLET | Refills: 10 | OUTPATIENT
Start: 2024-02-22

## 2024-02-22 NOTE — TELEPHONE ENCOUNTER
Last visit:   Last Med refill:   Does patient have enough medication for 72 hours: No:     Next Visit Date:  Future Appointments   Date Time Provider Department Center   3/5/2024 10:00 AM [facility] EMG  [facility] EM[facility]   3/5/2024 10:00 AM Kilo Kraus MD [facility]   3/13/2024 12:45 PM Ben Arroyo DPM [facility] Pod[facility]   3/27/2024  4:00 PM Sandoval Ortiz MD [facility]       Health Maintenance   Topic Date Due    COVID-19 Vaccine (1) Never done    Diabetic retinal exam  Never done    Colorectal Cancer Screen  Never done    Hepatitis B vaccine (2 of 3 - 19+ 3-dose series) 05/10/2019    Shingles vaccine (2 of 2) 02/01/2020    Diabetic Alb to Cr ratio (uACR) test  02/12/2022    Lipids  02/12/2022    GFR test (Diabetes, CKD 3-4, OR last GFR 15-59)  11/12/2022    Annual Wellness Visit (Medicare Advantage)  01/01/2024    A1C test (Diabetic or Prediabetic)  04/29/2024    Diabetic foot exam  12/01/2024    Depression Screen  01/31/2025    DTaP/Tdap/Td vaccine (2 - Td or Tdap) 08/10/2028    Flu vaccine  Completed    Pneumococcal 0-64 years Vaccine  Completed    Hepatitis C screen  Completed    HIV screen  Completed    Hepatitis A vaccine  Aged Out    Hib vaccine  Aged Out    Polio vaccine  Aged Out    Meningococcal (ACWY) vaccine  Aged Out       Hemoglobin A1C (%)   Date Value   01/29/2024 10.3   09/15/2022 12.1   05/20/2022 11.4             ( goal A1C is < 7)   No components found for: \"LABMICR\"  LDL Cholesterol (mg/dL)   Date Value   02/12/2021 46   02/12/2021 46       (goal LDL is <100)   AST (U/L)   Date Value   02/12/2021 26   02/12/2021 26     ALT (U/L)   Date Value   02/12/2021 27   02/12/2021 27     BUN (mg/dL)   Date Value   11/12/2021 13     BP Readings from Last 3 Encounters:   01/31/24 135/73   12/13/23 (!) 184/94   11/29/23 (!) 144/89          (goal 120/80)    All Future Testing planned in [facility]  Lab Frequency Next Occurrence   Basic Metabolic Panel Once 03/15/2023

## 2024-02-22 NOTE — TELEPHONE ENCOUNTER
Medical chart reviewed for gabapentin refill.  Of note, patient Gabapentin use discussed on 2/22/2024 visit.  Patient given given 30 days supply with 1 refill (60 days) on 1/31/2024 as patient indicated he is only using Gabapentin twice a day.  PDMP reviewed showed patient did  60 pills on 1/31/2024.  Patient additionally may still have a refill on the prescription given at last visit.  As such, patient is not due for Gabapentin until 3/31/2024 and writer will not be filling Gabapentin script at this time.  If patient needs further medication he will need to be seen in clinic to discuss further increased use of medication.     MA team to verify that request for medication came from patient and not pharmacy.  If from pharmacy, disregard the above.  If from patient, please notify patient of the above and schedule patient follow up accordingly if patient has used all Gabapentin prescribed on 1/31/2024.

## 2024-03-05 ENCOUNTER — TELEPHONE (OUTPATIENT)
Dept: FAMILY MEDICINE CLINIC | Age: 54
End: 2024-03-05

## 2024-03-05 DIAGNOSIS — G56.11 OTHER LESIONS OF MEDIAN NERVE, RIGHT UPPER LIMB: ICD-10-CM

## 2024-03-05 DIAGNOSIS — G62.9 NEUROPATHY: Primary | ICD-10-CM

## 2024-03-05 NOTE — TELEPHONE ENCOUNTER
New EMG order placed based on 2023 exam findings due to  order.      Please notify patient of order and provide phone number/scheduling info if needed.  Please let writer know if any questions or concerns.

## 2024-03-14 DIAGNOSIS — R10.13 DYSPEPSIA: Chronic | ICD-10-CM

## 2024-03-15 RX ORDER — FAMOTIDINE 40 MG/1
20 TABLET, FILM COATED ORAL 2 TIMES DAILY
Qty: 30 TABLET | Refills: 10 | Status: SHIPPED | OUTPATIENT
Start: 2024-03-15

## 2024-03-15 NOTE — TELEPHONE ENCOUNTER
Once 12/13/2023   Renin Once 01/31/2024   Aldosterone Once 01/31/2024   Urinalysis with Microscopic Once 01/31/2024   US RENAL COMPLETE Once 02/01/2024   Microalbumin, Ur Once 02/01/2024   Vascular ankle brachial index (GENNY) Once 02/06/2024   Baseline Diagnostic Sleep Study Once 02/06/2024   EMG Once 03/05/2024               Patient Active Problem List:     Type 2 diabetes mellitus with complication, with long-term current use of insulin (HCC)     Chest pain, atypical     Need for prophylactic vaccination and inoculation against varicella     Allergic rhinitis     Pain in right toe(s)     Foot infection     Ulcer of right foot, limited to breakdown of skin (HCC)     Chest pain on breathing     Diabetic polyneuropathy associated with type 2 diabetes mellitus (HCC)     Hyperlipidemia     Diabetes mellitus (HCC)     Class 2 severe obesity due to excess calories with serious comorbidity and body mass index (BMI) of 36.0 to 36.9 in adult (HCC)     Former smoker, stopped smoking in distant past     Peripheral vascular disorder (HCC)     Resistant hypertension

## 2024-04-15 ENCOUNTER — HOSPITAL ENCOUNTER (OUTPATIENT)
Age: 54
Setting detail: SPECIMEN
Discharge: HOME | End: 2024-04-15

## 2024-04-15 DIAGNOSIS — I1A.0 RESISTANT HYPERTENSION: Chronic | ICD-10-CM

## 2024-04-15 LAB
BACTERIA URNS QL MICRO: ABNORMAL
BILIRUB UR QL STRIP: NEGATIVE
CASTS #/AREA URNS LPF: ABNORMAL /LPF (ref 0–8)
CLARITY UR: CLEAR
COLOR UR: YELLOW
CREAT UR-MCNC: 226 MG/DL (ref 39–259)
EPI CELLS #/AREA URNS HPF: ABNORMAL /HPF (ref 0–5)
GLUCOSE UR STRIP-MCNC: ABNORMAL MG/DL
HGB UR QL STRIP.AUTO: NEGATIVE
KETONES UR STRIP-MCNC: NEGATIVE MG/DL
LEUKOCYTE ESTERASE UR QL STRIP: NEGATIVE
MICROALBUMIN UR-MCNC: <12 MG/L (ref 0–20)
MICROALBUMIN/CREAT UR-RTO: NORMAL MCG/MG CREAT (ref 0–17)
NITRITE UR QL STRIP: NEGATIVE
PH UR STRIP: 6 [PH] (ref 5–8)
PROT UR STRIP-MCNC: NEGATIVE MG/DL
RBC #/AREA URNS HPF: ABNORMAL /HPF (ref 0–4)
SP GR UR STRIP: 1.03 (ref 1–1.03)
UROBILINOGEN UR STRIP-ACNC: ABNORMAL EU/DL (ref 0–1)
WBC #/AREA URNS HPF: ABNORMAL /HPF (ref 0–5)

## 2024-04-15 NOTE — PROGRESS NOTES
[facility]  [facility]  [address]  [facility] [address]     Date of Visit:  2024  Patient Name: Raul Pantoja   Patient :  1970       Raul Pantoja is a 54 y.o. male who presents today for an general visit to be evaluated for the following condition(s):  Chief Complaint   Patient presents with    Diabetes     Follow up       Raul is a former smoker with history of uncontrolled HTN and uncontrolled IDDM with diabetic neuropathy (10.3% A1C 2024) as well as Hyperlipidemia, and angina p/today for uncontrolled diabetes and refill of his Gabapentin.  Patient PDMP review on 2024 showing his last Gabapentin medication fill at that time was on 2024 and patient was given a 30 day supply at that time.  Patient states he has been out of this medication for several weeks and would like to discuss this further.      Regarding patient blood pressure, his blood pressure today is still well controlled.  He is aware that his blood pressure goal being less than 140/80 as well as the importance of the DASH eating plan.  Patient states that he is still not monitoring his salt intake at this time.  We also discussed the importance of increasing patient's fruit and vegetable intake.  We also discussed at length the importance of getting the initially agreed upon Sleep study as confirmation of ALEJANDRO may help decrease his medication burden as he is currently on 3 medications for his blood pressure (BB, ACEI, HCTZ).  We also discussed that obtaining a CPAP for ALEJANDRO may decrease his risk of CVA, MI, pulmonary htn, permanent disability or worse.  We also reviewed that patient has not yet obtained the blood work requested for ruling out secondary hypertension and the importance of getting this done as well as the imaging study of the patient's kidneys as requested.  We discussed at length also the nature of obstructive sleep apnea and how this impacts her blood pressure and

## 2024-04-16 ENCOUNTER — OFFICE VISIT (OUTPATIENT)
Dept: FAMILY MEDICINE CLINIC | Age: 54
End: 2024-04-16
Payer: MEDICARE

## 2024-04-16 VITALS
SYSTOLIC BLOOD PRESSURE: 126 MMHG | HEART RATE: 90 BPM | WEIGHT: 265.2 LBS | BODY MASS INDEX: 35.92 KG/M2 | DIASTOLIC BLOOD PRESSURE: 73 MMHG | HEIGHT: 72 IN

## 2024-04-16 DIAGNOSIS — E11.49 OTHER DIABETIC NEUROLOGICAL COMPLICATION ASSOCIATED WITH TYPE 2 DIABETES MELLITUS (HCC): Chronic | ICD-10-CM

## 2024-04-16 DIAGNOSIS — Z79.4 TYPE 2 DIABETES MELLITUS WITH COMPLICATION, WITH LONG-TERM CURRENT USE OF INSULIN (HCC): Chronic | ICD-10-CM

## 2024-04-16 DIAGNOSIS — G62.9 NEUROPATHY: Chronic | ICD-10-CM

## 2024-04-16 DIAGNOSIS — E11.8 TYPE 2 DIABETES MELLITUS WITH COMPLICATION, WITH LONG-TERM CURRENT USE OF INSULIN (HCC): Chronic | ICD-10-CM

## 2024-04-16 LAB — HBA1C MFR BLD: 10.9 %

## 2024-04-16 PROCEDURE — 99213 OFFICE O/P EST LOW 20 MIN: CPT | Performed by: FAMILY MEDICINE

## 2024-04-16 PROCEDURE — G8417 CALC BMI ABV UP PARAM F/U: HCPCS | Performed by: FAMILY MEDICINE

## 2024-04-16 PROCEDURE — G8427 DOCREV CUR MEDS BY ELIG CLIN: HCPCS | Performed by: FAMILY MEDICINE

## 2024-04-16 PROCEDURE — 1036F TOBACCO NON-USER: CPT | Performed by: FAMILY MEDICINE

## 2024-04-16 PROCEDURE — 3078F DIAST BP <80 MM HG: CPT | Performed by: FAMILY MEDICINE

## 2024-04-16 PROCEDURE — 3074F SYST BP LT 130 MM HG: CPT | Performed by: FAMILY MEDICINE

## 2024-04-16 PROCEDURE — 3017F COLORECTAL CA SCREEN DOC REV: CPT | Performed by: FAMILY MEDICINE

## 2024-04-16 PROCEDURE — 83036 HEMOGLOBIN GLYCOSYLATED A1C: CPT | Performed by: FAMILY MEDICINE

## 2024-04-16 PROCEDURE — 3046F HEMOGLOBIN A1C LEVEL >9.0%: CPT | Performed by: FAMILY MEDICINE

## 2024-04-16 PROCEDURE — 2022F DILAT RTA XM EVC RTNOPTHY: CPT | Performed by: FAMILY MEDICINE

## 2024-04-16 RX ORDER — GABAPENTIN 600 MG/1
600 TABLET ORAL 2 TIMES DAILY
Qty: 180 TABLET | Refills: 0 | Status: SHIPPED | OUTPATIENT
Start: 2024-04-16 | End: 2024-07-15

## 2024-04-16 SDOH — ECONOMIC STABILITY: INCOME INSECURITY: HOW HARD IS IT FOR YOU TO PAY FOR THE VERY BASICS LIKE FOOD, HOUSING, MEDICAL CARE, AND HEATING?: NOT HARD AT ALL

## 2024-04-16 SDOH — ECONOMIC STABILITY: FOOD INSECURITY: WITHIN THE PAST 12 MONTHS, THE FOOD YOU BOUGHT JUST DIDN'T LAST AND YOU DIDN'T HAVE MONEY TO GET MORE.: NEVER TRUE

## 2024-04-16 SDOH — ECONOMIC STABILITY: FOOD INSECURITY: WITHIN THE PAST 12 MONTHS, YOU WORRIED THAT YOUR FOOD WOULD RUN OUT BEFORE YOU GOT MONEY TO BUY MORE.: NEVER TRUE

## 2024-04-16 ASSESSMENT — PATIENT HEALTH QUESTIONNAIRE - PHQ9
2. FEELING DOWN, DEPRESSED OR HOPELESS: NOT AT ALL
SUM OF ALL RESPONSES TO PHQ9 QUESTIONS 1 & 2: 0
SUM OF ALL RESPONSES TO PHQ QUESTIONS 1-9: 0
1. LITTLE INTEREST OR PLEASURE IN DOING THINGS: NOT AT ALL

## 2024-04-16 NOTE — PROGRESS NOTES
Visit Information    Have you changed or started any medications since your last visit including any over-the-counter medicines, vitamins, or herbal medicines? no   Are you having any side effects from any of your medications? -  no  Have you stopped taking any of your medications? Is so, why? -  no    Have you seen any other physician or provider since your last visit? No  Have you had any other diagnostic tests since your last visit? No  Have you been seen in the emergency room and/or had an admission to a hospital since we last saw you? No  Have you had your routine dental cleaning in the past 6 months? no    Have you activated your [employer] account? If not, what are your barriers? Yes     Patient Care Team:  Sandoval Ortiz MD as PCP - General (Family Medicine)  [facility]    Medical History Review  Past Medical, Family, and Social History reviewed and does not contribute to the patient presenting condition    Health Maintenance   Topic Date Due    COVID-19 Vaccine (1) Never done    Diabetic retinal exam  Never done    Colorectal Cancer Screen  Never done    Hepatitis B vaccine (2 of 3 - 19+ 3-dose series) 05/10/2019    Shingles vaccine (2 of 2) 02/01/2020    Lipids  02/12/2022    GFR test (Diabetes, CKD 3-4, OR last GFR 15-59)  11/12/2022    Annual Wellness Visit (Medicare Advantage)  01/01/2024    A1C test (Diabetic or Prediabetic)  04/29/2024    Diabetic foot exam  12/01/2024    Depression Screen  01/31/2025    Diabetic Alb to Cr ratio (uACR) test  04/15/2025    DTaP/Tdap/Td vaccine (2 - Td or Tdap) 08/10/2028    Flu vaccine  Completed    Pneumococcal 0-64 years Vaccine  Completed    Hepatitis C screen  Completed    HIV screen  Completed    Hepatitis A vaccine  Aged Out    Hib vaccine  Aged Out    Polio vaccine  Aged Out    Meningococcal (ACWY) vaccine  Aged Out

## 2024-04-16 NOTE — PATIENT INSTRUCTIONS
Thank you for letting us take care of you today. We hope all your questions were addressed. If a question was overlooked or something else comes to mind after you return home, please contact a member of your Care Team listed below.        Your Care Team at [facility] is Team #4  Sandoval Ortiz (Faculty)  Robb Winston (Resident)  Uma Nguyen (Resident)  Rosie Burnett (Resident)  Flor Patiño (Resident)  Radha Flores, EDUARD Us, EDUARD Holloway, EDUARD Walker, [facility]  Melinda Coker, [facility]  Rose Jacinto, [facility]  Roshni Joienr, [facility]  Ariana Wiley, EDUARD Delgado () CRISTIANE Valadez (Clinical Practice Manager)  Sravanthi Lopes [facility] (Clinical Pharmacist)       Office phone number: [phone]    If you need to get in right away due to illness, please be advised we have \"Same Day\" appointments available Monday-Friday. Please call us at [phone] option #3 to schedule your \"Same Day\" appointment.

## 2024-04-17 ENCOUNTER — TELEPHONE (OUTPATIENT)
Dept: FAMILY MEDICINE CLINIC | Age: 54
End: 2024-04-17

## 2024-04-17 DIAGNOSIS — I10 ESSENTIAL (PRIMARY) HYPERTENSION: ICD-10-CM

## 2024-04-17 DIAGNOSIS — I1A.0 RESISTANT HYPERTENSION: Primary | ICD-10-CM

## 2024-04-17 LAB
ALDOST SERPL-MCNC: 11.9 NG/DL
ALDOSTERONE COMMENT: NORMAL
RENIN COMMENT: NORMAL
RENIN PLAS-CCNC: 1 NG/ML/HR

## 2024-04-17 NOTE — TELEPHONE ENCOUNTER
Renin, aldosterone and ratio reviewed altogether with all of these indicating decreased likelihood of surgically curable Hyperaldosteronism (negative study) and no further work up for this at this time.  Patient still likely to benefit from sleep study to rule out ALEJANDRO which may be a factor in his resistant hypertension and patient still to complete Renal US with vascular study component to rule out AMANDA.  At this time, patient has yet to schedule his sleep study or his renal ultrasound.      MA team to notify patient of negative lab results as specified above, new renal vascular study which patient must complete with renal ultrasound but may need to be scheduled separately and assistance with scheduling patient for a sleep study in order to rule out other causes of high blood pressure (secondary).  MA team to let writer know if any questions or concerns.

## 2024-05-09 ASSESSMENT — ENCOUNTER SYMPTOMS: RESPIRATORY NEGATIVE: 1

## 2024-05-14 RX ORDER — METOPROLOL SUCCINATE 50 MG/1
50 TABLET, EXTENDED RELEASE ORAL DAILY
Qty: 30 TABLET | Refills: 10 | Status: SHIPPED | OUTPATIENT
Start: 2024-05-14

## 2024-05-14 NOTE — TELEPHONE ENCOUNTER
E-scribe request for METOPROLOL. Please review and e-scribe if applicable.     Last Visit Date:  4/16/2024  Next Visit Date:  Visit date not found    Hemoglobin A1C (%)   Date Value   04/16/2024 10.9   01/29/2024 10.3   09/15/2022 12.1             ( goal A1C is < 7)   No components found for: \"LABMICR\"  No components found for: \"LDLCHOLESTEROL\", \"LDLCALC\"    (goal LDL is <100)   AST (U/L)   Date Value   02/12/2021 26   02/12/2021 26     ALT (U/L)   Date Value   02/12/2021 27   02/12/2021 27     BUN (mg/dL)   Date Value   11/12/2021 13     BP Readings from Last 3 Encounters:   04/16/24 126/73   01/31/24 135/73   12/13/23 (!) 184/94          (goal 120/80)        Patient Active Problem List:     Type 2 diabetes mellitus with complication, with long-term current use of insulin (HCC)     Chest pain, atypical     Need for prophylactic vaccination and inoculation against varicella     Allergic rhinitis     Pain in right toe(s)     Foot infection     Ulcer of right foot, limited to breakdown of skin (HCC)     Chest pain on breathing     Diabetic polyneuropathy associated with type 2 diabetes mellitus (HCC)     Hyperlipidemia     Diabetes mellitus (HCC)     Class 2 severe obesity due to excess calories with serious comorbidity and body mass index (BMI) of 36.0 to 36.9 in adult (HCC)     Former smoker, stopped smoking in distant past     Peripheral vascular disorder (HCC)     Resistant hypertension      ----JF

## 2024-05-17 DIAGNOSIS — G62.9 NEUROPATHY: Chronic | ICD-10-CM

## 2024-05-17 DIAGNOSIS — E11.8 TYPE 2 DIABETES MELLITUS WITH COMPLICATION, WITH LONG-TERM CURRENT USE OF INSULIN (HCC): Chronic | ICD-10-CM

## 2024-05-17 DIAGNOSIS — E11.49 OTHER DIABETIC NEUROLOGICAL COMPLICATION ASSOCIATED WITH TYPE 2 DIABETES MELLITUS (HCC): Chronic | ICD-10-CM

## 2024-05-17 DIAGNOSIS — Z79.4 TYPE 2 DIABETES MELLITUS WITH COMPLICATION, WITH LONG-TERM CURRENT USE OF INSULIN (HCC): Chronic | ICD-10-CM

## 2024-05-20 RX ORDER — GABAPENTIN 600 MG/1
600 TABLET ORAL 2 TIMES DAILY
Qty: 60 TABLET | Refills: 1 | Status: SHIPPED | OUTPATIENT
Start: 2024-05-20 | End: 2024-07-19

## 2024-05-20 NOTE — TELEPHONE ENCOUNTER
E-scribe request for GABAPENTIN. Please review and e-scribe if applicable.     Last Visit Date:  4/16/2024  Next Visit Date:  Visit date not found    Hemoglobin A1C (%)   Date Value   04/16/2024 10.9   01/29/2024 10.3   09/15/2022 12.1             ( goal A1C is < 7)   No components found for: \"LABMICR\"  No components found for: \"LDLCHOLESTEROL\", \"LDLCALC\"    (goal LDL is <100)   AST (U/L)   Date Value   02/12/2021 26   02/12/2021 26     ALT (U/L)   Date Value   02/12/2021 27   02/12/2021 27     BUN (mg/dL)   Date Value   11/12/2021 13     BP Readings from Last 3 Encounters:   04/16/24 126/73   01/31/24 135/73   12/13/23 (!) 184/94          (goal 120/80)        Patient Active Problem List:     Type 2 diabetes mellitus with complication, with long-term current use of insulin (HCC)     Chest pain, atypical     Need for prophylactic vaccination and inoculation against varicella     Allergic rhinitis     Pain in right toe(s)     Foot infection     Ulcer of right foot, limited to breakdown of skin (HCC)     Chest pain on breathing     Diabetic polyneuropathy associated with type 2 diabetes mellitus (HCC)     Hyperlipidemia     Diabetes mellitus (HCC)     Class 2 severe obesity due to excess calories with serious comorbidity and body mass index (BMI) of 36.0 to 36.9 in adult (HCC)     Former smoker, stopped smoking in distant past     Peripheral vascular disorder (HCC)     Resistant hypertension      ----JF

## 2024-06-14 ENCOUNTER — TELEPHONE (OUTPATIENT)
Dept: FAMILY MEDICINE CLINIC | Age: 54
End: 2024-06-14

## 2024-06-14 NOTE — TELEPHONE ENCOUNTER
Baseline sleep test ordered on 1/31/2024 visit.  MA team to assist patient in scheduling/next steps.  Please let writer know if any further questions/concerns or if order needs to be altered.

## 2024-06-14 NOTE — TELEPHONE ENCOUNTER
----- Message from Sandra Workman sent at 6/14/2024 10:26 AM EDT -----  Regarding: ECC Referral Request  ECC Referral Request    Reason for referral request: other    Specialist/Lab/Test patient is requesting (if known): sleep apnea    Specialist Phone Number (if applicable):    Additional Information Patient wanted to request a referral for his sleep apnea.  --------------------------------------------------------------------------------------------------------------------------    Relationship to Patient: Self     Call Back Information: OK to leave message on voicemail  Preferred Call Back Number: Phone [phone] (home) [phone] (work)

## 2024-06-18 DIAGNOSIS — E11.49 OTHER DIABETIC NEUROLOGICAL COMPLICATION ASSOCIATED WITH TYPE 2 DIABETES MELLITUS (HCC): Chronic | ICD-10-CM

## 2024-06-18 DIAGNOSIS — E11.8 TYPE 2 DIABETES MELLITUS WITH COMPLICATION, WITH LONG-TERM CURRENT USE OF INSULIN (HCC): Chronic | ICD-10-CM

## 2024-06-18 DIAGNOSIS — E78.2 MIXED HYPERLIPIDEMIA: Chronic | ICD-10-CM

## 2024-06-18 DIAGNOSIS — G62.9 NEUROPATHY: Chronic | ICD-10-CM

## 2024-06-18 DIAGNOSIS — R10.13 DYSPEPSIA: Chronic | ICD-10-CM

## 2024-06-18 DIAGNOSIS — Z79.4 TYPE 2 DIABETES MELLITUS WITH COMPLICATION, WITH LONG-TERM CURRENT USE OF INSULIN (HCC): Chronic | ICD-10-CM

## 2024-06-18 DIAGNOSIS — I10 ESSENTIAL HYPERTENSION: ICD-10-CM

## 2024-06-18 RX ORDER — LISINOPRIL 40 MG/1
40 TABLET ORAL DAILY
Qty: 90 TABLET | Refills: 0 | Status: SHIPPED | OUTPATIENT
Start: 2024-06-18

## 2024-06-18 RX ORDER — HYDROCHLOROTHIAZIDE 25 MG/1
25 TABLET ORAL DAILY
Qty: 90 TABLET | Refills: 0 | Status: SHIPPED | OUTPATIENT
Start: 2024-06-18

## 2024-06-18 RX ORDER — FAMOTIDINE 40 MG/1
20 TABLET, FILM COATED ORAL 2 TIMES DAILY
Qty: 90 TABLET | Refills: 0 | Status: SHIPPED | OUTPATIENT
Start: 2024-06-18

## 2024-06-18 RX ORDER — GABAPENTIN 600 MG/1
600 TABLET ORAL 2 TIMES DAILY
Qty: 60 TABLET | Refills: 2 | Status: SHIPPED | OUTPATIENT
Start: 2024-06-18 | End: 2024-09-16

## 2024-06-18 RX ORDER — METOPROLOL SUCCINATE 50 MG/1
50 TABLET, EXTENDED RELEASE ORAL DAILY
Qty: 90 TABLET | Refills: 0 | Status: SHIPPED | OUTPATIENT
Start: 2024-06-18

## 2024-06-18 RX ORDER — ATORVASTATIN CALCIUM 40 MG/1
40 TABLET, FILM COATED ORAL DAILY
Qty: 90 TABLET | Refills: 0 | Status: SHIPPED | OUTPATIENT
Start: 2024-06-18

## 2024-06-18 RX ORDER — ASPIRIN 81 MG/1
81 TABLET, CHEWABLE ORAL DAILY
Qty: 90 TABLET | Refills: 0 | Status: SHIPPED | OUTPATIENT
Start: 2024-06-18

## 2024-06-18 NOTE — TELEPHONE ENCOUNTER
E-scribe request for PENDED MEDICATIONS. Please review and e-scribe if applicable.     Last Visit Date:  4/16/2024  Next Visit Date:  6/25/2024    Hemoglobin A1C (%)   Date Value   04/16/2024 10.9   01/29/2024 10.3   09/15/2022 12.1             ( goal A1C is < 7)   No components found for: \"LABMICR\"  No components found for: \"LDLCHOLESTEROL\", \"LDLCALC\"    (goal LDL is <100)   AST (U/L)   Date Value   02/12/2021 26   02/12/2021 26     ALT (U/L)   Date Value   02/12/2021 27   02/12/2021 27     BUN (mg/dL)   Date Value   11/12/2021 13     BP Readings from Last 3 Encounters:   04/16/24 126/73   01/31/24 135/73   12/13/23 (!) 184/94          (goal 120/80)        Patient Active Problem List:     Type 2 diabetes mellitus with complication, with long-term current use of insulin (HCC)     Chest pain, atypical     Need for prophylactic vaccination and inoculation against varicella     Allergic rhinitis     Pain in right toe(s)     Foot infection     Ulcer of right foot, limited to breakdown of skin (HCC)     Chest pain on breathing     Diabetic polyneuropathy associated with type 2 diabetes mellitus (HCC)     Hyperlipidemia     Diabetes mellitus ([facility])     Class 2 severe obesity due to excess calories with serious comorbidity and body mass index (BMI) of 36.0 to 36.9 in adult (HCC)     Former smoker, stopped smoking in distant past     Peripheral vascular disorder (HCC)     Resistant hypertension      ----JF

## 2024-06-18 NOTE — TELEPHONE ENCOUNTER
Patient medical chart reviewed due to multiple medication refill request with follow-up appointment scheduled on 6/25/2024.  Patient requesting refills for several medications including gabapentin.  Patient PDMP reviewed showing last refill of gabapentin for 30-day supply on 5/17/2024.  Of note, patient was also sent over 180 supply or 90-day supply on 4/16/2024 which is not implanted in PDMP.  As such, with consistent usage per PDMP, writer will provide patient with 30-day supply of gabapentin with 2 refills and review further at follow-up appointment or future appointment.  Of note, patient does have a normative CMP on file January 29, 2024 from the [employer] system.  Lipid panel was also done at that time with LDL less than 70 at 42.  Will provide 90 days worth of all medications.  Will also delete duplicate aspirin order as well.    MA team to notify patient of the above and let writer know if any questions or concerns.

## 2024-06-25 ENCOUNTER — OFFICE VISIT (OUTPATIENT)
Dept: FAMILY MEDICINE CLINIC | Age: 54
End: 2024-06-25
Payer: MEDICARE

## 2024-06-25 VITALS
WEIGHT: 271.2 LBS | SYSTOLIC BLOOD PRESSURE: 134 MMHG | HEART RATE: 87 BPM | HEIGHT: 72 IN | DIASTOLIC BLOOD PRESSURE: 81 MMHG | BODY MASS INDEX: 36.73 KG/M2

## 2024-06-25 DIAGNOSIS — M79.2 NEUROPATHIC PAIN: ICD-10-CM

## 2024-06-25 DIAGNOSIS — E11.8 TYPE 2 DIABETES MELLITUS WITH COMPLICATION, WITH LONG-TERM CURRENT USE OF INSULIN (HCC): Primary | ICD-10-CM

## 2024-06-25 DIAGNOSIS — Z79.4 TYPE 2 DIABETES MELLITUS WITH COMPLICATION, WITH LONG-TERM CURRENT USE OF INSULIN (HCC): Primary | ICD-10-CM

## 2024-06-25 DIAGNOSIS — R40.0 DAYTIME SLEEPINESS: ICD-10-CM

## 2024-06-25 DIAGNOSIS — I1A.0 RESISTANT HYPERTENSION: ICD-10-CM

## 2024-06-25 DIAGNOSIS — E16.2 HYPOGLYCEMIA: ICD-10-CM

## 2024-06-25 DIAGNOSIS — M79.642 LEFT HAND PAIN: ICD-10-CM

## 2024-06-25 DIAGNOSIS — G47.10 HYPERSOMNIA, UNSPECIFIED: ICD-10-CM

## 2024-06-25 PROCEDURE — G8427 DOCREV CUR MEDS BY ELIG CLIN: HCPCS | Performed by: FAMILY MEDICINE

## 2024-06-25 PROCEDURE — 3017F COLORECTAL CA SCREEN DOC REV: CPT | Performed by: FAMILY MEDICINE

## 2024-06-25 PROCEDURE — 3046F HEMOGLOBIN A1C LEVEL >9.0%: CPT | Performed by: FAMILY MEDICINE

## 2024-06-25 PROCEDURE — 3075F SYST BP GE 130 - 139MM HG: CPT | Performed by: FAMILY MEDICINE

## 2024-06-25 PROCEDURE — 1036F TOBACCO NON-USER: CPT | Performed by: FAMILY MEDICINE

## 2024-06-25 PROCEDURE — 2022F DILAT RTA XM EVC RTNOPTHY: CPT | Performed by: FAMILY MEDICINE

## 2024-06-25 PROCEDURE — 3079F DIAST BP 80-89 MM HG: CPT | Performed by: FAMILY MEDICINE

## 2024-06-25 PROCEDURE — G8417 CALC BMI ABV UP PARAM F/U: HCPCS | Performed by: FAMILY MEDICINE

## 2024-06-25 PROCEDURE — 99214 OFFICE O/P EST MOD 30 MIN: CPT | Performed by: FAMILY MEDICINE

## 2024-06-25 ASSESSMENT — PATIENT HEALTH QUESTIONNAIRE - PHQ9
SUM OF ALL RESPONSES TO PHQ QUESTIONS 1-9: 0
SUM OF ALL RESPONSES TO PHQ9 QUESTIONS 1 & 2: 0
2. FEELING DOWN, DEPRESSED OR HOPELESS: NOT AT ALL
1. LITTLE INTEREST OR PLEASURE IN DOING THINGS: NOT AT ALL

## 2024-06-25 NOTE — PROGRESS NOTES
Visit Information    Have you changed or started any medications since your last visit including any over-the-counter medicines, vitamins, or herbal medicines? no   Are you having any side effects from any of your medications? -  no  Have you stopped taking any of your medications? Is so, why? -  no    Have you seen any other physician or provider since your last visit? No  Have you had any other diagnostic tests since your last visit? No  Have you been seen in the emergency room and/or had an admission to a hospital since we last saw you? No  Have you had your routine dental cleaning in the past 6 months? no    Have you activated your [employer] account? If not, what are your barriers? Yes     Patient Care Team:  Sandoval Ortiz MD as PCP - General (Family Medicine)  [facility]    Medical History Review  Past Medical, Family, and Social History reviewed and does not contribute to the patient presenting condition    Health Maintenance   Topic Date Due    COVID-19 Vaccine (1) Never done    Diabetic retinal exam  Never done    Colorectal Cancer Screen  Never done    Hepatitis B vaccine (2 of 3 - 19+ 3-dose series) 05/10/2019    Shingles vaccine (2 of 2) 02/01/2020    Lipids  02/12/2022    GFR test (Diabetes, CKD 3-4, OR last GFR 15-59)  11/12/2022    Annual Wellness Visit (Medicare Advantage)  01/01/2024    A1C test (Diabetic or Prediabetic)  07/16/2024    Diabetic foot exam  12/01/2024    Diabetic Alb to Cr ratio (uACR) test  04/15/2025    Depression Screen  04/16/2025    DTaP/Tdap/Td vaccine (2 - Td or Tdap) 08/10/2028    Flu vaccine  Completed    Pneumococcal 0-64 years Vaccine  Completed    Hepatitis C screen  Completed    HIV screen  Completed    Hepatitis A vaccine  Aged Out    Hib vaccine  Aged Out    Polio vaccine  Aged Out    Meningococcal (ACWY) vaccine  Aged Out

## 2024-06-25 NOTE — PROGRESS NOTES
[facility]  [facility]  [address]  [facility] [address]     Date of Visit:  2024  Patient Name: Raul Pantoja   Patient :  1970       Raul Pantoja is a 54 y.o. male who presents today for an general visit to be evaluated for the following condition(s):  Chief Complaint   Patient presents with    Diabetes     Follow up    Mass     Patient has a bumps on left hand, patient states hand starts hurting the top of his hand feels hot     Sleep Apnea     Patient requesting a test to be done at home        Raul is a former smoker with history of uncontrolled HTN and uncontrolled IDDM with diabetic neuropathy (10.9% A1C 2024) as well as Hyperlipidemia, and angina p/today for uncontrolled diabetes.  He also complains of left hand/finger pain he would like to have addressed.  He is agreeable with discussing his elbow pain on the same side at a later time.      Regarding his uncontrolled diabetes, he has been experiencing low blood sugar readings in the mornings, with levels of 61 this morning, 50 on , and another low reading on Friday. He states hypoglycemia symptoms of shakes, sweats and fatigue.  He states the low blood sugars are usually middle of the night to the 7-9 AM.  Raul has discussed these low readings with Dr. Latham, his endocrinologist, who has adjusted his nighttime dosing.  Currently, he is taking Humalog insulin but is not on any long-acting insulin like Lantus. He is still taking his Metformin.  He reports having high blood sugar levels at night. He manually inputs the numbers from the Rian into his Omnipod insulin pump.  He is having issues with his insurance since switch and currently is concerned about getting a CGM that works with his Omnipod.  He is working with this with his Endocrinology team.  He reports that he only eats two meals a day and is not consistently taking insulin prior to meals.  He also is not consistently

## 2024-06-25 NOTE — PATIENT INSTRUCTIONS
Thank you for letting us take care of you today. We hope all your questions were addressed. If a question was overlooked or something else comes to mind after you return home, please contact a member of your Care Team listed below.      Your Care Team at [facility] is Team #  Sandoval Ortiz M.D. (Faculty)  Uma Nguyen M.D. (Resident)  Berlin English M.D. (Resident)   Ashley Porter M.D. (Resident)  Abdirashid Vigil M.D. (Resident)  Ashlyn Campo M.D. (Resident)  Roshni Joiner., [facility]  Alyce Holloway, [facility]  Melinda Coker, [facility]  Herman Us, EDUARD Walker, [facility]  Rose Jacinto, [facility]  Ariana Wiley, EDUARD Delgado (LJ) CRISTIANE Valadez (Clinical Practice Manager)  Sravanthi Lopes [facility] (Clinical Pharmacist)     Office phone number: [phone]    If you need to get in right away due to illness, please be advised we have \"Same Day\" appointments available Monday-Friday. Please call us at [phone] option #3 to schedule your \"Same Day\" appointment.

## 2024-07-23 ENCOUNTER — TELEPHONE (OUTPATIENT)
Dept: FAMILY MEDICINE CLINIC | Age: 54
End: 2024-07-23

## 2024-07-24 ENCOUNTER — TELEPHONE (OUTPATIENT)
Dept: FAMILY MEDICINE CLINIC | Age: 54
End: 2024-07-24

## 2024-07-25 ENCOUNTER — HOSPITAL ENCOUNTER (OUTPATIENT)
Dept: SLEEP CENTER | Age: 54
Discharge: HOME | End: 2024-07-27
Attending: FAMILY MEDICINE
Payer: MEDICARE

## 2024-07-25 DIAGNOSIS — R40.0 DAYTIME SLEEPINESS: ICD-10-CM

## 2024-07-25 DIAGNOSIS — I1A.0 RESISTANT HYPERTENSION: ICD-10-CM

## 2024-07-25 DIAGNOSIS — G47.10 HYPERSOMNIA, UNSPECIFIED: ICD-10-CM

## 2024-07-25 PROCEDURE — G0399 HOME SLEEP TEST/TYPE 3 PORTA: HCPCS

## 2024-08-08 LAB — STATUS: NORMAL

## 2024-08-12 ENCOUNTER — TELEPHONE (OUTPATIENT)
Dept: FAMILY MEDICINE CLINIC | Age: 54
End: 2024-08-12

## 2024-08-12 DIAGNOSIS — G47.33 OSA (OBSTRUCTIVE SLEEP APNEA): Primary | ICD-10-CM

## 2024-08-12 NOTE — TELEPHONE ENCOUNTER
Called patient regarding sleep titration study done at home showing mild ALEJANDRO.  Recommended that patient obtain sleep study with PAP titration, which is now ordered.  Writer unable to dial patient due to busy signal or leave voicemail.  See [employer] message 8/12/2024.  Patient does have follow up appointment with writer on 9/6/2024.

## 2024-08-27 DIAGNOSIS — R10.13 DYSPEPSIA: Chronic | ICD-10-CM

## 2024-08-27 DIAGNOSIS — I10 ESSENTIAL HYPERTENSION: ICD-10-CM

## 2024-08-27 NOTE — TELEPHONE ENCOUNTER
Last visit: 06/25/2024  Last Med refill: 06/18/2024  Does patient have enough medication for 72 hours: No:     Next Visit Date:  Future Appointments   Date Time Provider Department Center   9/6/2024 10:30 AM Sandoval Ortiz MD [facility] DEP       Health Maintenance   Topic Date Due    Diabetic retinal exam  Never done    Colorectal Cancer Screen  Never done    Hepatitis B vaccine (2 of 3 - 19+ 3-dose series) 05/10/2019    Shingles vaccine (2 of 2) 02/01/2020    Lipids  02/12/2022    GFR test (Diabetes, CKD 3-4, OR last GFR 15-59)  11/12/2022    COVID-19 Vaccine (1 - 2023-24 season) Never done    Annual Wellness Visit (Medicare Advantage)  01/01/2024    A1C test (Diabetic or Prediabetic)  07/16/2024    Flu vaccine (1) 08/01/2024    Diabetic foot exam  12/01/2024    Diabetic Alb to Cr ratio (uACR) test  04/15/2025    Depression Screen  06/25/2025    DTaP/Tdap/Td vaccine (2 - Td or Tdap) 08/10/2028    Pneumococcal 0-64 years Vaccine  Completed    Hepatitis C screen  Completed    HIV screen  Completed    Hepatitis A vaccine  Aged Out    Hib vaccine  Aged Out    Polio vaccine  Aged Out    Meningococcal (ACWY) vaccine  Aged Out       Hemoglobin A1C (%)   Date Value   04/16/2024 10.9   01/29/2024 10.3   09/15/2022 12.1             ( goal A1C is < 7)   No components found for: \"LABMICR\"  No components found for: \"LDLCHOLESTEROL\", \"LDLCALC\"    (goal LDL is <100)   AST (U/L)   Date Value   02/12/2021 26   02/12/2021 26     ALT (U/L)   Date Value   02/12/2021 27   02/12/2021 27     BUN (mg/dL)   Date Value   11/12/2021 13     BP Readings from Last 3 Encounters:   06/25/24 134/81   04/16/24 126/73   01/31/24 135/73          (goal 120/80)    All Future Testing planned in [facility]  Lab Frequency Next Occurrence   Vascular lower extremity arterial segmental pressures w PPG Once 12/13/2023   US RENAL COMPLETE Once 02/01/2024   Vascular ankle brachial index (GENNY) Once 02/06/2024   Baseline Diagnostic Sleep Study Once  02/06/2024   EMG Once 03/05/2024   Vascular duplex renal arterial complete Once 04/17/2024   Hemoglobin A1C Once 06/25/2024   US EXTREMITY JOINT LEFT NON VASC COMPLETE Once 06/25/2024   Sleep Study with PAP Titration Once 08/12/2024               Patient Active Problem List:     Type 2 diabetes mellitus with complication, with long-term current use of insulin (HCC)     Chest pain, atypical     Need for prophylactic vaccination and inoculation against varicella     Allergic rhinitis     Pain in right toe(s)     Foot infection     Ulcer of right foot, limited to breakdown of skin (HCC)     Chest pain on breathing     Diabetic polyneuropathy associated with type 2 diabetes mellitus (HCC)     Hyperlipidemia     Diabetes mellitus (HCC)     Class 2 severe obesity due to excess calories with serious comorbidity and body mass index (BMI) of 36.0 to 36.9 in adult (HCC)     Former smoker, stopped smoking in distant past     Peripheral vascular disorder (HCC)     Resistant hypertension

## 2024-08-30 RX ORDER — HYDROCHLOROTHIAZIDE 25 MG/1
25 TABLET ORAL DAILY
Qty: 90 TABLET | Refills: 3 | Status: SHIPPED | OUTPATIENT
Start: 2024-08-30

## 2024-08-30 RX ORDER — FAMOTIDINE 40 MG/1
TABLET, FILM COATED ORAL
Qty: 90 TABLET | Refills: 3 | Status: SHIPPED | OUTPATIENT
Start: 2024-08-30

## 2024-09-05 NOTE — PROGRESS NOTES
Attending Physician Statement  I have discussed the care of Jelani Taylor, 45 y.o. male,including pertinent history and exam findings,  with the resident Dr. Janice Jason MD.  History:  Chief Complaint   Patient presents with    Other     New odilia sensor     Patient unsure which BP medications he is taking. He is currently asymptomatic and following up with Endocrinology in 2 weeks. I have reviewed the key elements of the encounter with the resident. Examination was done by resident as documented in residents note. BP Readings from Last 3 Encounters:   09/30/22 (!) 152/93   09/15/22 (!) 144/84   05/20/22 126/88     BP (!) 152/93   Pulse 87   Ht 6' (1.829 m)   Wt 264 lb 3.2 oz (119.8 kg)   BMI 35.83 kg/m²   Lab Results   Component Value Date    WBC 6.7 11/12/2021    HGB 12.3 (L) 11/12/2021    HCT 38.1 (L) 11/12/2021     11/12/2021    CHOL 95 02/12/2021    CHOL 95 02/12/2021    TRIG 67 02/12/2021    TRIG 67 02/12/2021    HDL 36 (L) 02/12/2021    HDL 36 (L) 02/12/2021    ALT 27 02/12/2021    ALT 27 02/12/2021    AST 26 02/12/2021    AST 26 02/12/2021     11/12/2021    K 5.1 11/12/2021    CL 99 11/12/2021    CREATININE 0.96 11/12/2021    BUN 13 11/12/2021    CO2 26 11/12/2021    LABA1C 12.1 09/15/2022    LABMICR 32 (H) 02/12/2021     Lab Results   Component Value Date    CALCIUM 8.5 (L) 11/12/2021     Lab Results   Component Value Date    LDLCHOLESTEROL 46 02/12/2021    LDLCHOLESTEROL 46 02/12/2021     I agree with the assessment, plan and diagnosis of    Diagnosis Orders   1. Essential hypertension  metoprolol succinate (TOPROL XL) 50 MG extended release tablet      2. Type 2 diabetes mellitus with other specified complication, with long-term current use of insulin ([address]        3.  Type 2 diabetes mellitus with complication, with long-term current use of insulin (HCC)  Continuous Blood Gluc Sensor (FREESTYLE ODILIA 2 SENSOR) MISC    metoprolol succinate (TOPROL XL) 50 MG extended release tablet I agree with  orders as documented by the resident. Recommendations: Patient to not increase beta blockade at this time and resident to discuss further with patient to confirm his medications prior to starting a diuretic. We will start diuretic instead at this time and have patient bring in blood pressure medications. He is currently getting his diabetic drugs managed by Dr. Johnny Prajapati, Endocrinology. Return in about 1 month (around 10/30/2022) for follow up, HTN.    (Carin Oliveira ) Dr. Lew Jim MD Him/He

## 2024-09-13 DIAGNOSIS — E78.2 MIXED HYPERLIPIDEMIA: Chronic | ICD-10-CM

## 2024-09-13 DIAGNOSIS — I10 ESSENTIAL HYPERTENSION: ICD-10-CM

## 2024-09-16 PROBLEM — E10.42 DIABETIC POLYNEUROPATHY ASSOCIATED WITH TYPE 1 DIABETES MELLITUS (HCC): Status: ACTIVE | Noted: 2022-05-20

## 2024-09-16 PROBLEM — E10.65 UNCONTROLLED TYPE 1 DIABETES MELLITUS WITH HYPERGLYCEMIA (HCC): Status: ACTIVE | Noted: 2018-08-10

## 2024-09-16 RX ORDER — LISINOPRIL 40 MG/1
40 TABLET ORAL DAILY
Qty: 90 TABLET | Refills: 3 | Status: SHIPPED | OUTPATIENT
Start: 2024-09-16

## 2024-09-16 RX ORDER — ATORVASTATIN CALCIUM 40 MG/1
40 TABLET, FILM COATED ORAL DAILY
Qty: 90 TABLET | Refills: 3 | Status: SHIPPED | OUTPATIENT
Start: 2024-09-16

## 2024-10-07 PROBLEM — E11.9 DIABETES MELLITUS (HCC): Status: RESOLVED | Noted: 2022-09-30 | Resolved: 2024-10-07

## 2024-10-17 ENCOUNTER — TELEPHONE (OUTPATIENT)
Dept: FAMILY MEDICINE CLINIC | Age: 54
End: 2024-10-17

## 2024-10-17 NOTE — TELEPHONE ENCOUNTER
If pt calls regarding [employer] form, we received form, but he needs to come in and sign an  DARRIN, he did not answer, no vm

## 2024-10-25 DIAGNOSIS — R07.89 CHEST PAIN, ATYPICAL: ICD-10-CM

## 2024-10-25 DIAGNOSIS — I1A.0 RESISTANT HYPERTENSION: Primary | ICD-10-CM

## 2024-10-25 DIAGNOSIS — E11.8 TYPE 2 DIABETES MELLITUS WITH COMPLICATION, WITH LONG-TERM CURRENT USE OF INSULIN (HCC): Chronic | ICD-10-CM

## 2024-10-25 DIAGNOSIS — Z79.4 TYPE 2 DIABETES MELLITUS WITH COMPLICATION, WITH LONG-TERM CURRENT USE OF INSULIN (HCC): Chronic | ICD-10-CM

## 2024-10-25 DIAGNOSIS — I10 ESSENTIAL HYPERTENSION: ICD-10-CM

## 2024-10-27 NOTE — TELEPHONE ENCOUNTER
Last visit: 06/25/2024  Last Med refill:   Does patient have enough medication for 72 hours: No:     Next Visit Date:  Future Appointments   Date Time Provider Department Center   11/5/2024  9:00 AM STA ULTRASOUND RM 2 STAZ US STA Radiolog   11/5/2024 10:00 AM STA VASCULAR 1 STAZ VASCLAB STA Radiolog   11/5/2024 11:00 AM STA ULTRASOUND RM 3 STAZ US STA Radiolog   11/5/2024  1:00 PM STA VASCULAR 1 STAZ VASCLAB STA Radiolog       Health Maintenance   Topic Date Due    Diabetic retinal exam  Never done    Colorectal Cancer Screen  Never done    Hepatitis B vaccine (2 of 3 - 19+ 3-dose series) 05/10/2019    Shingles vaccine (2 of 2) 02/01/2020    Lipids  02/12/2022    GFR test (Diabetes, CKD 3-4, OR last GFR 15-59)  11/12/2022    Annual Wellness Visit (Medicare Advantage)  01/01/2024    A1C test (Diabetic or Prediabetic)  07/16/2024    Flu vaccine (1) 08/01/2024    COVID-19 Vaccine (1 - 2023-24 season) Never done    Diabetic foot exam  12/01/2024    Diabetic Alb to Cr ratio (uACR) test  04/15/2025    Depression Screen  06/25/2025    DTaP/Tdap/Td vaccine (2 - Td or Tdap) 08/10/2028    Pneumococcal 0-64 years Vaccine  Completed    Hepatitis C screen  Completed    HIV screen  Completed    Hepatitis A vaccine  Aged Out    Hib vaccine  Aged Out    Polio vaccine  Aged Out    Meningococcal (ACWY) vaccine  Aged Out       Hemoglobin A1C (%)   Date Value   04/16/2024 10.9   01/29/2024 10.3   09/15/2022 12.1             ( goal A1C is < 7)   No components found for: \"LABMICR\"  No components found for: \"LDLCHOLESTEROL\", \"LDLCALC\"    (goal LDL is <100)   AST (U/L)   Date Value   02/12/2021 26   02/12/2021 26     ALT (U/L)   Date Value   02/12/2021 27   02/12/2021 27     BUN (mg/dL)   Date Value   11/12/2021 13     BP Readings from Last 3 Encounters:   06/25/24 134/81   04/16/24 126/73   01/31/24 135/73          (goal 120/80)    All Future Testing planned in [facility]  Lab Frequency Next Occurrence   Vascular lower extremity arterial  Pt refusing D/C vitals. Skin warm and dry. RR even and unlabored. Pt AAOX4.

## 2024-10-28 RX ORDER — ASPIRIN 81 MG/1
TABLET, CHEWABLE ORAL
Qty: 90 TABLET | Refills: 1 | Status: SHIPPED | OUTPATIENT
Start: 2024-10-28

## 2024-10-28 RX ORDER — METOPROLOL SUCCINATE 50 MG/1
50 TABLET, EXTENDED RELEASE ORAL DAILY
Qty: 90 TABLET | Refills: 1 | Status: SHIPPED | OUTPATIENT
Start: 2024-10-28

## 2024-10-28 NOTE — TELEPHONE ENCOUNTER
Medical chart reviewed due to medication refill request for aspirin and Toprol.  Patient was to follow-up with writer in July and also was unable to make an appointment in October as well for his annual wellness visit as well as his chronic medical conditions.  Patient blood pressure at last visit was within goal of less than 140/90.  As such, writer will provide patient with requested refills of 90 days but only 1 refill instead of 3 as patient is to be seen at his earliest convenience for follow-up.    MA team to let patient know the above, assist patient in scheduling a follow-up with writer or department and let writer know if any questions or concerns.

## 2024-12-21 ENCOUNTER — HOSPITAL ENCOUNTER (OUTPATIENT)
Dept: VASCULAR LAB | Age: 54
Discharge: HOME | End: 2024-12-23
Attending: FAMILY MEDICINE
Payer: MEDICARE

## 2024-12-21 DIAGNOSIS — I1A.0 RESISTANT HYPERTENSION: ICD-10-CM

## 2024-12-21 DIAGNOSIS — I73.9 PERIPHERAL VASCULAR DISORDER (HCC): Chronic | ICD-10-CM

## 2024-12-21 DIAGNOSIS — I10 ESSENTIAL (PRIMARY) HYPERTENSION: ICD-10-CM

## 2024-12-21 LAB
VAS AORTA MID PSV: 93.5 CM/S
VAS AORTA PROX PSV: 20.5 CM/S
VAS AORTA PROX PSV: 93.6 CM/S
VAS L RENAL ORIG RI: 0.7
VAS LEFT ABI: 1.13
VAS LEFT ANKLE BP: 166 MMHG
VAS LEFT ARM BP: 147 MMHG
VAS LEFT DORSALIS PEDIS BP: 160 MMHG
VAS LEFT PTA BP: 166 MMHG
VAS LEFT RENAL ORIGIN EDV: 22.3 CM/S
VAS LEFT RENAL ORIGIN PSV: 73.5 CM/S
VAS LEFT RENAL ORIGIN RAR: 0.79
VAS LEFT RENAL RAR: 0.79
VAS LEFT TBI: 0.83
VAS LEFT TOE PRESSURE: 122 MMHG
VAS RIGHT ABI: 1.22
VAS RIGHT ANKLE BP: 180 MMHG
VAS RIGHT ARM BP: 147 MMHG
VAS RIGHT DORSALIS PEDIS BP: 180 MMHG
VAS RIGHT PTA BP: 177 MMHG
VAS RIGHT RENAL ORIGIN EDV: 22.3 CM/S
VAS RIGHT RENAL ORIGIN PSV: 89.9 CM/S
VAS RIGHT RENAL ORIGIN RAR: 0.96
VAS RIGHT RENAL ORIGIN RI: 0.75
VAS RIGHT RENAL RAR: 0.96
VAS RIGHT TBI: 0.8
VAS RIGHT TOE PRESSURE: 118 MMHG

## 2024-12-21 PROCEDURE — 93922 UPR/L XTREMITY ART 2 LEVELS: CPT

## 2024-12-21 PROCEDURE — 93975 VASCULAR STUDY: CPT

## 2024-12-26 ENCOUNTER — TELEPHONE (OUTPATIENT)
Dept: FAMILY MEDICINE CLINIC | Age: 54
End: 2024-12-26

## 2024-12-26 DIAGNOSIS — I10 ESSENTIAL HYPERTENSION: Primary | ICD-10-CM

## 2024-12-26 DIAGNOSIS — I1A.0 RESISTANT HYPERTENSION: ICD-10-CM

## 2024-12-26 NOTE — TELEPHONE ENCOUNTER
Results reviewed from vascular duplex renal artery study showing no significant stenosis noted however exam was limited due to bowel gas, etc.  Study was ordered due to Resistant hypertension.  Writer also reviewed ankle brachial index (GENNY) results showing normal GENNY reported compared to previous GENNY showing increased values possibly due to arterial calcification.  Of note, patient has yet to schedule a PAP titration study to be completed, which may be contributing to his uncontrolled blood pressure, and has not been able to follow up in clinic for his blood pressure or a medicare AWV.  He is also due for blood work including microalbumin, lipid panel, CMP, and CBC.    MA team to notify patient of the above negative results, assist patient with scheduling a follow-up with writer regarding his blood pressure and preventive care, assist patient provide patient with phone number for scheduling Pap with titration study, assist patient with lab work requested prior to visit with writer, and let writer know if any questions or concerns.

## 2025-01-15 DIAGNOSIS — I10 ESSENTIAL HYPERTENSION: ICD-10-CM

## 2025-01-15 DIAGNOSIS — I1A.0 RESISTANT HYPERTENSION: ICD-10-CM

## 2025-01-15 DIAGNOSIS — R07.89 CHEST PAIN, ATYPICAL: ICD-10-CM

## 2025-01-15 DIAGNOSIS — E78.2 MIXED HYPERLIPIDEMIA: Chronic | ICD-10-CM

## 2025-01-15 DIAGNOSIS — E11.49 OTHER DIABETIC NEUROLOGICAL COMPLICATION ASSOCIATED WITH TYPE 2 DIABETES MELLITUS (HCC): Chronic | ICD-10-CM

## 2025-01-15 DIAGNOSIS — G62.9 NEUROPATHY: Chronic | ICD-10-CM

## 2025-01-15 DIAGNOSIS — E11.8 TYPE 2 DIABETES MELLITUS WITH COMPLICATION, WITH LONG-TERM CURRENT USE OF INSULIN (HCC): Chronic | ICD-10-CM

## 2025-01-15 DIAGNOSIS — Z79.4 TYPE 2 DIABETES MELLITUS WITH COMPLICATION, WITH LONG-TERM CURRENT USE OF INSULIN (HCC): Chronic | ICD-10-CM

## 2025-01-15 RX ORDER — ASPIRIN 81 MG/1
81 TABLET, CHEWABLE ORAL DAILY
Qty: 90 TABLET | Refills: 0 | Status: SHIPPED | OUTPATIENT
Start: 2025-01-15

## 2025-01-15 RX ORDER — LISINOPRIL 40 MG/1
40 TABLET ORAL DAILY
Qty: 30 TABLET | Refills: 0 | Status: SHIPPED | OUTPATIENT
Start: 2025-01-15

## 2025-01-15 RX ORDER — HYDROCHLOROTHIAZIDE 25 MG/1
25 TABLET ORAL DAILY
Qty: 30 TABLET | Refills: 0 | Status: SHIPPED | OUTPATIENT
Start: 2025-01-15

## 2025-01-15 RX ORDER — ATORVASTATIN CALCIUM 40 MG/1
40 TABLET, FILM COATED ORAL DAILY
Qty: 90 TABLET | Refills: 0 | Status: SHIPPED | OUTPATIENT
Start: 2025-01-15

## 2025-01-15 RX ORDER — METOPROLOL SUCCINATE 50 MG/1
50 TABLET, EXTENDED RELEASE ORAL DAILY
Qty: 30 TABLET | Refills: 0 | Status: SHIPPED | OUTPATIENT
Start: 2025-01-15

## 2025-01-15 RX ORDER — GABAPENTIN 600 MG/1
600 TABLET ORAL 2 TIMES DAILY
Qty: 60 TABLET | Refills: 0 | Status: SHIPPED | OUTPATIENT
Start: 2025-01-15 | End: 2025-02-14

## 2025-01-15 NOTE — TELEPHONE ENCOUNTER
E-scribe request for med refill. Please review and e-scribe if applicable.     Last Visit Date:  6/25/2024  Next Visit Date:  1/27/2025    Hemoglobin A1C (%)   Date Value   04/16/2024 10.9   01/29/2024 10.3   09/15/2022 12.1             ( goal A1C is < 7)   No components found for: \"LABMICR\"  No components found for: \"LDLCHOLESTEROL\", \"LDLCALC\"    (goal LDL is <100)   AST (U/L)   Date Value   02/12/2021 26   02/12/2021 26     ALT (U/L)   Date Value   02/12/2021 27   02/12/2021 27     BUN (mg/dL)   Date Value   11/12/2021 13     BP Readings from Last 3 Encounters:   06/25/24 134/81   04/16/24 126/73   01/31/24 135/73          (goal 120/80)        Patient Active Problem List:     Uncontrolled type 1 diabetes mellitus with hyperglycemia (HCC)     Chest pain, atypical     Need for prophylactic vaccination and inoculation against varicella     Allergic rhinitis     Pain in right toe(s)     Foot infection     Ulcer of right foot, limited to breakdown of skin (HCC)     Chest pain on breathing     Diabetic polyneuropathy associated with type 1 diabetes mellitus (HCC)     Hyperlipidemia     Class 2 severe obesity due to excess calories with serious comorbidity and body mass index (BMI) of 36.0 to 36.9 in adult     Former smoker, stopped smoking in distant past     Peripheral vascular disorder (HCC)     Resistant hypertension      ----FROY

## 2025-01-15 NOTE — TELEPHONE ENCOUNTER
Medical chart reviewed due to medication refill request from a new pharmacy/exact [facility] pharmacy for longer supply of medication as well as confirming all negative patient's medications.  Patient also requesting gabapentin refill which is a moderate her substance in the state of [place].  Per PDMP patient last gabapentin refill was sent on 6/18/2024 and filled until 10/11/2024 for a 30-day supply.  Prior to this, patient had filled the last gabapentin refill on 9/2/2024 which is semiconsistent with 30-day supply however before that patient did not have any refills until 6/20/2024.  Patient is still very inconsistent with his gabapentin usage and we will need to discuss this further at his follow-up visit in the near future which is scheduled on 1/21/2025.  Additionally, patient has several labs ordered on 12/26/2024 including CMP, CBC, microalbumin, lipid panel.  Per protocols for all of his medications he is due for all of these labs explicitly his CMP and his lipid panel.  We will supply patient with a longer supply of his medications requested through [facility] such as a 90-day supply once we are able to safely prove that patient is doing okay with these medications and patient is evaluated in clinic as part of this.  As such, patient provided with 30 days worth of all medications requested outside of aspirin and atorvastatin.    MA team to notify patient of the above, assist patient will or mail patient requested labs to be completed prior to his visit on the 21st, and let writer know if any questions or concerns.

## 2025-01-20 ENCOUNTER — HOSPITAL ENCOUNTER (OUTPATIENT)
Age: 55
Setting detail: SPECIMEN
Discharge: HOME | End: 2025-01-20

## 2025-01-20 DIAGNOSIS — I1A.0 RESISTANT HYPERTENSION: ICD-10-CM

## 2025-01-20 DIAGNOSIS — I10 ESSENTIAL HYPERTENSION: ICD-10-CM

## 2025-01-20 LAB
ALBUMIN SERPL-MCNC: 4.4 G/DL (ref 3.5–5.2)
ALBUMIN/GLOB SERPL: 1.2 {RATIO} (ref 1–2.5)
ALP SERPL-CCNC: 88 U/L (ref 40–129)
ALT SERPL-CCNC: 30 U/L (ref 10–50)
ANION GAP SERPL CALCULATED.3IONS-SCNC: 9 MMOL/L (ref 9–16)
AST SERPL-CCNC: 29 U/L (ref 10–50)
BASOPHILS # BLD: 0.07 K/UL (ref 0–0.2)
BASOPHILS NFR BLD: 1 % (ref 0–2)
BILIRUB SERPL-MCNC: 0.6 MG/DL (ref 0–1.2)
BUN SERPL-MCNC: 14 MG/DL (ref 6–20)
CALCIUM SERPL-MCNC: 9.5 MG/DL (ref 8.6–10.4)
CHLORIDE SERPL-SCNC: 102 MMOL/L (ref 98–107)
CHOLEST SERPL-MCNC: 98 MG/DL (ref 0–199)
CHOLESTEROL/HDL RATIO: 2.8
CO2 SERPL-SCNC: 30 MMOL/L (ref 20–31)
CREAT SERPL-MCNC: 0.9 MG/DL (ref 0.7–1.2)
CREAT UR-MCNC: 145 MG/DL (ref 39–259)
EOSINOPHIL # BLD: 0.28 K/UL (ref 0–0.44)
EOSINOPHILS RELATIVE PERCENT: 5 % (ref 1–4)
ERYTHROCYTE [DISTWIDTH] IN BLOOD BY AUTOMATED COUNT: 12.7 % (ref 11.8–14.4)
GFR, ESTIMATED: >90 ML/MIN/1.73M2
GLUCOSE SERPL-MCNC: 102 MG/DL (ref 74–99)
HCT VFR BLD AUTO: 41.8 % (ref 40.7–50.3)
HDLC SERPL-MCNC: 35 MG/DL
HGB BLD-MCNC: 13.7 G/DL (ref 13–17)
IMM GRANULOCYTES # BLD AUTO: <0.03 K/UL (ref 0–0.3)
IMM GRANULOCYTES NFR BLD: 0 %
LDLC SERPL CALC-MCNC: 52 MG/DL (ref 0–100)
LYMPHOCYTES NFR BLD: 2.13 K/UL (ref 1.1–3.7)
LYMPHOCYTES RELATIVE PERCENT: 37 % (ref 24–43)
MCH RBC QN AUTO: 30.4 PG (ref 25.2–33.5)
MCHC RBC AUTO-ENTMCNC: 32.8 G/DL (ref 28.4–34.8)
MCV RBC AUTO: 92.9 FL (ref 82.6–102.9)
MICROALBUMIN UR-MCNC: <12 MG/L (ref 0–20)
MICROALBUMIN/CREAT UR-RTO: NORMAL MCG/MG CREAT (ref 0–17)
MONOCYTES NFR BLD: 0.51 K/UL (ref 0.1–1.2)
MONOCYTES NFR BLD: 9 % (ref 3–12)
NEUTROPHILS NFR BLD: 48 % (ref 36–65)
NEUTS SEG NFR BLD: 2.75 K/UL (ref 1.5–8.1)
NRBC BLD-RTO: 0 PER 100 WBC
PLATELET # BLD AUTO: 198 K/UL (ref 138–453)
PMV BLD AUTO: 10.6 FL (ref 8.1–13.5)
POTASSIUM SERPL-SCNC: 4 MMOL/L (ref 3.7–5.3)
PROT SERPL-MCNC: 8 G/DL (ref 6.6–8.7)
RBC # BLD AUTO: 4.5 M/UL (ref 4.21–5.77)
SODIUM SERPL-SCNC: 141 MMOL/L (ref 136–145)
TRIGL SERPL-MCNC: 54 MG/DL
VLDLC SERPL CALC-MCNC: 11 MG/DL (ref 1–30)
WBC OTHER # BLD: 5.8 K/UL (ref 3.5–11.3)

## 2025-01-27 ENCOUNTER — OFFICE VISIT (OUTPATIENT)
Dept: FAMILY MEDICINE CLINIC | Age: 55
End: 2025-01-27
Payer: COMMERCIAL

## 2025-01-27 VITALS
WEIGHT: 278.6 LBS | SYSTOLIC BLOOD PRESSURE: 127 MMHG | DIASTOLIC BLOOD PRESSURE: 88 MMHG | HEIGHT: 72 IN | BODY MASS INDEX: 37.73 KG/M2 | HEART RATE: 94 BPM

## 2025-01-27 DIAGNOSIS — Z79.4 TYPE 2 DIABETES MELLITUS WITH COMPLICATION, WITH LONG-TERM CURRENT USE OF INSULIN (HCC): Chronic | ICD-10-CM

## 2025-01-27 DIAGNOSIS — I10 ESSENTIAL HYPERTENSION: ICD-10-CM

## 2025-01-27 DIAGNOSIS — Z23 ENCOUNTER FOR IMMUNIZATION: ICD-10-CM

## 2025-01-27 DIAGNOSIS — E11.49 OTHER DIABETIC NEUROLOGICAL COMPLICATION ASSOCIATED WITH TYPE 2 DIABETES MELLITUS (HCC): ICD-10-CM

## 2025-01-27 DIAGNOSIS — Z12.5 PROSTATE CANCER SCREENING: ICD-10-CM

## 2025-01-27 DIAGNOSIS — E66.01 MORBID (SEVERE) OBESITY DUE TO EXCESS CALORIES: ICD-10-CM

## 2025-01-27 DIAGNOSIS — R10.13 DYSPEPSIA: Chronic | ICD-10-CM

## 2025-01-27 DIAGNOSIS — Z00.00 MEDICARE ANNUAL WELLNESS VISIT, SUBSEQUENT: Primary | ICD-10-CM

## 2025-01-27 DIAGNOSIS — E11.8 TYPE 2 DIABETES MELLITUS WITH COMPLICATION, WITH LONG-TERM CURRENT USE OF INSULIN (HCC): Chronic | ICD-10-CM

## 2025-01-27 DIAGNOSIS — Z12.11 COLON CANCER SCREENING: ICD-10-CM

## 2025-01-27 DIAGNOSIS — R94.39 ABNORMAL STRESS TEST: Primary | ICD-10-CM

## 2025-01-27 DIAGNOSIS — I20.9 ANGINA PECTORIS (HCC): ICD-10-CM

## 2025-01-27 PROBLEM — L97.511 ULCER OF RIGHT FOOT, LIMITED TO BREAKDOWN OF SKIN (HCC): Status: RESOLVED | Noted: 2021-11-29 | Resolved: 2025-01-27

## 2025-01-27 PROCEDURE — 90746 HEPB VACCINE 3 DOSE ADULT IM: CPT | Performed by: FAMILY MEDICINE

## 2025-01-27 PROCEDURE — 90656 IIV3 VACC NO PRSV 0.5 ML IM: CPT | Performed by: FAMILY MEDICINE

## 2025-01-27 RX ORDER — FAMOTIDINE 40 MG/1
40 TABLET, FILM COATED ORAL 2 TIMES DAILY
Qty: 90 TABLET | Refills: 3 | Status: SHIPPED | OUTPATIENT
Start: 2025-01-27

## 2025-01-27 RX ORDER — ASPIRIN 81 MG/1
81 TABLET, CHEWABLE ORAL DAILY
Qty: 90 TABLET | Refills: 0 | Status: SHIPPED | OUTPATIENT
Start: 2025-01-27

## 2025-01-27 SDOH — ECONOMIC STABILITY: FOOD INSECURITY: WITHIN THE PAST 12 MONTHS, YOU WORRIED THAT YOUR FOOD WOULD RUN OUT BEFORE YOU GOT MONEY TO BUY MORE.: NEVER TRUE

## 2025-01-27 SDOH — ECONOMIC STABILITY: FOOD INSECURITY: WITHIN THE PAST 12 MONTHS, THE FOOD YOU BOUGHT JUST DIDN'T LAST AND YOU DIDN'T HAVE MONEY TO GET MORE.: NEVER TRUE

## 2025-01-27 ASSESSMENT — PATIENT HEALTH QUESTIONNAIRE - PHQ9
1. LITTLE INTEREST OR PLEASURE IN DOING THINGS: NOT AT ALL
SUM OF ALL RESPONSES TO PHQ QUESTIONS 1-9: 0
SUM OF ALL RESPONSES TO PHQ QUESTIONS 1-9: 0
2. FEELING DOWN, DEPRESSED OR HOPELESS: NOT AT ALL
SUM OF ALL RESPONSES TO PHQ QUESTIONS 1-9: 0
SUM OF ALL RESPONSES TO PHQ9 QUESTIONS 1 & 2: 0
SUM OF ALL RESPONSES TO PHQ QUESTIONS 1-9: 0

## 2025-01-27 NOTE — PATIENT INSTRUCTIONS
Thank you for letting us take care of you today. We hope all your questions were addressed. If a question was overlooked or something else comes to mind after you return home, please contact a member of your Care Team listed below.      Your Care Team at [facility] is Team #  Sandoval Ortiz M.D. (Faculty)  Uma Nguyen M.D. (Resident)  Berlin English M.D. (Resident)   Ashley Porter M.D. (Resident)  Abidrashid Vigil M.D. (Resident)  Ashlyn Campo M.D. (Resident)  Roshni Joiner., [facility]  Alyce Holloway, [facility]  Melinda Coker, [facility]  Hemanth Walker, [facility]  Rose Jacinto, [facility]  Ariana Wiley, [facility]  Dexter Delgado (LJ) CRISTIANE Valadez (Clinical Practice Manager)  Sravanthi Lopes [facility] (Clinical Pharmacist)     Office phone number: [phone]    If you need to get in right away due to illness, please be advised we have \"Same Day\" appointments available Monday-Friday. Please call us at [phone] option #3 to schedule your \"Same Day\" appointment.          Preventing Falls: Care Instructions  Injuries and health problems such as trouble walking or poor eyesight can increase your risk of falling. So can some medicines. But there are things you can do to help prevent falls. You can exercise to get stronger. You can also arrange your home to make it safer.    Talk to your doctor about the medicines you take. Ask if any of them increase the risk of falls and whether they can be changed or stopped.   Try to exercise regularly. It can help improve your strength and balance. This can help lower your risk of falling.         Practice fall safety and prevention.   Wear low-heeled shoes that fit well and give your feet good support. Talk to your doctor if you have foot problems that make this hard.  Carry a cellphone or wear a medical alert device that you can use to call for help.  Use stepladders instead of chairs to reach high objects. Don't climb if you're at risk for falls. Ask for help, if needed.  Wear the correct

## 2025-01-27 NOTE — PROGRESS NOTES
Medicare Annual Wellness Visit    Raul Pantoja is here for Medicare AWV and Discuss Medications (Discuss refills )    Assessment & Plan     Medicare Annual wellness visit, subsequent   - Due for various preventive care measures  - Family history of colon cancer increases risk for colorectal cancer  - Patient is elevated risk for cardiovascular events with reported Angina, uncontrolled DM, likely resistant HTN due to ALEJANDRO that is untreated, need for lifestyle interventions/nutrition changes/exercise (pending cardiology work up)/sleep/stress/etc   Plan:  - Ordered screening colonoscopy ([facility]) due to Fhx of colon cancer and being above average risk for CRC  - Administered hepatitis B vaccine and influenza vaccine after review of risks and benefits  - Advised to obtain shingles and COVID-19 vaccines at local pharmacy  - Discussed PSA testing for prostate cancer screening; patient agreed to test  - Ordered PSA test, if abnormal will refer to Urology   - Offered referral to dermatologist for skin cancer screening, patient declined skin check in office   - Discussed firearm safety  - Confirmed advanced care directives (full code status)  - Patient advised to follow up with dentistry and his eye doctor especially for his DM eye exam   - Patient declines any assistance at this time performing ADL's/IADLs and does not want assistance in this regard at this time     Angina  - Recent episode of chest pain during Christmas, consistent with angina pectoris  - Increased frequency suggests possible progression of underlying coronary artery disease  - Patient denies any c  Plan:  - Referral to cardiologist Dr. Levine  - Stress test ordered  - Advised to go to ER if chest pain recurs, need for Nitroglycerin or any worsening symptoms  - Discussed importance of nutrition and exercise in managing heart health  - Recommended 150 minutes of moderate-intensity exercise per week pending stress test results and cardiology

## 2025-01-27 NOTE — PROGRESS NOTES
Patient is yet to follow-up with cardiology after some discussions and previous order for cardiology with previous reported angina history and workup concerning for Abnormal septal wall motion noted., Mild concentric left ventricular hypertrophy with increased septal hypertrophy with nuclear study that was felt to be low risk back in May 2023.  Cardiology referral replaced as such.

## 2025-01-27 NOTE — PROGRESS NOTES
Visit Information    Have you changed or started any medications since your last visit including any over-the-counter medicines, vitamins, or herbal medicines? no   Are you having any side effects from any of your medications? -  no  Have you stopped taking any of your medications? Is so, why? -  no    Have you seen any other physician or provider since your last visit? No  Have you had any other diagnostic tests since your last visit? Yes - Records Obtained, labs   Have you been seen in the emergency room and/or had an admission to a hospital since we last saw you? No  Have you had your routine dental cleaning in the past 6 months? no    Have you activated your [employer] account? If not, what are your barriers? Yes     Patient Care Team:  Sandoval Ortiz MD as PCP - General (Family Medicine)  Sandoval Ortiz MD as PCP - Empaneled Provider    Medical History Review  Past Medical, Family, and Social History reviewed and does not contribute to the patient presenting condition    Health Maintenance   Topic Date Due    Diabetic retinal exam  Never done    Colorectal Cancer Screen  Never done    Hepatitis B vaccine (2 of 3 - 19+ 3-dose series) 05/10/2019    Shingles vaccine (2 of 2) 02/01/2020    A1C test (Diabetic or Prediabetic)  07/16/2024    Flu vaccine (1) 08/01/2024    COVID-19 Vaccine (1 - 2023-24 season) Never done    Diabetic foot exam  12/01/2024    Annual Wellness Visit (Medicare)  12/18/2024    Depression Screen  06/25/2025    Diabetic Alb to Cr ratio (uACR) test  01/20/2026    Lipids  01/20/2026    GFR test (Diabetes, CKD 3-4, OR last GFR 15-59)  01/20/2026    DTaP/Tdap/Td vaccine (2 - Td or Tdap) 08/10/2028    Pneumococcal 0-64 years Vaccine  Completed    Hepatitis C screen  Completed    HIV screen  Completed    Hepatitis A vaccine  Aged Out    Hib vaccine  Aged Out    Polio vaccine  Aged Out    Meningococcal (ACWY) vaccine  Aged Out

## 2025-02-04 DIAGNOSIS — E11.8 TYPE 2 DIABETES MELLITUS WITH COMPLICATION, WITH LONG-TERM CURRENT USE OF INSULIN (HCC): Chronic | ICD-10-CM

## 2025-02-04 DIAGNOSIS — I10 ESSENTIAL HYPERTENSION: ICD-10-CM

## 2025-02-04 DIAGNOSIS — R07.89 CHEST PAIN, ATYPICAL: ICD-10-CM

## 2025-02-04 DIAGNOSIS — E11.49 OTHER DIABETIC NEUROLOGICAL COMPLICATION ASSOCIATED WITH TYPE 2 DIABETES MELLITUS (HCC): Chronic | ICD-10-CM

## 2025-02-04 DIAGNOSIS — G62.9 NEUROPATHY: Chronic | ICD-10-CM

## 2025-02-04 DIAGNOSIS — I1A.0 RESISTANT HYPERTENSION: ICD-10-CM

## 2025-02-04 DIAGNOSIS — Z79.4 TYPE 2 DIABETES MELLITUS WITH COMPLICATION, WITH LONG-TERM CURRENT USE OF INSULIN (HCC): Chronic | ICD-10-CM

## 2025-02-05 NOTE — TELEPHONE ENCOUNTER
Last visit: 01/27/2025  Last Med refill: 01/15/2025  Does patient have enough medication for 72 hours: Yes    Next Visit Date:  Future Appointments   Date Time Provider Department Center   2/11/2025 11:15 AM STA ULTRASOUND RM 2 STAZ US [facility]   2/12/2025 10:30 AM SCHEDULE, [facility]   2/17/2025  9:00 AM STV NM ROOM 1 [facility] NUC MED [facility]   2/17/2025 10:00 AM STV NM SPECT/CT [facility]   2/17/2025 10:30 AM STV STRESS LAB 1 [facility] ASHLEY [facility]   2/17/2025 11:00 AM STV NM SPECT/CT [facility] NUC MED [facility]   3/5/2025  3:30 PM Sandoval Ortiz MD [facility]       Health Maintenance   Topic Date Due    Diabetic retinal exam  Never done    Colorectal Cancer Screen  Never done    Shingles vaccine (2 of 2) 02/01/2020    A1C test (Diabetic or Prediabetic)  07/16/2024    COVID-19 Vaccine (1 - 2023-24 season) Never done    Diabetic foot exam  12/01/2024    Hepatitis B vaccine (3 of 3 - 19+ 3-dose series) 03/24/2025    Diabetic Alb to Cr ratio (uACR) test  01/20/2026    Lipids  01/20/2026    GFR test (Diabetes, CKD 3-4, OR last GFR 15-59)  01/20/2026    Depression Screen  01/27/2026    Annual Wellness Visit (Medicare)  01/28/2026    DTaP/Tdap/Td vaccine (2 - Td or Tdap) 08/10/2028    Flu vaccine  Completed    Pneumococcal 0-64 years Vaccine  Completed    Hepatitis C screen  Completed    HIV screen  Completed    Hepatitis A vaccine  Aged Out    Hib vaccine  Aged Out    Polio vaccine  Aged Out    Meningococcal (ACWY) vaccine  Aged Out       Hemoglobin A1C (%)   Date Value   04/16/2024 10.9   01/29/2024 10.3   09/15/2022 12.1             ( goal A1C is < 7)   No components found for: \"LABMICR\"  No components found for: \"LDLCHOLESTEROL\", \"LDLCALC\"    (goal LDL is <100)   AST (U/L)   Date Value   01/20/2025 29     ALT (U/L)   Date Value   01/20/2025 30     BUN (mg/dL)   Date Value   01/20/2025 14     BP Readings from Last 3 Encounters:   01/27/25 127/88

## 2025-02-10 RX ORDER — METOPROLOL SUCCINATE 50 MG/1
50 TABLET, EXTENDED RELEASE ORAL DAILY
Qty: 30 TABLET | Refills: 11 | Status: SHIPPED | OUTPATIENT
Start: 2025-02-10

## 2025-02-10 RX ORDER — HYDROCHLOROTHIAZIDE 25 MG/1
TABLET ORAL
Qty: 30 TABLET | Refills: 11 | Status: SHIPPED | OUTPATIENT
Start: 2025-02-10

## 2025-02-10 RX ORDER — GABAPENTIN 600 MG/1
600 TABLET ORAL 2 TIMES DAILY
Qty: 60 TABLET | Refills: 0 | Status: SHIPPED | OUTPATIENT
Start: 2025-02-13 | End: 2025-03-15

## 2025-02-10 NOTE — TELEPHONE ENCOUNTER
Medical chart reviewed due to medication refill request for hydrochlorothiazide, gabapentin, and metoprolol.  Per last visit with patient blood pressure well-controlled we will provide patient with requested year supply of hydrochlorothiazide as well as metoprolol.  Of note, patient does have a scheduled stress test/etc on 2/17/2025 and this may change his medications including metoprolol pending the results.  PDMP also reviewed regarding his gabapentin refill with his last 30-day supply filled on 1/17/2025 (ending 2/16/2025).  Patient current use consistent with record.  As such we will provide patient with a 30-day supply of the gabapentin starting 2/13/2025 especially with 2/16/2025 being on weekend.    MA team to notify patient of the above, that his 12-month supply of medications were refilled except for gabapentin, that we may need to change his medications based on stress test results, and let writer know if any questions or concerns.

## 2025-02-17 ENCOUNTER — HOSPITAL ENCOUNTER (OUTPATIENT)
Dept: NUCLEAR MEDICINE | Age: 55
Discharge: HOME | End: 2025-02-19
Attending: FAMILY MEDICINE
Payer: COMMERCIAL

## 2025-02-17 ENCOUNTER — HOSPITAL ENCOUNTER (OUTPATIENT)
Age: 55
Discharge: HOME | End: 2025-02-19
Attending: FAMILY MEDICINE
Payer: COMMERCIAL

## 2025-02-17 DIAGNOSIS — I20.9 ANGINA PECTORIS: ICD-10-CM

## 2025-02-17 LAB
STRESS BASELINE DIAS BP: 101 MMHG
STRESS BASELINE HR: 86 BPM
STRESS BASELINE SYS BP: 145 MMHG
STRESS ESTIMATED WORKLOAD: 1 METS
STRESS PEAK DIAS BP: 86 MMHG
STRESS PEAK SYS BP: 149 MMHG
STRESS PERCENT HR ACHIEVED: 65 %
STRESS POST PEAK HR: 106 BPM
STRESS RATE PRESSURE PRODUCT: NORMAL BPM*MMHG
STRESS TARGET HR: 163 BPM

## 2025-02-17 PROCEDURE — 2500000003 HC RX 250 WO HCPCS: Performed by: FAMILY MEDICINE

## 2025-02-17 PROCEDURE — 78452 HT MUSCLE IMAGE SPECT MULT: CPT

## 2025-02-17 PROCEDURE — 93017 CV STRESS TEST TRACING ONLY: CPT

## 2025-02-17 PROCEDURE — 6360000002 HC RX W HCPCS: Performed by: FAMILY MEDICINE

## 2025-02-17 PROCEDURE — A9500 TC99M SESTAMIBI: HCPCS | Performed by: FAMILY MEDICINE

## 2025-02-17 PROCEDURE — 3430000000 HC RX DIAGNOSTIC RADIOPHARMACEUTICAL: Performed by: FAMILY MEDICINE

## 2025-02-17 RX ORDER — TETRAKIS(2-METHOXYISOBUTYLISOCYANIDE)COPPER(I) TETRAFLUOROBORATE 1 MG/ML
40 INJECTION, POWDER, LYOPHILIZED, FOR SOLUTION INTRAVENOUS
Status: COMPLETED | OUTPATIENT
Start: 2025-02-17 | End: 2025-02-17

## 2025-02-17 RX ORDER — ATROPINE SULFATE 0.1 MG/ML
0.5 INJECTION INTRAVENOUS EVERY 5 MIN PRN
Status: DISCONTINUED | OUTPATIENT
Start: 2025-02-17 | End: 2025-02-17

## 2025-02-17 RX ORDER — NITROGLYCERIN 0.4 MG/1
0.4 TABLET SUBLINGUAL EVERY 5 MIN PRN
Status: DISCONTINUED | OUTPATIENT
Start: 2025-02-17 | End: 2025-02-17

## 2025-02-17 RX ORDER — ALBUTEROL SULFATE 90 UG/1
2 INHALANT RESPIRATORY (INHALATION) PRN
Status: DISCONTINUED | OUTPATIENT
Start: 2025-02-17 | End: 2025-02-17

## 2025-02-17 RX ORDER — SODIUM CHLORIDE 0.9 % (FLUSH) 0.9 %
10 SYRINGE (ML) INJECTION PRN
Status: DISCONTINUED | OUTPATIENT
Start: 2025-02-17 | End: 2025-02-20 | Stop reason: HOSPADM

## 2025-02-17 RX ORDER — AMINOPHYLLINE 25 MG/ML
50 INJECTION, SOLUTION INTRAVENOUS PRN
Status: DISCONTINUED | OUTPATIENT
Start: 2025-02-17 | End: 2025-02-17

## 2025-02-17 RX ORDER — METOPROLOL TARTRATE 1 MG/ML
5 INJECTION, SOLUTION INTRAVENOUS EVERY 5 MIN PRN
Status: DISCONTINUED | OUTPATIENT
Start: 2025-02-17 | End: 2025-02-17

## 2025-02-17 RX ORDER — SODIUM CHLORIDE 9 MG/ML
500 INJECTION, SOLUTION INTRAVENOUS CONTINUOUS PRN
Status: DISCONTINUED | OUTPATIENT
Start: 2025-02-17 | End: 2025-02-17

## 2025-02-17 RX ORDER — TETRAKIS(2-METHOXYISOBUTYLISOCYANIDE)COPPER(I) TETRAFLUOROBORATE 1 MG/ML
14.3 INJECTION, POWDER, LYOPHILIZED, FOR SOLUTION INTRAVENOUS
Status: COMPLETED | OUTPATIENT
Start: 2025-02-17 | End: 2025-02-17

## 2025-02-17 RX ORDER — SODIUM CHLORIDE 0.9 % (FLUSH) 0.9 %
5-40 SYRINGE (ML) INJECTION PRN
Status: DISCONTINUED | OUTPATIENT
Start: 2025-02-17 | End: 2025-02-17

## 2025-02-17 RX ORDER — REGADENOSON 0.08 MG/ML
0.4 INJECTION, SOLUTION INTRAVENOUS
Status: COMPLETED | OUTPATIENT
Start: 2025-02-17 | End: 2025-02-17

## 2025-02-17 RX ADMIN — REGADENOSON 0.4 MG: 0.08 INJECTION, SOLUTION INTRAVENOUS at 11:15

## 2025-02-17 RX ADMIN — TETRAKIS(2-METHOXYISOBUTYLISOCYANIDE)COPPER(I) TETRAFLUOROBORATE 14.3 MILLICURIE: 1 INJECTION, POWDER, LYOPHILIZED, FOR SOLUTION INTRAVENOUS at 09:15

## 2025-02-17 RX ADMIN — SODIUM CHLORIDE, PRESERVATIVE FREE 10 ML: 5 INJECTION INTRAVENOUS at 11:15

## 2025-02-17 RX ADMIN — SODIUM CHLORIDE, PRESERVATIVE FREE 10 ML: 5 INJECTION INTRAVENOUS at 09:15

## 2025-02-17 RX ADMIN — TETRAKIS(2-METHOXYISOBUTYLISOCYANIDE)COPPER(I) TETRAFLUOROBORATE 40 MILLICURIE: 1 INJECTION, POWDER, LYOPHILIZED, FOR SOLUTION INTRAVENOUS at 11:15

## 2025-02-21 DIAGNOSIS — I20.9 ANGINA PECTORIS: Primary | ICD-10-CM

## 2025-02-21 DIAGNOSIS — R10.13 DYSPEPSIA: Chronic | ICD-10-CM

## 2025-02-21 RX ORDER — ASPIRIN 81 MG/1
81 TABLET ORAL
Qty: 90 TABLET | Refills: 0 | Status: SHIPPED | OUTPATIENT
Start: 2025-02-21

## 2025-02-21 RX ORDER — FAMOTIDINE 40 MG/1
40 TABLET, FILM COATED ORAL 2 TIMES DAILY
Qty: 180 TABLET | Refills: 0 | Status: SHIPPED | OUTPATIENT
Start: 2025-02-21 | End: 2025-05-22

## 2025-02-21 NOTE — TELEPHONE ENCOUNTER
Called patient re: results from Lexiscan stress test showing patient to be low risk at this time however there was issues with the stress ECG being non-diagnostic due to conduction delay.  Writer unable to reach patient at this time and left voicemail with instructions to call the clinic at his earliest convenience as well hours of operation to clinic, phone number and writer availability.      Will place referral for further cardiology evaluation and management at this due to the above issues with the stress test and high suspicion for cardiac disease at this time (Hx of smoking, uncontrolled T1DM, HLP, HTN, etc).  Will also need to revisit ER precautions with patient including but not limited to recurrence of chest pain, difficulty breathing, syncope, etc.  We will also revisit if patient has been having any more anginal episodes.  Will also need to discuss with patient possibly obtaining CCTA vs SPECT testing as well pending the above, patient wishes and cardiology availability.      Writer also refilled patient aspirin and Pepcid as well.

## 2025-02-25 DIAGNOSIS — I10 ESSENTIAL HYPERTENSION: ICD-10-CM

## 2025-02-26 ENCOUNTER — OFFICE VISIT (OUTPATIENT)
Dept: SURGERY | Age: 55
End: 2025-02-26
Payer: COMMERCIAL

## 2025-02-26 VITALS
DIASTOLIC BLOOD PRESSURE: 86 MMHG | HEIGHT: 72 IN | HEART RATE: 91 BPM | SYSTOLIC BLOOD PRESSURE: 146 MMHG | WEIGHT: 276.2 LBS | BODY MASS INDEX: 37.41 KG/M2

## 2025-02-26 DIAGNOSIS — K92.1 BLOOD IN STOOL: ICD-10-CM

## 2025-02-26 DIAGNOSIS — K59.00 CONSTIPATION, UNSPECIFIED CONSTIPATION TYPE: Primary | ICD-10-CM

## 2025-02-26 PROCEDURE — 3079F DIAST BP 80-89 MM HG: CPT

## 2025-02-26 PROCEDURE — 3077F SYST BP >= 140 MM HG: CPT

## 2025-02-26 PROCEDURE — 99214 OFFICE O/P EST MOD 30 MIN: CPT

## 2025-02-26 RX ORDER — LISINOPRIL 40 MG/1
40 TABLET ORAL DAILY
Qty: 30 TABLET | Refills: 11 | Status: SHIPPED | OUTPATIENT
Start: 2025-02-26

## 2025-02-26 RX ORDER — POLYETHYLENE GLYCOL 3350 17 G/17G
238 POWDER, FOR SOLUTION ORAL ONCE
Qty: 238 G | Refills: 0 | Status: SHIPPED | OUTPATIENT
Start: 2025-02-26 | End: 2025-02-26

## 2025-02-26 NOTE — TELEPHONE ENCOUNTER
Last visit: 1/27/25  Last Med refill:   Does patient have enough medication for 72 hours: No:     Next Visit Date:  Future Appointments   Date Time Provider Department Center   2/26/2025 10:30 AM SCHEDULE, [facility]   3/5/2025  3:30 PM Sandoval Ortiz MD [facility]       Health Maintenance   Topic Date Due    Diabetic retinal exam  Never done    Colorectal Cancer Screen  Never done    Shingles vaccine (2 of 2) 02/01/2020    A1C test (Diabetic or Prediabetic)  07/16/2024    COVID-19 Vaccine (1 - 2024-25 season) Never done    Diabetic foot exam  12/01/2024    Hepatitis B vaccine (3 of 3 - 19+ 3-dose series) 03/24/2025    Diabetic Alb to Cr ratio (uACR) test  01/20/2026    Lipids  01/20/2026    GFR test (Diabetes, CKD 3-4, OR last GFR 15-59)  01/20/2026    Depression Screen  01/27/2026    Annual Wellness Visit (Medicare)  01/28/2026    DTaP/Tdap/Td vaccine (2 - Td or Tdap) 08/10/2028    Flu vaccine  Completed    Pneumococcal 50+ years Vaccine  Completed    Hepatitis C screen  Completed    HIV screen  Completed    Hepatitis A vaccine  Aged Out    Hib vaccine  Aged Out    Polio vaccine  Aged Out    Meningococcal (ACWY) vaccine  Aged Out    Pneumococcal 0-49 years Vaccine  Discontinued       Hemoglobin A1C (%)   Date Value   04/16/2024 10.9   01/29/2024 10.3   09/15/2022 12.1             ( goal A1C is < 7)   No components found for: \"LABMICR\"  No components found for: \"LDLCHOLESTEROL\", \"LDLCALC\"    (goal LDL is <100)   AST (U/L)   Date Value   01/20/2025 29     ALT (U/L)   Date Value   01/20/2025 30     BUN (mg/dL)   Date Value   01/20/2025 14     BP Readings from Last 3 Encounters:   01/27/25 127/88   06/25/24 134/81   04/16/24 126/73          (goal 120/80)    All Future Testing planned in [facility]  Lab Frequency Next Occurrence   EMG Once 03/05/2024   Hemoglobin A1C Once 06/25/2024   US EXTREMITY JOINT LEFT NON VASC COMPLETE Once 06/25/2024   Sleep Study with PAP

## 2025-02-26 NOTE — TELEPHONE ENCOUNTER
Medical chart reviewed due to medication refill request.  Renal function WNL/baseline.  Patient recently seen.  Will provide 1 year supply as requested.  Please notify writer if any questions or concerns.

## 2025-02-26 NOTE — PROGRESS NOTES
Visit Information    Have you changed or started any medications since your last visit including any over-the-counter medicines, vitamins, or herbal medicines? no   Are you having any side effects from any of your medications? -  no  Have you stopped taking any of your medications? Is so, why? -  no    Have you seen any other physician or provider since your last visit? Yes - Records Obtained  Have you had any other diagnostic tests since your last visit? Yes - Records Obtained  Have you been seen in the emergency room and/or had an admission to a hospital since we last saw you? No  Have you had your routine dental cleaning in the past 6 months? no    Have you activated your [employer]MR account? If not, what are your barriers? Yes     Patient Care Team:  Sandoval Ortiz MD as PCP - General (Family Medicine)  Sandoval Ortiz MD as PCP - Empaneled Provider    Medical History Review  Past Medical, Family, and Social History reviewed and does not contribute to the patient presenting condition    Health Maintenance   Topic Date Due    Diabetic retinal exam  Never done    Colorectal Cancer Screen  Never done    Shingles vaccine (2 of 2) 02/01/2020    A1C test (Diabetic or Prediabetic)  07/16/2024    COVID-19 Vaccine (1 - 2024-25 season) Never done    Diabetic foot exam  12/01/2024    Hepatitis B vaccine (3 of 3 - 19+ 3-dose series) 03/24/2025    Diabetic Alb to Cr ratio (uACR) test  01/20/2026    Lipids  01/20/2026    GFR test (Diabetes, CKD 3-4, OR last GFR 15-59)  01/20/2026    Depression Screen  01/27/2026    Annual Wellness Visit (Medicare)  01/28/2026    DTaP/Tdap/Td vaccine (2 - Td or Tdap) 08/10/2028    Flu vaccine  Completed    Pneumococcal 50+ years Vaccine  Completed    Hepatitis C screen  Completed    HIV screen  Completed    Hepatitis A vaccine  Aged Out    Hib vaccine  Aged Out    Polio vaccine  Aged Out    Meningococcal (ACWY) vaccine  Aged Out    Pneumococcal 0-49 years Vaccine  Discontinued       
Allergies    Family History   Problem Relation Age of Onset    Diabetes Mother     Diabetes Father     Hypertension Father     Stroke Sister     Other Sister         HIP REPLACEMENT       Social History     Socioeconomic History    Marital status:      Spouse name: Not on file    Number of children: Not on file    Years of education: Not on file    Highest education level: Not on file   Occupational History    Not on file   Tobacco Use    Smoking status: Former     Types: Cigars     Quit date: 1995     Years since quittin.7    Smokeless tobacco: Never    Tobacco comments:     2 cigars per week   Vaping Use    Vaping status: Never Used   Substance and Sexual Activity    Alcohol use: No    Drug use: No    Sexual activity: Not on file   Other Topics Concern    Not on file   Social History Narrative    Not on file     Social Determinants of Health     Financial Resource Strain: Low Risk  (2024)    Overall Financial Resource Strain (CARDIA)     Difficulty of Paying Living Expenses: Not hard at all   Food Insecurity: No Food Insecurity (2025)    Hunger Vital Sign     Worried About Running Out of Food in the Last Year: Never true     Ran Out of Food in the Last Year: Never true   Transportation Needs: No Transportation Needs (2025)    PRAPARE - Transportation     Lack of Transportation (Medical): No     Lack of Transportation (Non-Medical): No   Physical Activity: Insufficiently Active (2025)    Exercise Vital Sign     Days of Exercise per Week: 4 days     Minutes of Exercise per Session: 30 min   Stress: Not on file   Social Connections: Not on file   Intimate Partner Violence: Not on file   Housing Stability: Low Risk  (2025)    Housing Stability Vital Sign     Unable to Pay for Housing in the Last Year: No     Number of Times Moved in the Last Year: 0     Homeless in the Last Year: No       ROS:   GEN: Denies recent weight loss, fatigue, fevers, chills.  HEENT: No rhinorrhea, 
chills.  HEENT: No rhinorrhea, dysphagia, odynphagia.   CV: History of angina pectoris, most recent episode around December; no recent chest pain since.  No history of MI.  RESP: Denies shortness of breath, exertional dyspnea, COPD, asthma.  GI: Denies diarrhea, abdominal pain, black stools.  : Denies increased frequency or dysuria.  HEM:: Denies history of anemia or DVTs.  ENDO: Denies history of thyroid problems or diabetes.  NEURO: History of posterior laminectomy of C3-C7 with residual right upper extremity tingling and mild weakness.  Uses a cane to ambulate.  Denies history of CVA, TIA.     Physical Exam:  Vitals:    02/26/25 1100   BP: (!) 146/86   Pulse: 91     General: A & O x3  HEENT:  NCAT, EOMI  Heart: RR  Lungs: No respiratory distress, no accessory muscle use  Abdomen: soft, nontender, no guarding, no rebound, no masses  RECTAL: deferred       Assessment   Constipation   History of straining with blood per rectum    Plan   Patient has a history of constipation, straining, and episode of bloody stool.  Plan is to proceed with diagnostic colonoscopy pending cardiac risk stratification from Dr. Levine.  Patient is agreeable to this plan.     The risks, benefits, and alternatives were explained in detail to the patient including risks of bleeding and perforation.  All questions were thoroughly answered.  He understood what was discussed.  He then voiced and signed informed consent for the procedure. Miralax prescribed.       Lori Webb DO  2/26/2025    Attending Physician Statement  I have discussed the case with Dr Webb, including pertinent history and exam findings with the resident. I have seen and examined the patient and the key elements of the encounter have been performed by me.  I agree with the assessment, plan and orders as documented by the resident.      Electronically signed by Ricardo Rosenthal IV, DO  on 3/5/2025 at 9:58 AM

## 2025-02-26 NOTE — PATIENT INSTRUCTIONS
Thank you letting us take care of you today. We hope that all your questions were addressed. If a question was overlooked or something else comes to mind after you return home, please call our office at [phone].    If you need to cancel or change an appointment, surgery or procedure, please contact the office at [phone].

## 2025-03-24 ENCOUNTER — TELEPHONE (OUTPATIENT)
Dept: FAMILY MEDICINE CLINIC | Age: 55
End: 2025-03-24

## 2025-03-24 NOTE — TELEPHONE ENCOUNTER
Writer received paperwork from [facility]. This pharmacy is not listed on patient chart. Writer spoke to patient. Per patient yes would like to use this pharmacy as his new pharmacy due to insurance change. [facility]. Writer placed form in the Dr. Ortiz mailbox for completion. Patient thanked writer.

## 2025-03-24 NOTE — TELEPHONE ENCOUNTER
Please note paperwork reviewed from pharmacy of patient choice and medications selected for refills as per pharmacy request.  Please note we will not provide further than 30 days worth of gabapentin at this time as it is a monitored substance and for patient safety.  Other medications okay to provide year supply.  MA team to assist in contacting pharmacy for movement of patient brought future prescriptions to patient's selected pharmacy as writer has also provided your supplies of medications to [facility] recently.    MA team to notify patient of the above and let writer know if any questions or concerns.  Paperwork requested filled out and placed in inbox.

## 2025-03-25 NOTE — TELEPHONE ENCOUNTER
Patient called the office back, informed him of the previous encounter, patient voiced his understanding, patient also confirmed his appointment tomorrow.

## 2025-04-11 DIAGNOSIS — E11.49 OTHER DIABETIC NEUROLOGICAL COMPLICATION ASSOCIATED WITH TYPE 2 DIABETES MELLITUS: Chronic | ICD-10-CM

## 2025-04-11 DIAGNOSIS — E11.8 TYPE 2 DIABETES MELLITUS WITH COMPLICATION, WITH LONG-TERM CURRENT USE OF INSULIN (HCC): Chronic | ICD-10-CM

## 2025-04-11 DIAGNOSIS — G62.9 NEUROPATHY: Chronic | ICD-10-CM

## 2025-04-11 DIAGNOSIS — Z79.4 TYPE 2 DIABETES MELLITUS WITH COMPLICATION, WITH LONG-TERM CURRENT USE OF INSULIN (HCC): Chronic | ICD-10-CM

## 2025-04-11 RX ORDER — GABAPENTIN 600 MG/1
TABLET ORAL
Qty: 60 TABLET | Refills: 0 | Status: SHIPPED | OUTPATIENT
Start: 2025-04-11 | End: 2025-05-11

## 2025-04-11 NOTE — TELEPHONE ENCOUNTER
Patient medical chart review due to refill request for gabapentin.  Patient PDMP reviewed showing patient received a 30-day supply on 1/17/2025 and on 2/11/2025.  Patient then technically should have been out of the gabapentin approximately 3/11/2025 and is requesting this on 4/11/2025.  Of note, patient was to receive a 90-day supply of gabapentin on 1/27/2025 however this was changed to 30-day supply on further review due to again patient having multiple episodes of infrequent use of the gabapentin and discussion of possible alternative medication.  Patient is scheduled with writer on 4/23/2025 and will discuss this further at that time.  In the interim we will provide patient with another 30-day refill the gabapentin.      MA team to notify patient of the above, that we will need to discuss possible alternatives as well as the medication further on his upcoming visit, and MA team to let writer know if any questions or concerns.  
Study with PAP Titration Once 08/12/2024   PSA Screening Once 01/27/2025               Patient Active Problem List:     Uncontrolled type 1 diabetes mellitus with hyperglycemia (HCC)     Chest pain, atypical     Need for prophylactic vaccination and inoculation against varicella     Allergic rhinitis     Pain in right toe(s)     Foot infection     Chest pain on breathing     Diabetic polyneuropathy associated with type 1 diabetes mellitus     Hyperlipidemia     Class 2 severe obesity due to excess calories with serious comorbidity and body mass index (BMI) of 36.0 to 36.9 in adult     Former smoker, stopped smoking in distant past     Peripheral vascular disorder     Resistant hypertension     Body mass index [BMI] 37.0-37.9, adult (Z68.37)

## 2025-04-23 ENCOUNTER — OFFICE VISIT (OUTPATIENT)
Dept: FAMILY MEDICINE CLINIC | Age: 55
End: 2025-04-23
Payer: MEDICARE

## 2025-04-23 ENCOUNTER — RESULTS FOLLOW-UP (OUTPATIENT)
Dept: FAMILY MEDICINE CLINIC | Age: 55
End: 2025-04-23

## 2025-04-23 VITALS
WEIGHT: 270.8 LBS | DIASTOLIC BLOOD PRESSURE: 85 MMHG | HEART RATE: 89 BPM | HEIGHT: 72 IN | BODY MASS INDEX: 36.68 KG/M2 | SYSTOLIC BLOOD PRESSURE: 137 MMHG

## 2025-04-23 DIAGNOSIS — Z01.818 PRE-OP EVALUATION: ICD-10-CM

## 2025-04-23 DIAGNOSIS — E10.65 TYPE 1 DIABETES MELLITUS WITH HYPERGLYCEMIA (HCC): ICD-10-CM

## 2025-04-23 DIAGNOSIS — R07.2 PRECORDIAL PAIN: Primary | ICD-10-CM

## 2025-04-23 DIAGNOSIS — E11.49 OTHER DIABETIC NEUROLOGICAL COMPLICATION ASSOCIATED WITH TYPE 2 DIABETES MELLITUS (HCC): Chronic | ICD-10-CM

## 2025-04-23 DIAGNOSIS — G62.9 NEUROPATHY: Chronic | ICD-10-CM

## 2025-04-23 DIAGNOSIS — R10.13 DYSPEPSIA: Chronic | ICD-10-CM

## 2025-04-23 PROCEDURE — G8427 DOCREV CUR MEDS BY ELIG CLIN: HCPCS | Performed by: FAMILY MEDICINE

## 2025-04-23 PROCEDURE — 1036F TOBACCO NON-USER: CPT | Performed by: FAMILY MEDICINE

## 2025-04-23 PROCEDURE — 99214 OFFICE O/P EST MOD 30 MIN: CPT | Performed by: FAMILY MEDICINE

## 2025-04-23 PROCEDURE — G8417 CALC BMI ABV UP PARAM F/U: HCPCS | Performed by: FAMILY MEDICINE

## 2025-04-23 PROCEDURE — 3075F SYST BP GE 130 - 139MM HG: CPT | Performed by: FAMILY MEDICINE

## 2025-04-23 PROCEDURE — 3079F DIAST BP 80-89 MM HG: CPT | Performed by: FAMILY MEDICINE

## 2025-04-23 PROCEDURE — 2022F DILAT RTA XM EVC RTNOPTHY: CPT | Performed by: FAMILY MEDICINE

## 2025-04-23 PROCEDURE — 93010 ELECTROCARDIOGRAM REPORT: CPT | Performed by: FAMILY MEDICINE

## 2025-04-23 PROCEDURE — 93005 ELECTROCARDIOGRAM TRACING: CPT | Performed by: FAMILY MEDICINE

## 2025-04-23 PROCEDURE — 3046F HEMOGLOBIN A1C LEVEL >9.0%: CPT | Performed by: FAMILY MEDICINE

## 2025-04-23 PROCEDURE — 3017F COLORECTAL CA SCREEN DOC REV: CPT | Performed by: FAMILY MEDICINE

## 2025-04-23 RX ORDER — FAMOTIDINE 40 MG/1
40 TABLET, FILM COATED ORAL 2 TIMES DAILY
Qty: 60 TABLET | Refills: 3 | Status: SHIPPED | OUTPATIENT
Start: 2025-04-23 | End: 2025-08-21

## 2025-04-23 RX ORDER — GABAPENTIN 600 MG/1
600 TABLET ORAL DAILY
Qty: 30 TABLET | Refills: 0
Start: 2025-04-23 | End: 2025-05-23

## 2025-04-23 RX ORDER — METOPROLOL SUCCINATE 25 MG/1
25 TABLET, EXTENDED RELEASE ORAL DAILY
Qty: 30 TABLET | Refills: 5 | Status: SHIPPED | OUTPATIENT
Start: 2025-04-23

## 2025-04-23 ASSESSMENT — PATIENT HEALTH QUESTIONNAIRE - PHQ9
SUM OF ALL RESPONSES TO PHQ QUESTIONS 1-9: 0
DEPRESSION UNABLE TO ASSESS: PT REFUSES
2. FEELING DOWN, DEPRESSED OR HOPELESS: NOT AT ALL
SUM OF ALL RESPONSES TO PHQ QUESTIONS 1-9: 0
1. LITTLE INTEREST OR PLEASURE IN DOING THINGS: NOT AT ALL
SUM OF ALL RESPONSES TO PHQ QUESTIONS 1-9: 0
SUM OF ALL RESPONSES TO PHQ QUESTIONS 1-9: 0

## 2025-04-23 ASSESSMENT — ENCOUNTER SYMPTOMS
SPUTUM PRODUCTION: 0
NAUSEA: 0
CHOKING: 0
ORTHOPNEA: 0
HEMOPTYSIS: 0
SHORTNESS OF BREATH: 1
ABDOMINAL PAIN: 0
VOMITING: 0
WHEEZING: 0
APNEA: 0
CHEST TIGHTNESS: 0
STRIDOR: 0
BACK PAIN: 0
COUGH: 0

## 2025-04-23 NOTE — PATIENT INSTRUCTIONS
Thank you for letting us take care of you today. We hope all your questions were addressed. If a question was overlooked or something else comes to mind after you return home, please contact a member of your Care Team listed below.      Your Care Team at [facility] is Team #  Sandoval Ortiz M.D. (Faculty)  Uma Nguyen M.D. (Resident)  Berlin English M.D. (Resident)   Ashley Porter M.D. (Resident)  Abdirashid Vigil M.D. (Resident)  Ashlyn Campo M.D. (Resident)  Roshni Joiner., [facility]  Alyce Holloway, [facility]  Melinda Coker, [facility]  Hemanth Walker, [facility]  Rose Jacinto, [facility]  Ariana Wiley, [facility]  Dexter Delgado (LJ) CRISTIANE Valadez (Clinical Practice Manager)  Sravanthi Lopes [facility] (Clinical Pharmacist)     Office phone number: [phone]    If you need to get in right away due to illness, please be advised we have \"Same Day\" appointments available Monday-Friday. Please call us at [phone] option #3 to schedule your \"Same Day\" appointment.

## 2025-04-23 NOTE — PROGRESS NOTES
Visit Information    Have you changed or started any medications since your last visit including any over-the-counter medicines, vitamins, or herbal medicines? no   Are you having any side effects from any of your medications? -  no  Have you stopped taking any of your medications? Is so, why? -  no    Have you seen any other physician or provider since your last visit? No  Have you had any other diagnostic tests since your last visit? No  Have you been seen in the emergency room and/or had an admission to a hospital since we last saw you? No  Have you had your routine dental cleaning in the past 6 months? no    Have you activated your [employer] account? If not, what are your barriers? Yes     Patient Care Team:  Sandoval Ortiz MD as PCP - General (Family Medicine)  [facility]    Medical History Review  Past Medical, Family, and Social History reviewed and does not contribute to the patient presenting condition    Health Maintenance   Topic Date Due    Diabetic retinal exam  Never done    Colorectal Cancer Screen  Never done    Shingles vaccine (2 of 2) 02/01/2020    COVID-19 Vaccine (1 - 2024-25 season) Never done    Diabetic foot exam  12/01/2024    Hepatitis B vaccine (3 of 3 - 19+ 3-dose series) 03/24/2025    A1C test (Diabetic or Prediabetic)  04/16/2025    Diabetic Alb to Cr ratio (uACR) test  01/20/2026    Lipids  01/20/2026    GFR test (Diabetes, CKD 3-4, OR last GFR 15-59)  01/20/2026    Depression Screen  01/27/2026    Annual Wellness Visit (Medicare)  01/28/2026    DTaP/Tdap/Td vaccine (2 - Td or Tdap) 08/10/2028    Flu vaccine  Completed    Pneumococcal 50+ years Vaccine  Completed    Hepatitis C screen  Completed    HIV screen  Completed    Hepatitis A vaccine  Aged Out    Hib vaccine  Aged Out    Polio vaccine  Aged Out    Meningococcal (ACWY) vaccine  Aged Out    Meningococcal B vaccine  Aged Out    Pneumococcal 0-49 years Vaccine  Discontinued       
Pulmonary effort is normal. No respiratory distress.      Breath sounds: Normal breath sounds. No stridor. No wheezing, rhonchi or rales.   Chest:      Chest wall: No tenderness.   Abdominal:      Palpations: Abdomen is soft.   Musculoskeletal:      Right lower leg: Edema (bilateral pitting edema moderate severity) present.      Left lower leg: Edema present.   Skin:     General: Skin is warm.      Capillary Refill: Capillary refill takes less than 2 seconds.   Neurological:      Mental Status: He is alert and oriented to person, place, and time.      Gait: Gait normal.   Psychiatric:         Mood and Affect: Mood normal.         Behavior: Behavior normal.         Thought Content: Thought content normal.         Judgment: Judgment normal.       EKG:  NSR at rate 81 with LBBB also seen on previous EKG compared to on 11/11/2021.  Axis is upright with possible depression in V5 but no contiguous leads, reciprocal changes and does not meet Sgarbossa criteria.  Lead 3 in previous EKG more negative than today's EKG.  No other significant changes on comparison.        Assessment/Plan:    Angina  - Patient reported episode 2 weeks ago while carrying laundry upstairs  - Episode lasted 1-2 minutes with swelling, irregular heartbeat, palpitations, and shortness of breath  - Resolved with rest, followed by fatigue for several hours  - Stress test in February deemed low risk but non-diagnostic due to conduction delay/LBBB  - Today's EKG shows left bundle branch block, first noticed on November 11, 2021  - Some EKG changes noted, including more isoelectric lead 3 and possible ST depression in V5  - Patient in high intermediate risk category for cardiovascular events, patient refused referral to the ER stating understanding of the risks of not going to the ER including risks of delay in diagnosis, permanent disability or worse   - BP well controlled today  Plan:  - Increase anti-anginal medications  - Upgraded cardiology referral

## 2025-06-03 DIAGNOSIS — E10.65 TYPE 1 DIABETES MELLITUS WITH HYPERGLYCEMIA (HCC): ICD-10-CM

## 2025-06-03 DIAGNOSIS — E11.49 OTHER DIABETIC NEUROLOGICAL COMPLICATION ASSOCIATED WITH TYPE 2 DIABETES MELLITUS (HCC): Chronic | ICD-10-CM

## 2025-06-03 DIAGNOSIS — G62.9 NEUROPATHY: Chronic | ICD-10-CM

## 2025-06-03 RX ORDER — GABAPENTIN 600 MG/1
600 TABLET ORAL DAILY
Qty: 30 TABLET | Refills: 0 | Status: SHIPPED | OUTPATIENT
Start: 2025-06-05 | End: 2025-07-05

## 2025-06-03 NOTE — TELEPHONE ENCOUNTER
Patient states pharmacy sent over a refillLast visit: 04/23/2025  Last Med refill: 04/24/2025  Does patient have enough medication for 72 hours: No    Next Visit Date:  Future Appointments   Date Time Provider Department Center   7/2/2025  4:00 PM Sandoval Ortiz MD [facility]       Health Maintenance   Topic Date Due    Diabetic retinal exam  Never done    Colorectal Cancer Screen  Never done    Shingles vaccine (2 of 2) 02/01/2020    COVID-19 Vaccine (1 - 2024-25 season) Never done    Diabetic foot exam  12/01/2024    Hepatitis B vaccine (3 of 3 - 19+ 3-dose series) 03/24/2025    A1C test (Diabetic or Prediabetic)  04/16/2025    Diabetic Alb to Cr ratio (uACR) test  01/20/2026    Lipids  01/20/2026    GFR test (Diabetes, CKD 3-4, OR last GFR 15-59)  01/20/2026    Depression Screen  04/23/2026    DTaP/Tdap/Td vaccine (2 - Td or Tdap) 08/10/2028    Annual Wellness Visit (Medicare Advantage)  Completed    Flu vaccine  Completed    Pneumococcal 50+ years Vaccine  Completed    Hepatitis C screen  Completed    HIV screen  Completed    Hepatitis A vaccine  Aged Out    Hib vaccine  Aged Out    Polio vaccine  Aged Out    Meningococcal (ACWY) vaccine  Aged Out    Meningococcal B vaccine  Aged Out    Pneumococcal 0-49 years Vaccine  Discontinued       Hemoglobin A1C (%)   Date Value   04/16/2024 10.9   01/29/2024 10.3   09/15/2022 12.1             ( goal A1C is < 7)   No components found for: \"LABMICR\"  No components found for: \"LDLCHOLESTEROL\", \"LDLCALC\"    (goal LDL is <100)   AST (U/L)   Date Value   01/20/2025 29     ALT (U/L)   Date Value   01/20/2025 30     BUN (mg/dL)   Date Value   01/20/2025 14     BP Readings from Last 3 Encounters:   04/30/25 136/82   04/23/25 137/85   02/26/25 (!) 146/86          (goal 120/80)    All Future Testing planned in [facility]  Lab Frequency Next Occurrence   Hemoglobin A1C Once 06/25/2024   US EXTREMITY JOINT LEFT NON VASC COMPLETE Once 06/25/2024   Sleep Study with PAP

## 2025-06-03 NOTE — TELEPHONE ENCOUNTER
Medical chart review due to medication refill request for gabapentin.  Patient does have follow-up visit with writer on 7/2/2025.  PDMP/database reviewed and patient changed at last visit to once daily dosing with patient given 60 pills on 4/9/2025 (last day 6/8/2025.  Medication usage consistent with refill history and appropriate at this time.  As such, patient will be provided with requested refill however we will provide now 30-day supply at this time with start date for 6/5/2025 (Thursday).  We will also continue it once daily with further review at a future visit of effectiveness of this.      MA team to notify patient of the above and let writer know if any questions or concerns.

## 2025-06-13 DIAGNOSIS — E11.49 OTHER DIABETIC NEUROLOGICAL COMPLICATION ASSOCIATED WITH TYPE 2 DIABETES MELLITUS (HCC): Chronic | ICD-10-CM

## 2025-06-13 DIAGNOSIS — G62.9 NEUROPATHY: Chronic | ICD-10-CM

## 2025-06-13 DIAGNOSIS — E10.65 TYPE 1 DIABETES MELLITUS WITH HYPERGLYCEMIA (HCC): ICD-10-CM

## 2025-06-13 RX ORDER — GABAPENTIN 600 MG/1
TABLET ORAL
Qty: 30 TABLET | Refills: 11 | OUTPATIENT
Start: 2025-06-13

## 2025-07-07 DIAGNOSIS — G62.9 NEUROPATHY: Chronic | ICD-10-CM

## 2025-07-07 DIAGNOSIS — E11.49 OTHER DIABETIC NEUROLOGICAL COMPLICATION ASSOCIATED WITH TYPE 2 DIABETES MELLITUS (HCC): Chronic | ICD-10-CM

## 2025-07-07 DIAGNOSIS — E10.65 TYPE 1 DIABETES MELLITUS WITH HYPERGLYCEMIA (HCC): ICD-10-CM

## 2025-07-07 RX ORDER — GABAPENTIN 600 MG/1
TABLET ORAL
Qty: 30 TABLET | Refills: 0 | OUTPATIENT
Start: 2025-07-07

## 2025-08-01 ENCOUNTER — OFFICE VISIT (OUTPATIENT)
Age: 55
End: 2025-08-01
Payer: MEDICARE

## 2025-08-01 VITALS
HEART RATE: 91 BPM | BODY MASS INDEX: 33.73 KG/M2 | DIASTOLIC BLOOD PRESSURE: 71 MMHG | WEIGHT: 262.8 LBS | HEIGHT: 74 IN | SYSTOLIC BLOOD PRESSURE: 117 MMHG | OXYGEN SATURATION: 99 %

## 2025-08-01 DIAGNOSIS — I1A.0 RESISTANT HYPERTENSION: ICD-10-CM

## 2025-08-01 DIAGNOSIS — E10.65 TYPE 1 DIABETES MELLITUS WITH HYPERGLYCEMIA (HCC): Primary | ICD-10-CM

## 2025-08-01 LAB — HBA1C MFR BLD: 8.9 %

## 2025-08-01 PROCEDURE — 3052F HG A1C>EQUAL 8.0%<EQUAL 9.0%: CPT

## 2025-08-01 PROCEDURE — 3017F COLORECTAL CA SCREEN DOC REV: CPT

## 2025-08-01 PROCEDURE — 3078F DIAST BP <80 MM HG: CPT

## 2025-08-01 PROCEDURE — 3074F SYST BP LT 130 MM HG: CPT

## 2025-08-01 PROCEDURE — 83036 HEMOGLOBIN GLYCOSYLATED A1C: CPT

## 2025-08-01 PROCEDURE — G8417 CALC BMI ABV UP PARAM F/U: HCPCS

## 2025-08-01 PROCEDURE — 1036F TOBACCO NON-USER: CPT

## 2025-08-01 PROCEDURE — G8427 DOCREV CUR MEDS BY ELIG CLIN: HCPCS

## 2025-08-01 PROCEDURE — 2022F DILAT RTA XM EVC RTNOPTHY: CPT

## 2025-08-01 PROCEDURE — 99213 OFFICE O/P EST LOW 20 MIN: CPT

## 2025-08-14 DIAGNOSIS — R10.13 DYSPEPSIA: Chronic | ICD-10-CM

## 2025-08-14 RX ORDER — FAMOTIDINE 40 MG/1
40 TABLET, FILM COATED ORAL 2 TIMES DAILY
Qty: 60 TABLET | Refills: 3 | Status: SHIPPED | OUTPATIENT
Start: 2025-08-14 | End: 2025-12-12

## 2025-09-04 DIAGNOSIS — R10.13 DYSPEPSIA: Chronic | ICD-10-CM

## 2025-09-05 RX ORDER — FAMOTIDINE 40 MG/1
TABLET, FILM COATED ORAL
Qty: 60 TABLET | Refills: 3 | Status: SHIPPED | OUTPATIENT
Start: 2025-09-05

## (undated) DEVICE — ELECTRODE ES L3IN S STL BLDE INSUL DISP VALLEYLAB EDGE

## (undated) DEVICE — DRAPE ADOLESCENT  LAPAROTOMY

## (undated) DEVICE — DRAPE THYROID

## (undated) DEVICE — 3.0MM PRECISION NEURO (MATCH HEAD)

## (undated) DEVICE — 3M™ IOBAN™ 2 ANTIMICROBIAL INCISE DRAPE 6650EZ: Brand: IOBAN™ 2

## (undated) DEVICE — SUTURE VCRL SZ 0 L18IN ABSRB UD L36MM CT-1 1/2 CIR J840D

## (undated) DEVICE — ELECTRODE PT RET AD L9FT HI MOIST COND ADH HYDRGEL CORDED

## (undated) DEVICE — 1010 S-DRAPE TOWEL DRAPE 10/BX: Brand: STERI-DRAPE™

## (undated) DEVICE — SUTURE VCRL SZ 3-0 L18IN ABSRB VLT L26MM SH 1/2 CIR J774D

## (undated) DEVICE — CORD,CAUTERY,BIPOLAR,STERILE: Brand: MEDLINE

## (undated) DEVICE — CODMAN® SURGICAL PATTIES 1/2" X 1/2" (1.27CM X 1.27CM): Brand: CODMAN®

## (undated) DEVICE — DRAPE,REIN 53X77,STERILE: Brand: MEDLINE

## (undated) DEVICE — CODMAN® SURGICAL PATTIES 1/2" X1 1/2" (1.27CM X 3.81CM): Brand: CODMAN®

## (undated) DEVICE — DISPOSABLE IRRIGATION BIPOLAR CORD, M1000 TYPE: Brand: KIRWAN

## (undated) DEVICE — SUTURE VCRL SZ 2-0 L27IN ABSRB UD L36MM CT-1 1/2 CIR JJ42G

## (undated) DEVICE — DRAPE C ARM UNIV W41XL74IN CLR PLAS XR VELC CLSR POLY STRP

## (undated) DEVICE — KIT EVAC 0.13IN RECT TB DIA10FR 400CC PVC 3 SPR Y CONN DRN

## (undated) DEVICE — 3M™ PRECISE™ VISTA DISPOSABLE SKIN STAPLER 3995: Brand: 3M™ PRECISE™

## (undated) DEVICE — PROTECTOR ULN NRV PUR FOAM HK LOOP STRP ANATOMICALLY

## (undated) DEVICE — PACK PROCEDURE SURG LUMBAR SPINE SVMMC

## (undated) DEVICE — CONTROL SYRINGE LUER-LOCK TIP: Brand: MONOJECT